# Patient Record
Sex: FEMALE | Race: OTHER | Employment: OTHER | URBAN - METROPOLITAN AREA
[De-identification: names, ages, dates, MRNs, and addresses within clinical notes are randomized per-mention and may not be internally consistent; named-entity substitution may affect disease eponyms.]

---

## 2018-02-21 ENCOUNTER — OFFICE VISIT (OUTPATIENT)
Dept: ENDOCRINOLOGY | Age: 38
End: 2018-02-21
Payer: COMMERCIAL

## 2018-02-21 VITALS
DIASTOLIC BLOOD PRESSURE: 88 MMHG | BODY MASS INDEX: 40.98 KG/M2 | WEIGHT: 255 LBS | HEIGHT: 66 IN | HEART RATE: 80 BPM | SYSTOLIC BLOOD PRESSURE: 138 MMHG

## 2018-02-21 DIAGNOSIS — E66.01 MORBID OBESITY (HCC): ICD-10-CM

## 2018-02-21 DIAGNOSIS — E03.9 HYPOTHYROIDISM, UNSPECIFIED TYPE: ICD-10-CM

## 2018-02-21 DIAGNOSIS — E03.9 HYPOTHYROIDISM, UNSPECIFIED TYPE: Primary | ICD-10-CM

## 2018-02-21 DIAGNOSIS — G47.33 OBSTRUCTIVE SLEEP APNEA SYNDROME: ICD-10-CM

## 2018-02-21 DIAGNOSIS — G47.33 OSA (OBSTRUCTIVE SLEEP APNEA): ICD-10-CM

## 2018-02-21 PROBLEM — F43.10 PTSD (POST-TRAUMATIC STRESS DISORDER): Status: ACTIVE | Noted: 2018-02-21

## 2018-02-21 PROBLEM — E66.9 OBESITY: Status: ACTIVE | Noted: 2018-02-21

## 2018-02-21 LAB
ANION GAP SERPL CALCULATED.3IONS-SCNC: 16 MEQ/L (ref 7–13)
BUN BLDV-MCNC: 16 MG/DL (ref 6–20)
CALCIUM SERPL-MCNC: 8.7 MG/DL (ref 8.6–10.2)
CHLORIDE BLD-SCNC: 102 MEQ/L (ref 98–107)
CO2: 23 MEQ/L (ref 22–29)
CREAT SERPL-MCNC: 0.54 MG/DL (ref 0.5–0.9)
GFR AFRICAN AMERICAN: >60
GFR NON-AFRICAN AMERICAN: >60
GLUCOSE BLD-MCNC: 74 MG/DL (ref 74–109)
POTASSIUM SERPL-SCNC: 4.1 MEQ/L (ref 3.5–5.1)
SODIUM BLD-SCNC: 141 MEQ/L (ref 132–144)
T4 FREE: 1.2 NG/DL (ref 0.93–1.7)
TSH SERPL DL<=0.05 MIU/L-ACNC: 1.89 UIU/ML (ref 0.27–4.2)

## 2018-02-21 PROCEDURE — 99203 OFFICE O/P NEW LOW 30 MIN: CPT | Performed by: INTERNAL MEDICINE

## 2018-02-21 RX ORDER — LEVOTHYROXINE SODIUM 0.12 MG/1
125 TABLET ORAL DAILY
COMMUNITY
End: 2022-08-19 | Stop reason: ALTCHOICE

## 2018-02-21 RX ORDER — LORATADINE 10 MG/1
10 CAPSULE, LIQUID FILLED ORAL DAILY
COMMUNITY
End: 2022-08-19 | Stop reason: ALTCHOICE

## 2018-02-21 RX ORDER — TRIAMCINOLONE ACETONIDE 1 MG/G
CREAM TOPICAL 2 TIMES DAILY
COMMUNITY
End: 2022-08-19 | Stop reason: ALTCHOICE

## 2018-02-21 RX ORDER — POLYETHYLENE GLYCOL 3350 17 G/17G
17 POWDER, FOR SOLUTION ORAL DAILY
COMMUNITY
End: 2022-08-19 | Stop reason: ALTCHOICE

## 2018-02-21 RX ORDER — OXYBUTYNIN CHLORIDE 5 MG/1
5 TABLET ORAL DAILY
COMMUNITY
End: 2022-08-19 | Stop reason: ALTCHOICE

## 2018-02-21 RX ORDER — OMEPRAZOLE 20 MG/1
20 CAPSULE, DELAYED RELEASE ORAL DAILY
COMMUNITY
End: 2022-08-19 | Stop reason: ALTCHOICE

## 2018-02-21 RX ORDER — GABAPENTIN 100 MG/1
100 CAPSULE ORAL 3 TIMES DAILY
COMMUNITY
End: 2022-08-19 | Stop reason: ALTCHOICE

## 2018-02-21 RX ORDER — FLUTICASONE PROPIONATE 50 MCG
2 SPRAY, SUSPENSION (ML) NASAL DAILY
COMMUNITY
End: 2022-08-19 | Stop reason: ALTCHOICE

## 2018-02-21 RX ORDER — OCTISALATE, AVOBENZONE, HOMOSALATE, AND OCTOCRYLENE 29.4; 29.4; 49; 25.48 MG/ML; MG/ML; MG/ML; MG/ML
1 LOTION TOPICAL DAILY
COMMUNITY
End: 2022-08-19 | Stop reason: ALTCHOICE

## 2018-02-21 RX ORDER — TIZANIDINE 4 MG/1
4 TABLET ORAL NIGHTLY PRN
COMMUNITY
End: 2022-08-19 | Stop reason: ALTCHOICE

## 2018-02-21 RX ORDER — DEXTRAN 70, GLYCERIN, HYPROMELLOSE 1; 2; 3 MG/ML; MG/ML; MG/ML
1 SOLUTION/ DROPS OPHTHALMIC DAILY
COMMUNITY
End: 2022-08-19 | Stop reason: ALTCHOICE

## 2018-02-21 NOTE — PROGRESS NOTES
Subjective:      Patient ID: Jadon Torres is a 42 y.o. female. Other   This is a new (Hypothyroidism) problem. The current episode started more than 1 year ago. The problem has been waxing and waning. Associated symptoms include fatigue. Pertinent negatives include no myalgias or neck pain. Associated symptoms comments: Patient has been on different dosages of Synthroid over the last many years ranging between 125-150 current issues on 125 µg daily last TSH was 2.180  Patient also complains of fatigue weight gain BMI is at 41  . Treatments tried: Synthroid. Morbid obesity weight gain BMI is at 41    Patient also complains of snoring at night being sleepy in the daytime and having moderate weight gain over the last couple of years      Patient Active Problem List   Diagnosis    PTSD (post-traumatic stress disorder)    NANDO (obstructive sleep apnea)    Morbid obesity ([facility] [address]    Hypothyroidism       Social History     Social History    Marital status: Single     Spouse name: N/A    Number of children: N/A    Years of education: N/A     Occupational History    Not on file. Social History Main Topics    Smoking status: Never Smoker    Smokeless tobacco: Never Used    Alcohol use Not on file    Drug use: Unknown    Sexual activity: Not on file     Other Topics Concern    Not on file     Social History Narrative    No narrative on file     Past Surgical History:   Procedure Laterality Date    HYSTERECTOMY      JOINT REPLACEMENT Left      History reviewed. No pertinent family history.     Allergies   Allergen Reactions    Red Dye Hives         Current Outpatient Prescriptions:     albuterol (PROVENTIL) (5 MG/ML) 0.5% nebulizer solution, Take 2.5 mg by nebulization every 6 hours as needed for Wheezing, Disp: , Rfl:     loratadine (CLARITIN) 10 MG capsule, Take 10 mg by mouth daily, Disp: , Rfl:     polyethylene glycol (MIRALAX) packet, Take 17 g by mouth daily, Disp: , Rfl:     omeprazole Results for Monica Zamarripa (MRN [id number]) as of 2/26/2018 07:00   Ref. Range 2/21/2018 12:37   Sodium Latest Ref Range: 132 - 144 mEq/L 141   Potassium Latest Ref Range: 3.5 - 5.1 mEq/L 4.1   Chloride Latest Ref Range: 98 - 107 mEq/L 102   CO2 Latest Ref Range: 22 - 29 mEq/L 23   BUN Latest Ref Range: 6 - 20 mg/dL 16   Creatinine Latest Ref Range: 0.50 - 0.90 mg/dL 0.54   Anion Gap Latest Ref Range: 7 - 13 mEq/L 16 (H)   GFR Non- Latest Ref Range: >60  >60.0   GFR African American Latest Ref Range: >60  >60.0   Glucose Latest Ref Range: 74 - 109 mg/dL 74   Calcium Latest Ref Range: 8.6 - 10.2 mg/dL 8.7   TSH Latest Ref Range: 0.270 - 4.200 uIU/mL 1.890   THYROID PEROXIDASE (TPO) ABS Latest Ref Range: 0.0 - 9.0 IU/mL 1.7   T4 Free Latest Ref Range: 0.93 - 1.70 ng/dL 1.20       1/15/18  Labs reviewed from lab [facility]. glucose 100 BUN was 15 creatinine 0.79 TSH was 2.180 AST was 38 ALT was 68   Assessment:      1. Hypothyroidism, unspecified type  T4, Free    TSH without Reflex    Thyroid Peroxidase Antibody    Basic Metabolic Panel   2. Obstructive sleep apnea syndrome  Ambulatory referral to Pulmonology   3.  Morbid obesity ([address]             Plan:      Orders Placed This Encounter   Procedures    T4, Free     Standing Status:   Future     Number of Occurrences:   1     Standing Expiration Date:   2/21/2019    TSH without Reflex     Standing Status:   Future     Number of Occurrences:   1     Standing Expiration Date:   2/21/2019    Thyroid Peroxidase Antibody     Standing Status:   Future     Number of Occurrences:   1     Standing Expiration Date:   2/21/2019    Basic Metabolic Panel     Standing Status:   Future     Number of Occurrences:   1     Standing Expiration Date:   2/21/2019    Ambulatory referral to Pulmonology     Referral Priority:   Routine     Referral Type:   Consult for Advice and Opinion     Referral Reason:   Specialty Services Required     Referred to Provider:   Anselmo Serrano Beronica Shahid MD     Requested Specialty:   Pulmonology     Number of Visits Requested:   1     Continue current dose of Synthroid 125 µg daily daily labs reviewed  Refer patient to pulmonary for sleep apnea screening  More than 50% of 30 minutes spent in patient education counseling review of lab data coordination of care

## 2018-02-23 LAB — THYROID PEROXIDASE (TPO) ABS: 1.7 IU/ML (ref 0–9)

## 2018-03-16 ENCOUNTER — HOSPITAL ENCOUNTER (OUTPATIENT)
Dept: WOMENS IMAGING | Age: 38
Discharge: HOME | End: 2018-03-18
Payer: COMMERCIAL

## 2018-03-16 ENCOUNTER — HOSPITAL ENCOUNTER (OUTPATIENT)
Dept: ULTRASOUND IMAGING | Age: 38
Discharge: HOME | End: 2018-03-18
Payer: COMMERCIAL

## 2018-03-16 DIAGNOSIS — N64.4 PAIN OF BOTH BREASTS: ICD-10-CM

## 2018-03-16 DIAGNOSIS — N64.4 BREAST PAIN: ICD-10-CM

## 2018-03-16 DIAGNOSIS — N64.52 BILATERAL NIPPLE DISCHARGE: ICD-10-CM

## 2018-03-16 PROCEDURE — 76642 ULTRASOUND BREAST LIMITED: CPT

## 2018-03-16 PROCEDURE — 77066 DX MAMMO INCL CAD BI: CPT

## 2018-03-23 ENCOUNTER — HOSPITAL ENCOUNTER (OUTPATIENT)
Dept: ULTRASOUND IMAGING | Age: 38
Discharge: HOME | End: 2018-03-25
Payer: COMMERCIAL

## 2018-03-23 DIAGNOSIS — R10.2 PELVIC PAIN IN FEMALE: ICD-10-CM

## 2018-03-23 PROCEDURE — 76856 US EXAM PELVIC COMPLETE: CPT

## 2018-03-23 PROCEDURE — 76830 TRANSVAGINAL US NON-OB: CPT

## 2018-04-11 ENCOUNTER — OFFICE VISIT (OUTPATIENT)
Dept: ENDOCRINOLOGY | Age: 38
End: 2018-04-11
Payer: COMMERCIAL

## 2018-04-11 VITALS
HEART RATE: 68 BPM | DIASTOLIC BLOOD PRESSURE: 86 MMHG | SYSTOLIC BLOOD PRESSURE: 136 MMHG | WEIGHT: 255 LBS | HEIGHT: 66 IN | BODY MASS INDEX: 40.98 KG/M2

## 2018-04-11 DIAGNOSIS — E66.01 MORBID OBESITY (HCC): ICD-10-CM

## 2018-04-11 DIAGNOSIS — E03.9 HYPOTHYROIDISM, UNSPECIFIED TYPE: Primary | ICD-10-CM

## 2018-04-11 PROCEDURE — 99213 OFFICE O/P EST LOW 20 MIN: CPT | Performed by: INTERNAL MEDICINE

## 2018-04-11 PROCEDURE — 1036F TOBACCO NON-USER: CPT | Performed by: INTERNAL MEDICINE

## 2018-04-11 PROCEDURE — G8417 CALC BMI ABV UP PARAM F/U: HCPCS | Performed by: INTERNAL MEDICINE

## 2018-04-11 PROCEDURE — G8427 DOCREV CUR MEDS BY ELIG CLIN: HCPCS | Performed by: INTERNAL MEDICINE

## 2018-10-16 ENCOUNTER — HOSPITAL ENCOUNTER (OUTPATIENT)
Dept: ULTRASOUND IMAGING | Age: 38
End: 2018-10-16
Payer: COMMERCIAL

## 2018-10-16 ENCOUNTER — HOSPITAL ENCOUNTER (OUTPATIENT)
Dept: WOMENS IMAGING | Age: 38
End: 2018-10-16
Payer: COMMERCIAL

## 2018-10-16 ENCOUNTER — HOSPITAL ENCOUNTER (OUTPATIENT)
Dept: ULTRASOUND IMAGING | Age: 38
Discharge: HOME | End: 2018-10-18
Payer: COMMERCIAL

## 2018-10-16 DIAGNOSIS — D27.1 DERMOID CYST OF OVARY, LEFT: ICD-10-CM

## 2018-10-16 PROCEDURE — 76856 US EXAM PELVIC COMPLETE: CPT

## 2018-10-16 PROCEDURE — 76830 TRANSVAGINAL US NON-OB: CPT

## 2018-10-19 ENCOUNTER — HOSPITAL ENCOUNTER (OUTPATIENT)
Dept: WOMENS IMAGING | Age: 38
Discharge: HOME | End: 2018-10-21
Payer: COMMERCIAL

## 2018-10-19 ENCOUNTER — HOSPITAL ENCOUNTER (OUTPATIENT)
Dept: ULTRASOUND IMAGING | Age: 38
Discharge: HOME | End: 2018-10-21
Payer: COMMERCIAL

## 2018-10-19 DIAGNOSIS — R92.8 ABNORMAL MAMMOGRAM: ICD-10-CM

## 2018-10-19 PROCEDURE — G0279 TOMOSYNTHESIS, MAMMO: HCPCS

## 2018-10-19 PROCEDURE — 76642 ULTRASOUND BREAST LIMITED: CPT

## 2019-12-18 ENCOUNTER — HOSPITAL ENCOUNTER (OUTPATIENT)
Dept: WOMENS IMAGING | Age: 39
Discharge: HOME | End: 2019-12-20
Payer: COMMERCIAL

## 2019-12-18 ENCOUNTER — HOSPITAL ENCOUNTER (OUTPATIENT)
Dept: GENERAL RADIOLOGY | Age: 39
Discharge: HOME | End: 2019-12-20
Payer: COMMERCIAL

## 2019-12-18 ENCOUNTER — HOSPITAL ENCOUNTER (OUTPATIENT)
Dept: ULTRASOUND IMAGING | Age: 39
Discharge: HOME | End: 2019-12-20
Payer: COMMERCIAL

## 2019-12-18 DIAGNOSIS — M25.512 LEFT SHOULDER PAIN, UNSPECIFIED CHRONICITY: ICD-10-CM

## 2019-12-18 DIAGNOSIS — N63.0 LUMP OR MASS IN BREAST: ICD-10-CM

## 2019-12-18 DIAGNOSIS — N63.0 LUMP IN FEMALE BREAST: ICD-10-CM

## 2019-12-18 PROCEDURE — 76642 ULTRASOUND BREAST LIMITED: CPT

## 2019-12-18 PROCEDURE — G0279 TOMOSYNTHESIS, MAMMO: HCPCS

## 2019-12-18 PROCEDURE — 73030 X-RAY EXAM OF SHOULDER: CPT

## 2019-12-31 ENCOUNTER — HOSPITAL ENCOUNTER (OUTPATIENT)
Dept: ULTRASOUND IMAGING | Age: 39
Discharge: HOME | End: 2020-01-02
Payer: COMMERCIAL

## 2019-12-31 PROCEDURE — 76705 ECHO EXAM OF ABDOMEN: CPT

## 2020-01-15 ENCOUNTER — TELEPHONE (OUTPATIENT)
Dept: GASTROENTEROLOGY | Age: 40
End: 2020-01-15

## 2020-02-19 ENCOUNTER — HOSPITAL ENCOUNTER (EMERGENCY)
Age: 40
Discharge: HOME | End: 2020-02-19
Attending: STUDENT IN AN ORGANIZED HEALTH CARE EDUCATION/TRAINING PROGRAM
Payer: COMMERCIAL

## 2020-02-19 VITALS
TEMPERATURE: 98.2 F | SYSTOLIC BLOOD PRESSURE: 146 MMHG | HEIGHT: 67 IN | WEIGHT: 260 LBS | DIASTOLIC BLOOD PRESSURE: 97 MMHG | BODY MASS INDEX: 40.81 KG/M2 | OXYGEN SATURATION: 98 % | RESPIRATION RATE: 18 BRPM | HEART RATE: 68 BPM

## 2020-02-19 PROCEDURE — 6360000002 HC RX W HCPCS: Performed by: STUDENT IN AN ORGANIZED HEALTH CARE EDUCATION/TRAINING PROGRAM

## 2020-02-19 PROCEDURE — 96372 THER/PROPH/DIAG INJ SC/IM: CPT

## 2020-02-19 PROCEDURE — 99283 EMERGENCY DEPT VISIT LOW MDM: CPT

## 2020-02-19 PROCEDURE — 2500000003 HC RX 250 WO HCPCS: Performed by: STUDENT IN AN ORGANIZED HEALTH CARE EDUCATION/TRAINING PROGRAM

## 2020-02-19 RX ORDER — ASPIRIN 81 MG
1 TABLET,CHEWABLE ORAL 3 TIMES DAILY PRN
Qty: 56.6 G | Refills: 0 | Status: SHIPPED | OUTPATIENT
Start: 2020-02-19 | End: 2022-08-19 | Stop reason: ALTCHOICE

## 2020-02-19 RX ORDER — LORAZEPAM 0.5 MG/1
1 TABLET ORAL EVERY 8 HOURS PRN
Qty: 9 TABLET | Refills: 0 | Status: SHIPPED | OUTPATIENT
Start: 2020-02-19 | End: 2020-02-22

## 2020-02-19 RX ORDER — KETAMINE HYDROCHLORIDE 10 MG/ML
20 INJECTION, SOLUTION INTRAMUSCULAR; INTRAVENOUS ONCE
Status: COMPLETED | OUTPATIENT
Start: 2020-02-19 | End: 2020-02-19

## 2020-02-19 RX ORDER — LORAZEPAM 2 MG/ML
2 INJECTION INTRAMUSCULAR ONCE
Status: COMPLETED | OUTPATIENT
Start: 2020-02-19 | End: 2020-02-19

## 2020-02-19 RX ADMIN — KETAMINE HYDROCHLORIDE 20 MG: 10 INJECTION, SOLUTION INTRAMUSCULAR; INTRAVENOUS at 10:59

## 2020-02-19 RX ADMIN — LORAZEPAM 2 MG: 2 INJECTION INTRAMUSCULAR; INTRAVENOUS at 10:58

## 2020-02-19 ASSESSMENT — ENCOUNTER SYMPTOMS
SHORTNESS OF BREATH: 0
DIARRHEA: 0
VOMITING: 0
BACK PAIN: 1
CHEST TIGHTNESS: 0
SINUS PRESSURE: 0
TROUBLE SWALLOWING: 0
COUGH: 0
ABDOMINAL PAIN: 0

## 2020-02-19 ASSESSMENT — PAIN SCALES - GENERAL
PAINLEVEL_OUTOF10: 10
PAINLEVEL_OUTOF10: 10

## 2020-02-19 NOTE — ED PROVIDER NOTES
tightness and shortness of breath. Cardiovascular: Negative for chest pain and leg swelling. Gastrointestinal: Negative for abdominal pain, diarrhea and vomiting. Endocrine: Negative for polydipsia and polyphagia. Genitourinary: Negative for dysuria, flank pain and frequency. Musculoskeletal: Positive for back pain and neck pain. Negative for myalgias. Skin: Negative for pallor and rash. Neurological: Negative for syncope, weakness and headaches. Hematological: Does not bruise/bleed easily. All other systems reviewed and are negative. Except as noted above the remainder of the review of systems was reviewed and negative. PAST MEDICAL HISTORY     Past Medical History:   Diagnosis Date    Allergic rhinitis     Depressive disorder     Hypothyroidism     Inverted nipple     Migraine     Obesity     NANDO (obstructive sleep apnea)     Osteoarthritis     PTSD (post-traumatic stress disorder)          SURGICALHISTORY       Past Surgical History:   Procedure Laterality Date    HYSTERECTOMY      JOINT REPLACEMENT Left          CURRENT MEDICATIONS       Previous Medications    ALBUTEROL (PROVENTIL) (5 MG/ML) 0.5% NEBULIZER SOLUTION    Take 2.5 mg by nebulization every 6 hours as needed for Wheezing    ARTIFICIAL TEAR SOLUTION (GENTEAL TEARS) 0.1-0.2-0.3 % SOLN    Apply 1 drop to eye daily    FLUTICASONE (FLONASE) 50 MCG/ACT NASAL SPRAY    2 sprays by Nasal route daily    GABAPENTIN (NEURONTIN) 100 MG CAPSULE    Take 100 mg by mouth 3 times daily.     LEVOTHYROXINE (SYNTHROID) 125 MCG TABLET    Take 125 mcg by mouth Daily    LORATADINE (CLARITIN) 10 MG CAPSULE    Take 10 mg by mouth daily    OMEPRAZOLE (PRILOSEC) 20 MG DELAYED RELEASE CAPSULE    Take 20 mg by mouth daily    OXYBUTYNIN (DITROPAN) 5 MG TABLET    Take 5 mg by mouth daily    POLYETHYLENE GLYCOL (MIRALAX) PACKET    Take 17 g by mouth daily    PROBIOTIC PRODUCT (ALIGN) 4 MG CAPS    Take 1 capsule by mouth daily    TIZANIDINE Left side of head: No submental adenopathy. Skin:     General: Skin is warm and dry. Capillary Refill: Capillary refill takes less than 2 seconds. Coloration: Skin is not jaundiced or pale. Findings: No bruising, erythema, lesion or rash. Neurological:      General: No focal deficit present. Mental Status: She is alert and oriented to person, place, and time. Mental status is at baseline. Cranial Nerves: No cranial nerve deficit. Sensory: No sensory deficit. Motor: No weakness. Coordination: Coordination normal.      Deep Tendon Reflexes: Reflexes are normal and symmetric. Psychiatric:         Mood and Affect: Mood normal.         Behavior: Behavior normal. Behavior is cooperative. Thought Content: Thought content normal.         Judgment: Judgment normal.         DIAGNOSTIC RESULTS     EKG: All EKG's are interpreted by the Emergency Department Physician who either signs or Co-signsthis chart in the absence of a cardiologist.        RADIOLOGY:   Kimberlyn Isaiah such as CT, Ultrasound and MRI are read by the radiologist. Plain radiographic images are visualized and preliminarily interpreted by the emergency physician with the below findings:        Interpretation per the Radiologist below, if available at the time ofthis note:    No orders to display         ED BEDSIDE ULTRASOUND:   Performed by ED Physician - none    LABS:  Labs Reviewed - No data to display    All other labs were within normal range or not returned as of this dictation. EMERGENCY DEPARTMENT COURSE and DIFFERENTIAL DIAGNOSIS/MDM:   Vitals:    Vitals:    02/19/20 1004   BP: 136/76   Pulse: 68   Resp: 18   Temp: 98.2 °F (36.8 °C)   TempSrc: Oral   SpO2: 98%   Weight: 260 lb (117.9 kg)   Height: 5' 7\" (1.702 m)           MDM  Received IM Ativan 2 mg. Patient received IM ketamine 20 mg. On reexamination the patient has less pain with range of motion testing.   Patient states she still has

## 2020-02-19 NOTE — ED NOTES
Discharge instructions reviewed and signed. Prescriptions given to pt. No questions at this time. Pt ambulatory in stable condition with family at discharge.      Kael Keane RN  02/19/20 0316

## 2020-02-19 NOTE — ED NOTES
Pt reports no improvement since being medicated. Dr. Darcie Santos at bedside and aware.      Vickie Mahan, NA  02/19/20 [id number]

## 2020-02-19 NOTE — ED TRIAGE NOTES
Pt is a+o x4 msps intact lungs clear bilat skin warm pink and dry. Pt c/o neck pain that goes down to her lower back. Pt states she woke with this mornday after lifting mom at home that she recent started caring for. Pt denies any numbness or tingling at this time.  Pt denies any problems with bowels or bladder

## 2020-06-12 ENCOUNTER — HOSPITAL ENCOUNTER (OUTPATIENT)
Dept: SLEEP CENTER | Age: 40
Discharge: HOME | End: 2020-06-14
Payer: COMMERCIAL

## 2020-06-12 PROCEDURE — 95810 POLYSOM 6/> YRS 4/> PARAM: CPT

## 2020-06-15 PROCEDURE — 95810 POLYSOM 6/> YRS 4/> PARAM: CPT | Performed by: INTERNAL MEDICINE

## 2020-08-18 ENCOUNTER — HOSPITAL ENCOUNTER (EMERGENCY)
Age: 40
Discharge: HOME | End: 2020-08-18
Payer: COMMERCIAL

## 2020-08-18 ENCOUNTER — APPOINTMENT (OUTPATIENT)
Dept: GENERAL RADIOLOGY | Age: 40
End: 2020-08-18
Payer: COMMERCIAL

## 2020-08-18 VITALS
HEART RATE: 64 BPM | OXYGEN SATURATION: 100 % | WEIGHT: 270 LBS | TEMPERATURE: 97.6 F | BODY MASS INDEX: 42.38 KG/M2 | RESPIRATION RATE: 18 BRPM | HEIGHT: 67 IN | DIASTOLIC BLOOD PRESSURE: 93 MMHG | SYSTOLIC BLOOD PRESSURE: 147 MMHG

## 2020-08-18 PROCEDURE — 99283 EMERGENCY DEPT VISIT LOW MDM: CPT

## 2020-08-18 PROCEDURE — 73630 X-RAY EXAM OF FOOT: CPT

## 2020-08-18 PROCEDURE — 6370000000 HC RX 637 (ALT 250 FOR IP): Performed by: PHYSICIAN ASSISTANT

## 2020-08-18 PROCEDURE — 73610 X-RAY EXAM OF ANKLE: CPT

## 2020-08-18 RX ORDER — ACETAMINOPHEN 325 MG/1
650 TABLET ORAL EVERY 6 HOURS PRN
Qty: 20 TABLET | Refills: 0 | Status: SHIPPED | OUTPATIENT
Start: 2020-08-18 | End: 2022-10-20

## 2020-08-18 RX ORDER — ACETAMINOPHEN 325 MG/1
650 TABLET ORAL ONCE
Status: COMPLETED | OUTPATIENT
Start: 2020-08-18 | End: 2020-08-18

## 2020-08-18 RX ADMIN — ACETAMINOPHEN 650 MG: 325 TABLET ORAL at 13:14

## 2020-08-18 ASSESSMENT — ENCOUNTER SYMPTOMS
EYES NEGATIVE: 1
RESPIRATORY NEGATIVE: 1
GASTROINTESTINAL NEGATIVE: 1

## 2020-08-18 ASSESSMENT — PAIN SCALES - GENERAL: PAINLEVEL_OUTOF10: 9

## 2020-08-18 ASSESSMENT — PAIN DESCRIPTION - ORIENTATION: ORIENTATION: RIGHT

## 2020-08-18 ASSESSMENT — PAIN DESCRIPTION - LOCATION: LOCATION: ANKLE

## 2020-08-18 NOTE — ED TRIAGE NOTES
Pt to ER with c/o right ankle pain after falling and twisting her ankle, pain 9/10, pt a&ox4, resp even and unlabored, pt able to ambulate with a limp, right ankle noted to be swollen

## 2020-08-18 NOTE — ED PROVIDER NOTES
[address] ED  eMERGENCY dEPARTMENT eNCOUnter      Pt Name: Christopher Caruso  MRN: [id number]  Aidangfaleyda 1980  Date of evaluation: 8/18/2020  Provider: Julianna Mckeon PA-C      HISTORY OF PRESENT ILLNESS    Christopher Caruso is a 37 y.o. female who presents to the Emergency Department with chief complaint of right foot and right ankle. Patient states she twisted her right lower extremity about 4 days ago. Patient has been ambulating on the injured extremity, but not without discomfort. She denies any head or neck injury. Patient has not been taking any over-the-counter medication for pain. Patient has no other concerns at this time. REVIEW OF SYSTEMS       Review of Systems   Constitutional: Negative. HENT: Negative. Eyes: Negative. Respiratory: Negative. Cardiovascular: Negative. Gastrointestinal: Negative. Endocrine: Negative. Genitourinary: Negative. Musculoskeletal: Positive for arthralgias. Right foot and right ankle   Skin: Negative. Neurological: Negative. Psychiatric/Behavioral: Negative.           PAST MEDICAL HISTORY     Past Medical History:   Diagnosis Date    Allergic rhinitis     Depressive disorder     Hypothyroidism     Inverted nipple     Migraine     Obesity     NANDO (obstructive sleep apnea)     Osteoarthritis     PTSD (post-traumatic stress disorder)          SURGICAL HISTORY       Past Surgical History:   Procedure Laterality Date    HYSTERECTOMY      JOINT REPLACEMENT Left          CURRENT MEDICATIONS       Discharge Medication List as of 8/18/2020  1:23 PM      CONTINUE these medications which have NOT CHANGED    Details   Capsaicin 0.1 % CREA Apply 1 applicator topically 3 times daily as needed (back or neck pain), Disp-56.6 g, R-0Print      albuterol (PROVENTIL) (5 MG/ML) 0.5% nebulizer solution Take 2.5 mg by nebulization every 6 hours as needed for WheezingHistorical Med      loratadine (CLARITIN) 10 MG capsule Take 10 mg by mouth dailyHistorical Med      polyethylene glycol (MIRALAX) packet Take 17 g by mouth dailyHistorical Med      omeprazole (PRILOSEC) 20 MG delayed release capsule Take 20 mg by mouth dailyHistorical Med      Probiotic Product (ALIGN) 4 MG CAPS Take 1 capsule by mouth dailyHistorical Med      tiZANidine (ZANAFLEX) 4 MG tablet Take 4 mg by mouth nightly as neededHistorical Med      oxybutynin (DITROPAN) 5 MG tablet Take 5 mg by mouth dailyHistorical Med      fluticasone (FLONASE) 50 MCG/ACT nasal spray 2 sprays by Nasal route dailyHistorical Med      triamcinolone (KENALOG) 0.1 % cream Apply topically 2 times daily Apply topically 2 times daily. , Topical, 2 TIMES DAILY, Historical Med      gabapentin (NEURONTIN) 100 MG capsule Take 100 mg by mouth 3 times daily. Historical Med      Artificial Tear Solution (GENTEAL TEARS) 0.1-0.2-0.3 % SOLN Apply 1 drop to eye dailyHistorical Med      levothyroxine (SYNTHROID) 125 MCG tablet Take 125 mcg by mouth DailyHistorical Med             ALLERGIES     Dye [iodides]; Naproxen; and Red dye    FAMILY HISTORY     History reviewed. No pertinent family history.        SOCIAL HISTORY       Social History     Socioeconomic History    Marital status: Single     Spouse name: None    Number of children: None    Years of education: None    Highest education level: None   Occupational History    None   Social Needs    Financial resource strain: None    Food insecurity     Worry: None     Inability: None    Transportation needs     Medical: None     Non-medical: None   Tobacco Use    Smoking status: Never Smoker    Smokeless tobacco: Never Used   Substance and Sexual Activity    Alcohol use: None     Comment: social    Drug use: Never    Sexual activity: None   Lifestyle    Physical activity     Days per week: None     Minutes per session: None    Stress: None   Relationships    Social connections     Talks on phone: None     Gets together: None     Attends Church service: None     Active member of club or organization: None     Attends meetings of clubs or organizations: None     Relationship status: None    Intimate partner violence     Fear of current or ex partner: None     Emotionally abused: None     Physically abused: None     Forced sexual activity: None   Other Topics Concern    None   Social History Narrative    None       SCREENINGS      @FLOW([id number])@      PHYSICAL EXAM    (up to 7 for level 4, 8 or more for level 5)     ED Triage Vitals [08/18/20 1211]   BP Temp Temp Source Pulse Resp SpO2 Height Weight   (!) 147/93 97.6 °F (36.4 °C) Temporal 64 18 100 % 5' 7\" (1.702 m) 270 lb (122.5 kg)       Physical Exam  Constitutional:       General: She is not in acute distress. Appearance: She is well-developed. HENT:      Head: Normocephalic and atraumatic. Eyes:      Conjunctiva/sclera: Conjunctivae normal.      Pupils: Pupils are equal, round, and reactive to light. Neck:      Musculoskeletal: Normal range of motion and neck supple. Cardiovascular:      Rate and Rhythm: Normal rate and regular rhythm. Heart sounds: No murmur. Pulmonary:      Effort: No respiratory distress. Breath sounds: Normal breath sounds. No wheezing or rales. Abdominal:      General: There is no distension. Palpations: Abdomen is soft. Tenderness: There is no abdominal tenderness. Musculoskeletal: Normal range of motion. Right ankle: She exhibits swelling. Tenderness. Lateral malleolus tenderness found. Right foot: Tenderness, bony tenderness and swelling present. Skin:     General: Skin is warm and dry. Findings: No erythema or rash. Neurological:      Mental Status: She is alert and oriented to person, place, and time. Cranial Nerves: No cranial nerve deficit. Psychiatric:         Judgment: Judgment normal.           All other labs were within normal range or not returned as of this dictation.     EMERGENCY DEPARTMENT COURSE and DIFFERENTIALDIAGNOSIS/MDM:   Vitals:    Vitals:    08/18/20 1211   BP: (!) 147/93   Pulse: 64   Resp: 18   Temp: 97.6 °F (36.4 °C)   TempSrc: Temporal   SpO2: 100%   Weight: 270 lb (122.5 kg)   Height: 5' 7\" (1.702 m)        X-ray of right foot and right ankle are negative for acute fracture. Patient placed in Aircast for stabilization and comfort. Capillary refill less than 3 seconds. Follow-up with orthopedics for re-evaluation and treatment. Return here if symptoms worsen or if new concerning symptoms arise. Patient verbalizes understanding plan discharge is no further questions. PROCEDURES:  Unless otherwise noted below, none     Procedures      FINAL IMPRESSION      1. Sprain of right ankle, unspecified ligament, initial encounter    2.  Right foot sprain, initial encounter          DISPOSITION/PLAN   DISPOSITION Decision To Discharge 08/18/2020 01:21:13 PM          Martina Valadez PA-C (electronically signed)  Attending Emergency Physician  [address], PA-C  08/18/20 0298

## 2020-09-18 ENCOUNTER — HOSPITAL ENCOUNTER (OUTPATIENT)
Dept: SLEEP CENTER | Age: 40
Discharge: HOME | End: 2020-09-20
Payer: COMMERCIAL

## 2020-09-18 PROCEDURE — 95811 POLYSOM 6/>YRS CPAP 4/> PARM: CPT

## 2020-09-20 PROCEDURE — 95811 POLYSOM 6/>YRS CPAP 4/> PARM: CPT | Performed by: INTERNAL MEDICINE

## 2020-10-23 ENCOUNTER — OFFICE VISIT (OUTPATIENT)
Dept: PULMONOLOGY | Age: 40
End: 2020-10-23
Payer: COMMERCIAL

## 2020-10-23 VITALS
DIASTOLIC BLOOD PRESSURE: 72 MMHG | BODY MASS INDEX: 42.85 KG/M2 | RESPIRATION RATE: 16 BRPM | TEMPERATURE: 97.8 F | SYSTOLIC BLOOD PRESSURE: 122 MMHG | HEIGHT: 67 IN | OXYGEN SATURATION: 99 % | WEIGHT: 273 LBS | HEART RATE: 76 BPM

## 2020-10-23 PROCEDURE — G8484 FLU IMMUNIZE NO ADMIN: HCPCS | Performed by: INTERNAL MEDICINE

## 2020-10-23 PROCEDURE — G8417 CALC BMI ABV UP PARAM F/U: HCPCS | Performed by: INTERNAL MEDICINE

## 2020-10-23 PROCEDURE — 1036F TOBACCO NON-USER: CPT | Performed by: INTERNAL MEDICINE

## 2020-10-23 PROCEDURE — 99204 OFFICE O/P NEW MOD 45 MIN: CPT | Performed by: INTERNAL MEDICINE

## 2020-10-23 PROCEDURE — G8427 DOCREV CUR MEDS BY ELIG CLIN: HCPCS | Performed by: INTERNAL MEDICINE

## 2020-10-23 ASSESSMENT — ENCOUNTER SYMPTOMS
VOMITING: 0
SINUS PRESSURE: 0
EYE DISCHARGE: 0
RHINORRHEA: 0
VOICE CHANGE: 0
CHEST TIGHTNESS: 0
TROUBLE SWALLOWING: 0
WHEEZING: 0
DIARRHEA: 0
SHORTNESS OF BREATH: 0
APNEA: 1
EYE ITCHING: 0
NAUSEA: 0
ABDOMINAL PAIN: 0
COUGH: 0
SORE THROAT: 0

## 2020-10-23 NOTE — PROGRESS NOTES
Subjective:     Jesse Reed is a 37 y.o. female who complains today of:     Chief Complaint   Patient presents with    Established New Doctor     referred by Irineo Ramos for Sleep Study results. Pt has Hx of NANDO and has had CPAP machine in the past.       HPI  She does not speak english came with boyfriend for interpretation. Patient has PSG done on 6/12/20  an shows NANDO. AHI 14.4   RDI 14.4   CPAP titration study done on shows therapeutic CPAP at 10     She is complaining of snoring and daytime sleepiness and tiredness. C/o witness apnea as per boyfriend  She wakes up with gasping for air. C/o wakes up frequently during sleep . complaint of morning headache. She  does not have restful sleep. She  does not take daily naps. She does  fall asleep while watching TV. She  does not have a complaint of sleepiness while driving. She does not have difficulty falling sleep or staying asleep  20 lbs Weight gain in last 6-12 month   No sleep walking,  talking or eating   No sleep onset hallucination. No sleep paralysis . No sleep attacks. She is going for bariatric surgery at [address]     Allergies:  Dye [iodides]; Naproxen; and Red dye  Past Medical History:   Diagnosis Date    Allergic rhinitis     Depressive disorder     Hypothyroidism     Inverted nipple     Migraine     Obesity     NANDO (obstructive sleep apnea)     Osteoarthritis     PTSD (post-traumatic stress disorder)      Past Surgical History:   Procedure Laterality Date    HYSTERECTOMY      JOINT REPLACEMENT Left      No family history on file.   Social History     Socioeconomic History    Marital status: Single     Spouse name: Not on file    Number of children: Not on file    Years of education: Not on file    Highest education level: Not on file   Occupational History    Not on file   Social Needs    Financial resource strain: Not on file    Food insecurity     Worry: Not on file     Inability: Not on file   [employer] needs     Medical: Not on file     Non-medical: Not on file   Tobacco Use    Smoking status: Never Smoker    Smokeless tobacco: Never Used   Substance and Sexual Activity    Alcohol use: Not on file     Comment: social    Drug use: Never    Sexual activity: Not on file   Lifestyle    Physical activity     Days per week: Not on file     Minutes per session: Not on file    Stress: Not on file   Relationships    Social connections     Talks on phone: Not on file     Gets together: Not on file     Attends [employer]ism service: Not on file     Active member of club or organization: Not on file     Attends meetings of clubs or organizations: Not on file     Relationship status: Not on file    Intimate partner violence     Fear of current or ex partner: Not on file     Emotionally abused: Not on file     Physically abused: Not on file     Forced sexual activity: Not on file   Other Topics Concern    Not on file   Social History Narrative    Not on file         Review of Systems   Constitutional: Negative for chills, diaphoresis, fatigue and fever. HENT: Negative for congestion, mouth sores, nosebleeds, postnasal drip, rhinorrhea, sinus pressure, sneezing, sore throat, trouble swallowing and voice change. Snoring    Eyes: Negative for discharge, itching and visual disturbance. Respiratory: Positive for apnea. Negative for cough, chest tightness, shortness of breath and wheezing. Cardiovascular: Negative for chest pain, palpitations and leg swelling. Gastrointestinal: Negative for abdominal pain, diarrhea, nausea and vomiting. Genitourinary: Negative for difficulty urinating and hematuria. Musculoskeletal: Negative for arthralgias, joint swelling and myalgias. Skin: Negative for rash. Allergic/Immunologic: Negative for environmental allergies and food allergies. Neurological: Negative for dizziness, tremors, weakness and headaches.    Psychiatric/Behavioral: Positive for sleep disturbance. Negative for behavioral problems. :     Vitals:    10/23/20 1021   BP: 122/72   Pulse: 76   Resp: 16   Temp: 97.8 °F (36.6 °C)   TempSrc: Temporal   SpO2: 99%   Weight: 273 lb (123.8 kg)   Height: 5' 7\" (1.702 m)     Wt Readings from Last 3 Encounters:   10/23/20 273 lb (123.8 kg)   08/18/20 270 lb (122.5 kg)   02/19/20 260 lb (117.9 kg)         Physical Exam  Constitutional:       General: She is not in acute distress. Appearance: She is well-developed. She is obese. She is not diaphoretic. HENT:      Head: Normocephalic and atraumatic. Nose: Nose normal.      Mouth/Throat:      Comments: Narrow airway  Eyes:      Pupils: Pupils are equal, round, and reactive to light. Neck:      Thyroid: No thyromegaly. Vascular: No JVD. Trachea: No tracheal deviation. Cardiovascular:      Rate and Rhythm: Normal rate and regular rhythm. Heart sounds: No murmur. No friction rub. No gallop. Pulmonary:      Effort: No respiratory distress. Breath sounds: No wheezing or rales. Chest:      Chest wall: No tenderness. Abdominal:      General: There is no distension. Tenderness: There is no abdominal tenderness. There is no rebound. Musculoskeletal: Normal range of motion. Lymphadenopathy:      Cervical: No cervical adenopathy. Skin:     General: Skin is warm and dry. Neurological:      Mental Status: She is alert and oriented to person, place, and time.       Coordination: Coordination normal.         Current Outpatient Medications   Medication Sig Dispense Refill    CPAP Machine MISC by Does not apply route New CPAP at 10 cm 1 each 0    levothyroxine (SYNTHROID) 125 MCG tablet Take 125 mcg by mouth Daily      acetaminophen (AMINOFEN) 325 MG tablet Take 2 tablets by mouth every 6 hours as needed for Pain (Patient not taking: Reported on 10/23/2020) 20 tablet 0    Capsaicin 0.1 % CREA Apply 1 applicator topically 3 times daily as needed sides. Avoid  sleep deprivation. Explained sleep hygiene. Advice to avoid Alcohol and sedative      2. Morbid obesity ([address]  Patient patient is advised try to lose weight. obesity related risk explained to the patient ,  Current weight:  273 lb (123.8 kg) Lbs. BMI:  Body mass index is 42.76 kg/m². Suggested weight control approaches, including dietary changes , exercise, behavioral modification. She is going for bariatric surgery      Return in about 4 weeks (around 11/20/2020) for lamberto.       Alexandru Avendaño MD

## 2020-11-24 ENCOUNTER — OFFICE VISIT (OUTPATIENT)
Dept: PULMONOLOGY | Age: 40
End: 2020-11-24
Payer: COMMERCIAL

## 2020-11-24 VITALS
BODY MASS INDEX: 43.63 KG/M2 | SYSTOLIC BLOOD PRESSURE: 122 MMHG | WEIGHT: 278 LBS | RESPIRATION RATE: 16 BRPM | HEART RATE: 70 BPM | HEIGHT: 67 IN | OXYGEN SATURATION: 100 % | DIASTOLIC BLOOD PRESSURE: 60 MMHG | TEMPERATURE: 97.7 F

## 2020-11-24 PROCEDURE — G8427 DOCREV CUR MEDS BY ELIG CLIN: HCPCS | Performed by: INTERNAL MEDICINE

## 2020-11-24 PROCEDURE — 99214 OFFICE O/P EST MOD 30 MIN: CPT | Performed by: INTERNAL MEDICINE

## 2020-11-24 PROCEDURE — 1036F TOBACCO NON-USER: CPT | Performed by: INTERNAL MEDICINE

## 2020-11-24 PROCEDURE — G8484 FLU IMMUNIZE NO ADMIN: HCPCS | Performed by: INTERNAL MEDICINE

## 2020-11-24 PROCEDURE — G8417 CALC BMI ABV UP PARAM F/U: HCPCS | Performed by: INTERNAL MEDICINE

## 2020-11-24 ASSESSMENT — ENCOUNTER SYMPTOMS
TROUBLE SWALLOWING: 0
NAUSEA: 0
VOMITING: 0
COUGH: 0
VOICE CHANGE: 0
RHINORRHEA: 0
EYE DISCHARGE: 0
SINUS PRESSURE: 0
SORE THROAT: 0
DIARRHEA: 0
CHEST TIGHTNESS: 0
EYE ITCHING: 0
WHEEZING: 0
SHORTNESS OF BREATH: 0
ABDOMINAL PAIN: 0

## 2020-11-24 NOTE — PROGRESS NOTES
Subjective:     Austen Esocbedo is a 37 y.o. female who complains today of:     Chief Complaint   Patient presents with    Follow-up     four week f/u for NANDO on CPAP. Pt needs letter for surgery clearance. HPI  She came with friend delmi  for interpenetratin  She is using CPAP with    10  centimeters of H2O with heated humidity. She is using CPAP for about   6-7  hours every night. She is using CPAP with  Nasal  Mask. She said  sleep is restful with the CPAP use. She is compliant with CPAP therapy and benefiting with CPAP use. No snoring with CPAP use. No complaint of daytime sleepiness or tiredness with CPAP use. She denies taking naps. She denies difficulty falling asleep or staying asleep. She is going for bariatric surgery at [address]    Allergies:  Dye [iodides]; Naproxen; and Red dye  Past Medical History:   Diagnosis Date    Allergic rhinitis     Depressive disorder     Hypothyroidism     Inverted nipple     Migraine     Obesity     NANDO (obstructive sleep apnea)     Osteoarthritis     PTSD (post-traumatic stress disorder)      Past Surgical History:   Procedure Laterality Date    HYSTERECTOMY      JOINT REPLACEMENT Left      No family history on file.   Social History     Socioeconomic History    Marital status: Single     Spouse name: Not on file    Number of children: Not on file    Years of education: Not on file    Highest education level: Not on file   Occupational History    Not on file   Social Needs    Financial resource strain: Not on file    Food insecurity     Worry: Not on file     Inability: Not on file    Transportation needs     Medical: Not on file     Non-medical: Not on file   Tobacco Use    Smoking status: Never Smoker    Smokeless tobacco: Never Used   Substance and Sexual Activity    Alcohol use: Not on file     Comment: social    Drug use: Never    Sexual activity: Not on file   Lifestyle    Physical activity     Days per week: Not on file     Minutes per session: Not on file    Stress: Not on file   Relationships    Social connections     Talks on phone: Not on file     Gets together: Not on file     Attends [employer] service: Not on file     Active member of club or organization: Not on file     Attends meetings of clubs or organizations: Not on file     Relationship status: Not on file    Intimate partner violence     Fear of current or ex partner: Not on file     Emotionally abused: Not on file     Physically abused: Not on file     Forced sexual activity: Not on file   Other Topics Concern    Not on file   Social History Narrative    Not on file         Review of Systems   Constitutional: Negative for chills, diaphoresis, fatigue and fever. HENT: Negative for congestion, mouth sores, nosebleeds, postnasal drip, rhinorrhea, sinus pressure, sneezing, sore throat, trouble swallowing and voice change. Eyes: Negative for discharge, itching and visual disturbance. Respiratory: Negative for cough, chest tightness, shortness of breath and wheezing. Cardiovascular: Negative for chest pain, palpitations and leg swelling. Gastrointestinal: Negative for abdominal pain, diarrhea, nausea and vomiting. Genitourinary: Negative for difficulty urinating and hematuria. Musculoskeletal: Negative for arthralgias, joint swelling and myalgias. Skin: Negative for rash. Allergic/Immunologic: Negative for environmental allergies and food allergies. Neurological: Negative for dizziness, tremors, weakness and headaches. Psychiatric/Behavioral: Positive for sleep disturbance. Negative for behavioral problems.          :     Vitals:    11/24/20 1050   BP: 122/60   Pulse: 70   Resp: 16   Temp: 97.7 °F (36.5 °C)   TempSrc: Temporal   SpO2: 100%   Weight: 278 lb (126.1 kg)   Height: 5' 7\" (1.702 m)     Wt Readings from Last 3 Encounters:   11/24/20 278 lb (126.1 kg)   10/23/20 273 lb (123.8 kg)   08/18/20 270 lb (122.5 kg)         Physical Exam  Constitutional:       General: She is not in acute distress. Appearance: She is well-developed. She is obese. She is not diaphoretic. HENT:      Head: Normocephalic and atraumatic. Nose: Nose normal.   Eyes:      Pupils: Pupils are equal, round, and reactive to light. Neck:      Thyroid: No thyromegaly. Vascular: No JVD. Trachea: No tracheal deviation. Cardiovascular:      Rate and Rhythm: Normal rate and regular rhythm. Heart sounds: No murmur. No friction rub. No gallop. Pulmonary:      Effort: No respiratory distress. Breath sounds: No wheezing or rales. Chest:      Chest wall: No tenderness. Abdominal:      General: There is no distension. Tenderness: There is no abdominal tenderness. There is no rebound. Musculoskeletal: Normal range of motion. Lymphadenopathy:      Cervical: No cervical adenopathy. Skin:     General: Skin is warm and dry. Neurological:      Mental Status: She is alert and oriented to person, place, and time.       Coordination: Coordination normal.         Current Outpatient Medications   Medication Sig Dispense Refill    CPAP Machine MISC by Does not apply route New CPAP at 10 cm 1 each 0    acetaminophen (AMINOFEN) 325 MG tablet Take 2 tablets by mouth every 6 hours as needed for Pain 20 tablet 0    Capsaicin 0.1 % CREA Apply 1 applicator topically 3 times daily as needed (back or neck pain) 56.6 g 0    albuterol (PROVENTIL) (5 MG/ML) 0.5% nebulizer solution Take 2.5 mg by nebulization every 6 hours as needed for Wheezing      loratadine (CLARITIN) 10 MG capsule Take 10 mg by mouth daily      polyethylene glycol (MIRALAX) packet Take 17 g by mouth daily      omeprazole (PRILOSEC) 20 MG delayed release capsule Take 20 mg by mouth daily      Probiotic Product (ALIGN) 4 MG CAPS Take 1 capsule by mouth daily      tiZANidine (ZANAFLEX) 4 MG tablet Take 4 mg by mouth nightly as needed      oxybutynin (DITROPAN) 5 MG tablet Take 5 mg by mouth daily      fluticasone (FLONASE) 50 MCG/ACT nasal spray 2 sprays by Nasal route daily      triamcinolone (KENALOG) 0.1 % cream Apply topically 2 times daily Apply topically 2 times daily.  gabapentin (NEURONTIN) 100 MG capsule Take 100 mg by mouth 3 times daily.  Artificial Tear Solution (GENTEAL TEARS) 0.1-0.2-0.3 % SOLN Apply 1 drop to eye daily      levothyroxine (SYNTHROID) 125 MCG tablet Take 125 mcg by mouth Daily       No current facility-administered medications for this visit. Assessment/Plan:     1. NANDO (obstructive sleep apnea)  She is using CPAP with    10  centimeters of H2O with heated humidity. She is using CPAP for about   6-7  hours every night. She is using CPAP with  Nasal  Mask. She said  sleep is restful with the CPAP use. She is compliant with CPAP therapy and benefiting with CPAP use.n o snoring with CPAP use. She had compliance report  Show AHI 0.2 and she is using average  6 hour 20 min. Counseling: CPAP/BiPAP uses, patient advised to use CPAP at least 5-6 hours every night. Driving: patient is advised for extreme caution when driving or operating machinery if there is a feeling of drowsiness, especially while driving it is preferable to stop driving and take a brief nap. Sleep hygiene:Avoid supine sleep, sleep on  sides. Avoid  sleep deprivation. Explained sleep hygiene. Advice to avoid Alcohol and sedative    Time spend over 25 min. Face to face. with greater than 50 % time with counseling regarding CPAP therapy. 2. Morbid obesity ([address]  Patient patient is advised try to lose weight. obesity related risk explained to the patient ,  Current weight:  278 lb (126.1 kg) Lbs. BMI:  Body mass index is 43.54 kg/m². Suggested weight control approaches, including dietary changes , exercise, behavioral modification. She is gong for bariatric surgery      Return in about 2 months (around 1/24/2021) for nando.       James Colon MD

## 2021-01-29 ENCOUNTER — OFFICE VISIT (OUTPATIENT)
Dept: PULMONOLOGY | Age: 41
End: 2021-01-29
Payer: COMMERCIAL

## 2021-01-29 VITALS
OXYGEN SATURATION: 99 % | TEMPERATURE: 96.7 F | SYSTOLIC BLOOD PRESSURE: 124 MMHG | HEART RATE: 61 BPM | BODY MASS INDEX: 38.77 KG/M2 | RESPIRATION RATE: 16 BRPM | DIASTOLIC BLOOD PRESSURE: 68 MMHG | WEIGHT: 247 LBS | HEIGHT: 67 IN

## 2021-01-29 DIAGNOSIS — G47.33 OSA (OBSTRUCTIVE SLEEP APNEA): Primary | ICD-10-CM

## 2021-01-29 DIAGNOSIS — E66.9 OBESITY (BMI 30-39.9): ICD-10-CM

## 2021-01-29 PROCEDURE — G8484 FLU IMMUNIZE NO ADMIN: HCPCS | Performed by: INTERNAL MEDICINE

## 2021-01-29 PROCEDURE — G8427 DOCREV CUR MEDS BY ELIG CLIN: HCPCS | Performed by: INTERNAL MEDICINE

## 2021-01-29 PROCEDURE — 99214 OFFICE O/P EST MOD 30 MIN: CPT | Performed by: INTERNAL MEDICINE

## 2021-01-29 PROCEDURE — 1036F TOBACCO NON-USER: CPT | Performed by: INTERNAL MEDICINE

## 2021-01-29 PROCEDURE — G8417 CALC BMI ABV UP PARAM F/U: HCPCS | Performed by: INTERNAL MEDICINE

## 2021-01-29 ASSESSMENT — ENCOUNTER SYMPTOMS
EYE ITCHING: 0
VOMITING: 0
EYE DISCHARGE: 0
SHORTNESS OF BREATH: 0
WHEEZING: 0
SINUS PRESSURE: 0
VOICE CHANGE: 0
NAUSEA: 0
CHEST TIGHTNESS: 0
ABDOMINAL PAIN: 0
DIARRHEA: 0
TROUBLE SWALLOWING: 0
SORE THROAT: 0
RHINORRHEA: 0
COUGH: 0

## 2021-01-29 NOTE — PROGRESS NOTES
Subjective:     Robert Post is a 41 y.o. female who complains today of:     Chief Complaint   Patient presents with    Follow-up     two month f/u for NANDO. HPI  She is using CPAP with   10  centimeters of H2O with heated humidity. She is using CPAP for about  7-8   hours every night. She is using CPAP with  Nasal  Mask. She said  sleep is restful with the CPAP use. She is compliant with CPAP therapy and benefiting with CPAP use. No snoring with CPAP use. No complaint of daytime sleepiness or tiredness with CPAP use. She denies taking naps. No sleepiness with driving. She denies difficulty falling asleep or staying asleep. She had bariatric surgery at [place] [address] [address] 12/21/20   Lost about 31 lbs. Allergies:  Dye [iodides], Naproxen, and Red dye  Past Medical History:   Diagnosis Date    Allergic rhinitis     Depressive disorder     Hypothyroidism     Inverted nipple     Migraine     Obesity     NANDO (obstructive sleep apnea)     Osteoarthritis     PTSD (post-traumatic stress disorder)      Past Surgical History:   Procedure Laterality Date    HYSTERECTOMY      JOINT REPLACEMENT Left      No family history on file.   Social History     Socioeconomic History    Marital status: Single     Spouse name: Not on file    Number of children: Not on file    Years of education: Not on file    Highest education level: Not on file   Occupational History    Not on file   Social Needs    Financial resource strain: Not on file    Food insecurity     Worry: Not on file     Inability: Not on file    Transportation needs     Medical: Not on file     Non-medical: Not on file   Tobacco Use    Smoking status: Never Smoker    Smokeless tobacco: Never Used   Substance and Sexual Activity    Alcohol use: Not on file     Comment: social    Drug use: Never    Sexual activity: Not on file   Lifestyle    Physical activity     Days per week: Not on file     Minutes per session: Not on in acute distress. Appearance: She is well-developed. She is not diaphoretic. HENT:      Head: Normocephalic and atraumatic. Nose: Nose normal.   Eyes:      Pupils: Pupils are equal, round, and reactive to light. Neck:      Thyroid: No thyromegaly. Vascular: No JVD. Trachea: No tracheal deviation. Cardiovascular:      Rate and Rhythm: Normal rate and regular rhythm. Heart sounds: No murmur. No friction rub. No gallop. Pulmonary:      Effort: No respiratory distress. Breath sounds: No wheezing or rales. Chest:      Chest wall: No tenderness. Abdominal:      General: There is no distension. Tenderness: There is no abdominal tenderness. There is no rebound. Musculoskeletal: Normal range of motion. Lymphadenopathy:      Cervical: No cervical adenopathy. Skin:     General: Skin is warm and dry. Neurological:      Mental Status: She is alert and oriented to person, place, and time.       Coordination: Coordination normal.         Current Outpatient Medications   Medication Sig Dispense Refill    CPAP Machine MISC by Does not apply route New CPAP at 10 cm 1 each 0    acetaminophen (AMINOFEN) 325 MG tablet Take 2 tablets by mouth every 6 hours as needed for Pain 20 tablet 0    Capsaicin 0.1 % CREA Apply 1 applicator topically 3 times daily as needed (back or neck pain) 56.6 g 0    albuterol (PROVENTIL) (5 MG/ML) 0.5% nebulizer solution Take 2.5 mg by nebulization every 6 hours as needed for Wheezing      loratadine (CLARITIN) 10 MG capsule Take 10 mg by mouth daily      polyethylene glycol (MIRALAX) packet Take 17 g by mouth daily      omeprazole (PRILOSEC) 20 MG delayed release capsule Take 20 mg by mouth daily      Probiotic Product (ALIGN) 4 MG CAPS Take 1 capsule by mouth daily      tiZANidine (ZANAFLEX) 4 MG tablet Take 4 mg by mouth nightly as needed      oxybutynin (DITROPAN) 5 MG tablet Take 5 mg by mouth daily      fluticasone (FLONASE) 50 MCG/ACT nasal spray 2 sprays by Nasal route daily      triamcinolone (KENALOG) 0.1 % cream Apply topically 2 times daily Apply topically 2 times daily.  gabapentin (NEURONTIN) 100 MG capsule Take 100 mg by mouth 3 times daily.  Artificial Tear Solution (GENTEAL TEARS) 0.1-0.2-0.3 % SOLN Apply 1 drop to eye daily      levothyroxine (SYNTHROID) 125 MCG tablet Take 125 mcg by mouth Daily       No current facility-administered medications for this visit. Assessment/Plan:     1. NANDO (obstructive sleep apnea)  She is using CPAP with 10 centimeters of H2O with heated humidity. She is using CPAP for about 7-8 hours every night. She is using CPAP with  Nasal  Mask. She said  sleep is restful with the CPAP use. She is compliant with CPAP therapy and benefiting with CPAP use. No snoring with CPAP use. Continue CPAP as before. No need for CPAP supply. Counseling: CPAP/BiPAP uses, She advised to use CPAP at least 5-6 hours every night. Driving: She is advised for extreme caution when driving or operating machinery if there is a feeling of drowsiness, especially while driving it is preferable to stop driving and take a brief nap. Sleep hygiene:Avoid supine sleep, sleep on  sides. Avoid  sleep deprivation. Explained sleep hygiene. Advice to avoid Alcohol and sedative    Time spend over 31 min. Face to face. with greater than 50 % time with counseling regarding CPAP therapy. 2. Obesity (BMI 30-39. 9)  She is advised try to lose weight. obesity related risk explained to the patient ,  Current weight:  247 lb (112 kg) Lbs. BMI:  Body mass index is 38.69 kg/m². Suggested weight control approaches, including dietary changes , exercise, behavioral modification. She had bariatric surgery at [address] 12/21/20 , Lost about 31 lbs. Return in about 4 months (around 5/29/2021).       Vinicius Zabala MD

## 2021-07-07 ENCOUNTER — HOSPITAL ENCOUNTER (OUTPATIENT)
Dept: WOMENS IMAGING | Age: 41
Discharge: HOME | End: 2021-07-09
Payer: COMMERCIAL

## 2021-07-07 VITALS — BODY MASS INDEX: 38.69 KG/M2 | HEIGHT: 67 IN

## 2021-07-07 DIAGNOSIS — Z12.31 SCREENING MAMMOGRAM, ENCOUNTER FOR: ICD-10-CM

## 2021-07-07 PROCEDURE — 77063 BREAST TOMOSYNTHESIS BI: CPT

## 2021-12-08 ENCOUNTER — APPOINTMENT (OUTPATIENT)
Dept: CT IMAGING | Age: 41
End: 2021-12-08
Payer: COMMERCIAL

## 2021-12-08 ENCOUNTER — HOSPITAL ENCOUNTER (EMERGENCY)
Age: 41
Discharge: HOME | End: 2021-12-08
Payer: COMMERCIAL

## 2021-12-08 VITALS
BODY MASS INDEX: 25.9 KG/M2 | HEIGHT: 67 IN | TEMPERATURE: 97.6 F | OXYGEN SATURATION: 100 % | SYSTOLIC BLOOD PRESSURE: 155 MMHG | DIASTOLIC BLOOD PRESSURE: 77 MMHG | HEART RATE: 64 BPM | WEIGHT: 165 LBS | RESPIRATION RATE: 16 BRPM

## 2021-12-08 DIAGNOSIS — N12 PYELONEPHRITIS: Primary | ICD-10-CM

## 2021-12-08 LAB
ALBUMIN SERPL-MCNC: 4.5 G/DL (ref 3.5–4.6)
ALP BLD-CCNC: 72 U/L (ref 40–130)
ALT SERPL-CCNC: 12 U/L (ref 0–33)
ANION GAP SERPL CALCULATED.3IONS-SCNC: 14 MEQ/L (ref 9–15)
AST SERPL-CCNC: 15 U/L (ref 0–35)
BACTERIA: ABNORMAL /HPF
BASOPHILS ABSOLUTE: 0.1 K/UL (ref 0–0.2)
BASOPHILS RELATIVE PERCENT: 0.7 %
BILIRUB SERPL-MCNC: 1 MG/DL (ref 0.2–0.7)
BILIRUBIN URINE: NEGATIVE
BLOOD, URINE: ABNORMAL
BUN BLDV-MCNC: 11 MG/DL (ref 6–20)
CALCIUM SERPL-MCNC: 10 MG/DL (ref 8.5–9.9)
CHLORIDE BLD-SCNC: 102 MEQ/L (ref 95–107)
CLARITY: CLEAR
CO2: 23 MEQ/L (ref 20–31)
COLOR: YELLOW
CREAT SERPL-MCNC: 0.52 MG/DL (ref 0.5–0.9)
EOSINOPHILS ABSOLUTE: 0.1 K/UL (ref 0–0.7)
EOSINOPHILS RELATIVE PERCENT: 1.5 %
EPITHELIAL CELLS, UA: ABNORMAL /HPF (ref 0–5)
GFR AFRICAN AMERICAN: >60
GFR NON-AFRICAN AMERICAN: >60
GLOBULIN: 3.6 G/DL (ref 2.3–3.5)
GLUCOSE BLD-MCNC: 87 MG/DL (ref 70–99)
GLUCOSE URINE: NEGATIVE MG/DL
HCT VFR BLD CALC: 38.2 % (ref 37–47)
HEMOGLOBIN: 13.1 G/DL (ref 12–16)
HYALINE CASTS: ABNORMAL /HPF (ref 0–5)
KETONES, URINE: 15 MG/DL
LEUKOCYTE ESTERASE, URINE: ABNORMAL
LYMPHOCYTES ABSOLUTE: 1.8 K/UL (ref 1–4.8)
LYMPHOCYTES RELATIVE PERCENT: 20.7 %
MCH RBC QN AUTO: 32.2 PG (ref 27–31.3)
MCHC RBC AUTO-ENTMCNC: 34.2 % (ref 33–37)
MCV RBC AUTO: 94.1 FL (ref 82–100)
MONOCYTES ABSOLUTE: 1 K/UL (ref 0.2–0.8)
MONOCYTES RELATIVE PERCENT: 10.8 %
NEUTROPHILS ABSOLUTE: 5.9 K/UL (ref 1.4–6.5)
NEUTROPHILS RELATIVE PERCENT: 66.3 %
NITRITE, URINE: NEGATIVE
PDW BLD-RTO: 13.7 % (ref 11.5–14.5)
PH UA: 5.5 (ref 5–9)
PLATELET # BLD: 276 K/UL (ref 130–400)
POTASSIUM SERPL-SCNC: 3.8 MEQ/L (ref 3.4–4.9)
PROTEIN UA: 100 MG/DL
RBC # BLD: 4.06 M/UL (ref 4.2–5.4)
RBC UA: ABNORMAL /HPF (ref 0–5)
SODIUM BLD-SCNC: 139 MEQ/L (ref 135–144)
SPECIFIC GRAVITY UA: 1.01 (ref 1–1.03)
TOTAL PROTEIN: 8.1 G/DL (ref 6.3–8)
URINE REFLEX TO CULTURE: YES
UROBILINOGEN, URINE: 1 E.U./DL
WBC # BLD: 8.9 K/UL (ref 4.8–10.8)
WBC UA: >100 /HPF (ref 0–5)

## 2021-12-08 PROCEDURE — 87086 URINE CULTURE/COLONY COUNT: CPT

## 2021-12-08 PROCEDURE — 96375 TX/PRO/DX INJ NEW DRUG ADDON: CPT

## 2021-12-08 PROCEDURE — 80053 COMPREHEN METABOLIC PANEL: CPT

## 2021-12-08 PROCEDURE — 36415 COLL VENOUS BLD VENIPUNCTURE: CPT

## 2021-12-08 PROCEDURE — 87186 SC STD MICRODIL/AGAR DIL: CPT

## 2021-12-08 PROCEDURE — 6360000002 HC RX W HCPCS: Performed by: PERSONAL EMERGENCY RESPONSE ATTENDANT

## 2021-12-08 PROCEDURE — 87077 CULTURE AEROBIC IDENTIFY: CPT

## 2021-12-08 PROCEDURE — 96365 THER/PROPH/DIAG IV INF INIT: CPT

## 2021-12-08 PROCEDURE — 96366 THER/PROPH/DIAG IV INF ADDON: CPT

## 2021-12-08 PROCEDURE — 74150 CT ABDOMEN W/O CONTRAST: CPT

## 2021-12-08 PROCEDURE — 81001 URINALYSIS AUTO W/SCOPE: CPT

## 2021-12-08 PROCEDURE — 99283 EMERGENCY DEPT VISIT LOW MDM: CPT

## 2021-12-08 PROCEDURE — 85025 COMPLETE CBC W/AUTO DIFF WBC: CPT

## 2021-12-08 PROCEDURE — 2580000003 HC RX 258: Performed by: PERSONAL EMERGENCY RESPONSE ATTENDANT

## 2021-12-08 RX ORDER — CEPHALEXIN 500 MG/1
1000 CAPSULE ORAL 2 TIMES DAILY
Qty: 28 CAPSULE | Refills: 0 | Status: SHIPPED | OUTPATIENT
Start: 2021-12-08 | End: 2021-12-15

## 2021-12-08 RX ORDER — MORPHINE SULFATE 4 MG/ML
4 INJECTION, SOLUTION INTRAMUSCULAR; INTRAVENOUS ONCE
Status: COMPLETED | OUTPATIENT
Start: 2021-12-08 | End: 2021-12-08

## 2021-12-08 RX ORDER — KETOROLAC TROMETHAMINE 30 MG/ML
30 INJECTION, SOLUTION INTRAMUSCULAR; INTRAVENOUS ONCE
Status: COMPLETED | OUTPATIENT
Start: 2021-12-08 | End: 2021-12-08

## 2021-12-08 RX ORDER — ONDANSETRON 2 MG/ML
4 INJECTION INTRAMUSCULAR; INTRAVENOUS ONCE
Status: COMPLETED | OUTPATIENT
Start: 2021-12-08 | End: 2021-12-08

## 2021-12-08 RX ADMIN — KETOROLAC TROMETHAMINE 30 MG: 30 INJECTION, SOLUTION INTRAMUSCULAR; INTRAVENOUS at 19:22

## 2021-12-08 RX ADMIN — CEFTRIAXONE SODIUM 1000 MG: 1 INJECTION, POWDER, FOR SOLUTION INTRAMUSCULAR; INTRAVENOUS at 21:31

## 2021-12-08 RX ADMIN — MORPHINE SULFATE 4 MG: 4 INJECTION, SOLUTION INTRAMUSCULAR; INTRAVENOUS at 19:22

## 2021-12-08 RX ADMIN — ONDANSETRON 4 MG: 2 INJECTION INTRAMUSCULAR; INTRAVENOUS at 19:22

## 2021-12-08 ASSESSMENT — ENCOUNTER SYMPTOMS
BLOOD IN STOOL: 0
SORE THROAT: 0
COUGH: 0
DIARRHEA: 0
SHORTNESS OF BREATH: 0
NAUSEA: 0
RHINORRHEA: 0
ABDOMINAL PAIN: 0
VOMITING: 0
COLOR CHANGE: 0

## 2021-12-08 ASSESSMENT — PAIN DESCRIPTION - ONSET: ONSET: ON-GOING

## 2021-12-08 ASSESSMENT — PAIN DESCRIPTION - DESCRIPTORS: DESCRIPTORS: PRESSURE

## 2021-12-08 ASSESSMENT — PAIN DESCRIPTION - PAIN TYPE: TYPE: ACUTE PAIN

## 2021-12-08 ASSESSMENT — PAIN DESCRIPTION - LOCATION: LOCATION: FLANK

## 2021-12-08 ASSESSMENT — PAIN SCALES - GENERAL
PAINLEVEL_OUTOF10: 10
PAINLEVEL_OUTOF10: 10

## 2021-12-08 ASSESSMENT — PAIN DESCRIPTION - ORIENTATION: ORIENTATION: LEFT

## 2021-12-08 NOTE — ED TRIAGE NOTES
Pt in with bilateral flank pain, malodorous and cloudy urine. Pt states 10/10 pain that started in left and now hurts in her rt. Pt is A&Ox4, skin intact, msps intact, afebrile, breaths are equal and unlabored.  Pt had bariatric surgery Dec 2020

## 2021-12-08 NOTE — ED PROVIDER NOTES
[facility] ED  eMERGENCY dEPARTMENT eNCOUnter      Pt Name: Jeny Rodriguez  MRN: [id number]  [place] 1980  Date of evaluation: 12/8/2021  Provider: JOSEPH Blank      HISTORY OF PRESENT ILLNESS    Jeny Rodriguez is a 41 y.o. female with PMHx of depression, hypothyroidism, obesity, PTSD presents to the emergency department with bilateral flank pain. Patient states 3 days ago she started with bilateral flank/side pain that is constant. She denies fevers, cough, chest pain, shortness of breath, abdominal pain, nausea, vomiting, diarrhea, constipation, urinary symptoms, vaginal discharge. No falls or injuries. She did not take anything at home for her symptoms. She has never had this pain in the past.    HPI    Nursing Notes were reviewed. REVIEW OF SYSTEMS       Review of Systems   Constitutional: Negative for appetite change, chills and fever. HENT: Negative for congestion, rhinorrhea and sore throat. Respiratory: Negative for cough and shortness of breath. Cardiovascular: Negative for chest pain. Gastrointestinal: Negative for abdominal pain, blood in stool, diarrhea, nausea and vomiting. Genitourinary: Positive for flank pain. Negative for difficulty urinating. Musculoskeletal: Negative for neck stiffness. Skin: Negative for color change and rash. Neurological: Negative for dizziness, syncope, weakness, light-headedness, numbness and headaches. All other systems reviewed and are negative.             PAST MEDICAL HISTORY     Past Medical History:   Diagnosis Date    Allergic rhinitis     Depressive disorder     Hypothyroidism     Inverted nipple     Migraine     Obesity     NANDO (obstructive sleep apnea)     Osteoarthritis     PTSD (post-traumatic stress disorder)          SURGICAL HISTORY       Past Surgical History:   Procedure Laterality Date    HYSTERECTOMY      Partial-still has 1 ovary    JOINT REPLACEMENT Left     OVARY REMOVAL      only removed 1 ovary         CURRENT MEDICATIONS       Previous Medications    ACETAMINOPHEN (AMINOFEN) 325 MG TABLET    Take 2 tablets by mouth every 6 hours as needed for Pain    ALBUTEROL (PROVENTIL) (5 MG/ML) 0.5% NEBULIZER SOLUTION    Take 2.5 mg by nebulization every 6 hours as needed for Wheezing    ARTIFICIAL TEAR SOLUTION (GENTEAL TEARS) 0.1-0.2-0.3 % SOLN    Apply 1 drop to eye daily    CAPSAICIN 0.1 % CREA    Apply 1 applicator topically 3 times daily as needed (back or neck pain)    CPAP MACHINE MISC    by Does not apply route New CPAP at 10 cm    FLUTICASONE (FLONASE) 50 MCG/ACT NASAL SPRAY    2 sprays by Nasal route daily    GABAPENTIN (NEURONTIN) 100 MG CAPSULE    Take 100 mg by mouth 3 times daily. LEVOTHYROXINE (SYNTHROID) 125 MCG TABLET    Take 125 mcg by mouth Daily    LORATADINE (CLARITIN) 10 MG CAPSULE    Take 10 mg by mouth daily    OMEPRAZOLE (PRILOSEC) 20 MG DELAYED RELEASE CAPSULE    Take 20 mg by mouth daily    OXYBUTYNIN (DITROPAN) 5 MG TABLET    Take 5 mg by mouth daily    POLYETHYLENE GLYCOL (MIRALAX) PACKET    Take 17 g by mouth daily    PROBIOTIC PRODUCT (ALIGN) 4 MG CAPS    Take 1 capsule by mouth daily    TIZANIDINE (ZANAFLEX) 4 MG TABLET    Take 4 mg by mouth nightly as needed    TRIAMCINOLONE (KENALOG) 0.1 % CREAM    Apply topically 2 times daily Apply topically 2 times daily.        ALLERGIES     Dye [iodides], Naproxen, and Red dye    FAMILY HISTORY       Family History   Problem Relation Age of Onset    Breast Cancer Sister           SOCIAL HISTORY       Social History     Socioeconomic History    Marital status: Single     Spouse name: None    Number of children: None    Years of education: None    Highest education level: None   Occupational History    None   Tobacco Use    Smoking status: Never Smoker    Smokeless tobacco: Never Used   Vaping Use    Vaping Use: Never used   Substance and Sexual Activity    Alcohol use: None     Comment: social    Drug use: Never    Sexual activity: None   Other Topics Concern    None   Social History Narrative    None     Social Determinants of Health     Financial Resource Strain:     Difficulty of Paying Living Expenses: Not on file   Food Insecurity:     Worried About Running Out of Food in the Last Year: Not on file    Otilia of Food in the Last Year: Not on file   Transportation Needs:     Lack of Transportation (Medical): Not on file    Lack of Transportation (Non-Medical): Not on file   Physical Activity:     Days of Exercise per Week: Not on file    Minutes of Exercise per Session: Not on file   Stress:     Feeling of Stress : Not on file   Social Connections:     Frequency of Communication with Friends and Family: Not on file    Frequency of Social Gatherings with Friends and Family: Not on file    Attends Je[employer]'s witness Services: Not on file    Active Member of [address] [place] [address] [employer] or Organizations: Not on file    Attends Club or Organization Meetings: Not on file    Marital Status: Not on file   Intimate Partner Violence:     Fear of Current or Ex-Partner: Not on file    Emotionally Abused: Not on file    Physically Abused: Not on file    Sexually Abused: Not on file   Housing Stability:     Unable to Pay for Housing in the Last Year: Not on file    Number of [place] in the Last Year: Not on file    Unstable Housing in the Last Year: Not on file         PHYSICAL EXAM         ED Triage Vitals [12/08/21 1757]   BP Temp Temp Source Pulse Resp SpO2 Height Weight   (!) 155/77 97.6 °F (36.4 °C) Oral 64 16 100 % 5' 7\" (1.702 m) 165 lb (74.8 kg)       Physical Exam  Constitutional:       Appearance: She is well-developed. HENT:      Head: Normocephalic and atraumatic. Eyes:      Conjunctiva/sclera: Conjunctivae normal.      Pupils: Pupils are equal, round, and reactive to light. Neck:      Trachea: No tracheal deviation. Cardiovascular:      Heart sounds: Normal heart sounds.    Pulmonary:      Effort: Pulmonary effort is normal. No respiratory distress. Breath sounds: Normal breath sounds. No stridor. Abdominal:      General: Bowel sounds are normal. There is no distension. Palpations: Abdomen is soft. There is no mass. Tenderness: There is no abdominal tenderness. There is no guarding or rebound. Comments: Patient has moderate bilateral side/flank pain. No CVA tenderness. No abdominal tenderness to palpation. Abdomen soft nondistended, no rebound or rigidity, no pulsatile mass or bruit, no ecchymosis. Musculoskeletal:         General: Normal range of motion. Cervical back: Normal range of motion and neck supple. Skin:     General: Skin is warm and dry. Capillary Refill: Capillary refill takes less than 2 seconds. Findings: No rash. Neurological:      Mental Status: She is alert and oriented to person, place, and time. Deep Tendon Reflexes: Reflexes are normal and symmetric. Psychiatric:         Behavior: Behavior normal.         Thought Content:  Thought content normal.         Judgment: Judgment normal.         DIAGNOSTIC RESULTS     EKG:All EKG's are interpreted by the Emergency Department Physician who either signs or Co-signs this chart in the absence of a cardiologist.        RADIOLOGY:   Non-plain film images such as CT, Ultrasound and MRI are read by theradiologist. Plain radiographic images are visualized and preliminarily interpreted by the emergency physician with the below findings:    Interpretation per theRadiologist below, if available at the time of this note:    CT KIDNEY WO CONTRAST    (Results Pending)           LABS:  Labs Reviewed   URINE RT REFLEX TO CULTURE - Abnormal; Notable for the following components:       Result Value    Ketones, Urine 15 (*)     Blood, Urine MODERATE (*)     Protein,  (*)     Leukocyte Esterase, Urine MODERATE (*)     All other components within normal limits   COMPREHENSIVE METABOLIC PANEL - Abnormal; Notable for the following components:    Calcium 10.0 (*)     Total Protein 8.1 (*)     Total Bilirubin 1.0 (*)     Globulin 3.6 (*)     All other components within normal limits   CBC WITH AUTO DIFFERENTIAL - Abnormal; Notable for the following components:    RBC 4.06 (*)     MCH 32.2 (*)     Monocytes Absolute 1.0 (*)     All other components within normal limits   MICROSCOPIC URINALYSIS - Abnormal; Notable for the following components:    Bacteria, UA MANY (*)     WBC, UA >100 (*)     RBC, UA 10-20 (*)     All other components within normal limits   CULTURE, URINE       All other labs were within normal range or not returned as of this dictation. EMERGENCY DEPARTMENT COURSE and DIFFERENTIAL DIAGNOSIS/MDM:   Vitals:    Vitals:    12/08/21 1757   BP: (!) 155/77   Pulse: 64   Resp: 16   Temp: 97.6 °F (36.4 °C)   TempSrc: Oral   SpO2: 100%   Weight: 165 lb (74.8 kg)   Height: 5' 7\" (1.702 m)         MDM    Blood work unremarkable. UTI present and given rocephin IV. CT renal shows no acute process. Patient was given Zofran, morphine, Toradol. On reassessment she appears much more comfortable in no apparent distress. She will be placed on Keflex aware to keep her self hydrated at home, and aware to alternate Motrin Tylenol at home for pain control. Standard anticipatory guidance given to patient upon discharge. Have given them a specific time frame in which to follow-up and who to follow-up with. I have also advised them that they should return to the emergency department if they get worse, or not getting better or develop any new or concerning symptoms. Patient demonstrates understanding. CRITICAL CARE TIME   Total Critical Caretime was 0 minutes, excluding separately reportable procedures. There was a high probability of clinically significant/life threatening deterioration in the patient's condition which required my urgent intervention. Procedures    FINAL IMPRESSION      1.  Pyelonephritis DISPOSITION/PLAN   DISPOSITION        PATIENT REFERRED TO:  Rayna Clarke  [address] [address]  [phone]            DISCHARGE MEDICATIONS:  New Prescriptions    CEPHALEXIN (KEFLEX) 500 MG CAPSULE    Take 2 capsules by mouth 2 times daily for 7 days          (Please notethat portions of this note were completed with a voice recognition program.  Efforts were made to edit the dictations but occasionally words are mis-transcribed. )    JOSEPH Pandya (electronically signed)  Emergency Physician Assistant         Ronal Tong  12/08/21 7934

## 2021-12-11 LAB
ORGANISM: ABNORMAL
URINE CULTURE, ROUTINE: ABNORMAL

## 2022-08-19 ENCOUNTER — HOSPITAL ENCOUNTER (OUTPATIENT)
Dept: GENERAL RADIOLOGY | Age: 42
Discharge: HOME | End: 2022-08-21
Payer: COMMERCIAL

## 2022-08-19 ENCOUNTER — OFFICE VISIT (OUTPATIENT)
Dept: CARDIOLOGY CLINIC | Age: 42
End: 2022-08-19
Payer: COMMERCIAL

## 2022-08-19 VITALS
SYSTOLIC BLOOD PRESSURE: 138 MMHG | BODY MASS INDEX: 27.41 KG/M2 | RESPIRATION RATE: 16 BRPM | HEART RATE: 60 BPM | WEIGHT: 175 LBS | DIASTOLIC BLOOD PRESSURE: 82 MMHG

## 2022-08-19 DIAGNOSIS — R07.9 CHEST PAIN, UNSPECIFIED TYPE: ICD-10-CM

## 2022-08-19 DIAGNOSIS — M54.9 BACK PAIN, UNSPECIFIED BACK LOCATION, UNSPECIFIED BACK PAIN LATERALITY, UNSPECIFIED CHRONICITY: ICD-10-CM

## 2022-08-19 DIAGNOSIS — R00.1 BRADYCARDIA: Primary | ICD-10-CM

## 2022-08-19 PROCEDURE — 93000 ELECTROCARDIOGRAM COMPLETE: CPT | Performed by: NURSE PRACTITIONER

## 2022-08-19 PROCEDURE — 99214 OFFICE O/P EST MOD 30 MIN: CPT | Performed by: NURSE PRACTITIONER

## 2022-08-19 PROCEDURE — G8427 DOCREV CUR MEDS BY ELIG CLIN: HCPCS | Performed by: NURSE PRACTITIONER

## 2022-08-19 PROCEDURE — G8419 CALC BMI OUT NRM PARAM NOF/U: HCPCS | Performed by: NURSE PRACTITIONER

## 2022-08-19 PROCEDURE — 1036F TOBACCO NON-USER: CPT | Performed by: NURSE PRACTITIONER

## 2022-08-19 PROCEDURE — 72110 X-RAY EXAM L-2 SPINE 4/>VWS: CPT

## 2022-08-19 RX ORDER — ERGOCALCIFEROL 1.25 MG/1
50000 CAPSULE ORAL WEEKLY
COMMUNITY

## 2022-08-19 NOTE — PROGRESS NOTES
Patient: Chon Castellanos  YOB: 1980  MRN: [id number]    Chief Complaint:   Chief Complaint   Patient presents with    New Patient     SELF REFERRAL    Bradycardia    Shortness of Breath         Subjective/HPI   8/19/2022: Patient seen for initial medical evaluation. Patient with past medical history hypothyroidism, obesity, NANDO, PTSD. Patient seen today per her request for bradycardia. States she had an EKG at PCPs office that showed heart rate in the 40s. EKG in office today shows normal sinus rhythm, heart rate in the 60s. Patient also with complaints of intermittent, midsternal chest pain/pressure that radiates to left arm. States that sometimes pain feels like a burning or squeezing. Reports shortness of breath with exertion. Denies any other associated symptoms. Denies aggravating or alleviating factors. Patient also reports intermittent palpitations and feeling like her heart is skipping beats. Pt denies  change in exercise capacity, fatigue,  nausea, vomiting, diarrhea, constipation, motor weakness, insomnia, weight loss, syncope, dizziness, lightheadedness, PND, orthopnea, or claudication. No bleeding issues. Pt denies CHF type symptoms. No recent hospitalizations. Pt is compliant with all Rx medications. Blood pressure and heart rate are under control. Allergies   Allergen Reactions    Dye [Iodides] Anaphylaxis    Naproxen Swelling     Facing swelling bilat eyes    Red Dye Hives       Current Outpatient Medications   Medication Sig Dispense Refill    VITAMIN A PO Take by mouth      Cyanocobalamin (VITAMIN B 12 PO) Inject into the muscle every 30 days      vitamin D (ERGOCALCIFEROL) 1.25 MG (09915 UT) CAPS capsule Take 50,000 Units by mouth once a week      acetaminophen (AMINOFEN) 325 MG tablet Take 2 tablets by mouth every 6 hours as needed for Pain 20 tablet 0     No current facility-administered medications for this visit.        Past Medical History:   Diagnosis Date Allergic rhinitis     Depressive disorder     Hypothyroidism     Inverted nipple     Migraine     Obesity     NANDO (obstructive sleep apnea)     Osteoarthritis     PTSD (post-traumatic stress disorder)        Past Surgical History:   Procedure Laterality Date    GASTRIC BYPASS SURGERY  2020    HYSTERECTOMY (CERVIX STATUS UNKNOWN)      Partial-still has 1 ovary    JOINT REPLACEMENT Left     OVARY REMOVAL      only removed 1 ovary       Social History     Socioeconomic History    Marital status: Single     Spouse name: None    Number of children: None    Years of education: None    Highest education level: None   Tobacco Use    Smoking status: Never    Smokeless tobacco: Never   Vaping Use    Vaping Use: Never used   Substance and Sexual Activity    Drug use: Never       Family History   Problem Relation Age of Onset    Breast Cancer Sister          Review of Systems:   Review of Systems      Physical Examination:    /82 (Site: Right Upper Arm, Position: Sitting, Cuff Size: Medium Adult)   Pulse 60   Resp 16   Wt 175 lb (79.4 kg)   BMI 27.41 kg/m²    Physical Exam    LABS:  CBC:   Lab Results   Component Value Date/Time    WBC 4.7 07/29/2022 11:06 AM    RBC 3.23 07/29/2022 11:06 AM    HGB 10.7 07/29/2022 11:06 AM    HCT 31.8 07/29/2022 11:06 AM    MCV 98.5 07/29/2022 11:06 AM    MCH 33.2 07/29/2022 11:06 AM    MCHC 33.7 07/29/2022 11:06 AM    RDW 14.6 07/29/2022 11:06 AM     07/29/2022 11:06 AM    MPV 9.5 03/27/2013 03:12 PM     Lipids:  Lab Results   Component Value Date    CHOL 134 07/29/2022     Lab Results   Component Value Date    TRIG 49 07/29/2022     Lab Results   Component Value Date    HDL 75 (H) 07/29/2022     Lab Results   Component Value Date    LDLCALC 49 07/29/2022     No results found for: LABVLDL, VLDL  No results found for: CHOLHDLRATIO  CMP:    Lab Results   Component Value Date/Time     07/29/2022 11:06 AM    K 3.4 07/29/2022 11:06 AM     07/29/2022 11:06 AM    CO2 23 07/29/2022 11:06 AM    BUN 12 07/29/2022 11:06 AM    CREATININE 0.57 07/29/2022 11:06 AM    GFRAA >60.0 07/29/2022 11:06 AM    LABGLOM >60.0 07/29/2022 11:06 AM    GLUCOSE 79 07/29/2022 11:06 AM    PROT 6.7 07/29/2022 11:06 AM    LABALBU 3.8 07/29/2022 11:06 AM    CALCIUM 9.1 07/29/2022 11:06 AM    BILITOT 0.4 07/29/2022 11:06 AM    ALKPHOS 52 07/29/2022 11:06 AM    AST 19 07/29/2022 11:06 AM    ALT 19 07/29/2022 11:06 AM     BMP:    Lab Results   Component Value Date/Time     07/29/2022 11:06 AM    K 3.4 07/29/2022 11:06 AM     07/29/2022 11:06 AM    CO2 23 07/29/2022 11:06 AM    BUN 12 07/29/2022 11:06 AM    LABALBU 3.8 07/29/2022 11:06 AM    CREATININE 0.57 07/29/2022 11:06 AM    CALCIUM 9.1 07/29/2022 11:06 AM    GFRAA >60.0 07/29/2022 11:06 AM    LABGLOM >60.0 07/29/2022 11:06 AM    GLUCOSE 79 07/29/2022 11:06 AM     Magnesium:  No results found for: MG  TSH:  Lab Results   Component Value Date    TSH 2.160 07/29/2022     . result  No results for input(s): PROBNP in the last 72 hours. No results for input(s): INR in the last 72 hours. Patient Active Problem List   Diagnosis    PTSD (post-traumatic stress disorder)    NANDO (obstructive sleep apnea)    Morbid obesity ([facility] [address]    Hypothyroidism       Assessment   Diagnosis Orders   1. Bradycardia  EKG 12 lead    NM MYOCARDIAL SPECT REST EXERCISE OR RX    ECHO Complete 2D W Doppler W Color    Cardiac event monitor      2.  Chest pain, unspecified type  NM MYOCARDIAL SPECT REST EXERCISE OR RX    ECHO Complete 2D W Doppler W Color    Cardiac event monitor          Orders Placed This Encounter   Procedures    NM MYOCARDIAL SPECT REST EXERCISE OR RX     Standing Status:   Future     Standing Expiration Date:   8/19/2023     Order Specific Question:   Reason for Exam?     Answer:   Chest pain     Order Specific Question:   Procedure Type     Answer:   Exercise     Order Specific Question:   Reason for exam:     Answer:   cp    Cardiac event monitor     1 WEEK EVENT MONITOR     Standing Status:   Future     Standing Expiration Date:   10/18/2022    EKG 12 lead     Order Specific Question:   Reason for Exam?     Answer:   Bradycardia    ECHO Complete 2D W Doppler W Color     Standing Status:   Future     Standing Expiration Date:   8/19/2023     Order Specific Question:   Reason for exam:     Answer:   CP, SOB      No orders of the defined types were placed in this encounter. Return in about 4 weeks (around 9/16/2022) for follow up with Dr. Maye Barreto, follow up with Ike Perez CNP. Plan  Follow up with PCP for labs and NANDO. 1 week event monitor    Non invasive cardiac testing will be ordered to further evaluate for any ischemic or structural heart disease as a cause of the patient symptoms. We will proceed with a Cardiolite stress test and echo     We will need to continue to monitor muscle and liver enzymes, BUN, CR, and electrolytes. The nature of cardiac risk has been fully discussed with this patient. I have made him aware of his LDL target goal given his cardiovascular risk analysis. I have discussed the appropriate diet. The need for lifelong compliance in order to reduce risk is stressed. A regular exercise program is recommended to help achieve and maintain normal body weight, fitness and improve lipid balance. Details of medical condition explained and patient was warned about adverse consequences of uncontrolled medical conditions and possible side effects of prescribed medications. Patient was advised and encouraged to check blood pressure at home or at a pharmacy, maintain a logbook, and also call us back if blood pressures are above or below the target ranges. Patient clearly understands and agrees to these instructions. Counseling: The patient has been advised to contact us if they experience chest pain, palpitations, progressive SOB, orthopnea, paroxysmal nocturnal dyspnea, or progressive edema.

## 2022-08-19 NOTE — PATIENT INSTRUCTIONS
Cardiac Perfusion Scan: About This Test  What is it? A cardiac perfusion scan measures the amount of blood in your heart muscle at rest and after your heart has been made to work hard. Medicine or exercise can be used to increase the amount of blood that your heart needs. During the scan, a camera takes pictures of your heart after a radioactive tracer is put into a vein in your arm. The tracer travels through the blood and into your heart. As the tracer moves through your heart, areas that have good blood flow absorb the tracer. Areas that don't absorb the tracer may not be getting enough blood or may have been damaged by a heart attack. The pictures show the difference. Two sets of pictures may be made during the test. One set is taken while you are resting. Another set is taken after your heart has been made to work harder (called a stress test). Why is this test done? The test is often done to find out what may be causing chest pain or pressure. It may be done after a heart attack to see if areas of the heart aren't getting enough blood. It also may be used to find out how much your heart has been damaged from the heart attack. How do you prepare for the test?  Tell your doctor ALL the medicines, vitamins, supplements, and herbal remedies you take. Some may increase the risk of problems during your test. Your doctor will tell you if you should stop taking any of them before the test and how soon to do it. If you take aspirin or some other blood thinner, ask your doctor if you should stop taking it before your test. Make sure that you understand exactly what your doctor wants you to do. These medicines increase the risk of bleeding. You may be told not to eat or drink for several hours before the scan. You may be told to avoid alcohol, tobacco, and drinks that have caffeine for at least 24 hours before the test.  Wear comfortable shoes, such as running shoes, and loose shorts or pants.  Don't wear jewelry to the test.  If you are breastfeeding, you may want to pump enough breast milk before the test to get through 1 to 2 days of feeding. The radioactive tracer used in this test can get into your breast milk and is not good for the baby. How is the test done? A cardiac perfusion test can be done while you're resting, after you exercise, or after you take medicine. Or you could have the test after taking medicine and exercising. Before the scans, electrodes will be attached to your chest to help record your heartbeats. You will have a tube, called an IV, put into your arm. Radioactive tracer will be put in the IV. For the resting scan, you will lie on a table. A camera above your chest records the tracer that has moved from your blood into your heart muscle. Several scans will be taken. They take 10 to 30 minutes each. For an exercise scan, you will walk on a treadmill or pedal a stationary bike. Then you have the scan. For a medicine scan, you are given a medicine in your IV that increases the amount of blood that your heart needs. Then you have the scan. How long does a cardiac perfusion scan take? Each scan may take about 30 to 60 minutes. How long the test takes will depend on how many scans you have and how long you wait between scans. What are the risks of a cardiac perfusion scan? Cardiac perfusion scans are usually safe. Anytime you're exposed to radiation, there's a small chance of damage to cells or tissue. That's the case even with the low-level radioactive tracer used for this test. But the chance of damage is very low compared with the benefits of the test.  There will be some risks when the test uses exercise or medicine to stress your heart. The amount of risk depends on the condition of your heart and your general level of health. The risks include:  Fainting. Chest pain. An irregular heartbeat. Heart attack.  There is a slight risk that death may result if a heart attack occurs during the test.  What happens after the test?  You can go home and back to your usual activities right away, unless you are already admitted to the hospital.  How can you care for yourself at home? Drink plenty of fluids for the next 24 hours to help flush the tracer out of your body. If you have kidney, heart, or liver disease and have to limit fluids, talk with your doctor before you increase the amount of fluids you drink. Most of the tracer will leave your body through your urine or stool within a day. So be sure to flush the toilet right after you use it, and wash your hands well with soap and water. The amount of radiation in the tracer is very small. This means it isn't a risk for people to be around you after the test.  Do not breastfeed your baby for 1 or 2 days after this test. During this time, you can give your baby breast milk you stored before the test, or you can give formula. When should you call for help? Call 911 anytime you think you may need emergency care. For example, call if:  You passed out (lost consciousness). You have been diagnosed with angina, and you have angina symptoms that do not go away with rest or are not getting better within 5 minutes after you take a dose of nitroglycerin. You have symptoms of a heart attack. These may include:  Chest pain or pressure, or a strange feeling in the chest.  Sweating. Shortness of breath. Nausea or vomiting. Pain, pressure, or a strange feeling in the back, neck, jaw, or upper belly or in one or both shoulders or arms. Lightheadedness or sudden weakness. A fast or irregular heartbeat. After you call 911, the  may tell you to chew 1 adult-strength or 2 to 4 low-dose aspirin. Wait for an ambulance. Do not try to drive yourself. Watch closely for changes in your health, and be sure to contact your doctor if you have any problems. Follow-up care is a key part of your treatment and safety.  Be sure to make and go to all appointments, and call your doctor if you are having problems. It's also a good idea to keep a list of the medicines you take. Ask your doctor when you can expect to have your test results. Where can you learn more? Go to https://veronica.Growing Stars. org and sign in to your CodeSealer account. Enter T239 in the [facility] box to learn more about \"Cardiac Perfusion Scan: About This Test.\"          Cardiac Perfusion Scan (Medicine): About This Test  What is it? A cardiac perfusion scan measures the amount of blood in your heart muscle at rest and after your heart has been made to work hard. Either medicine or exercise can be used to stress the heart. This information is about using medicine for this test.  During the scan, a camera takes pictures of your heart after a radioactive tracer is injected into a vein in your arm. The tracer travels through the blood and into your heart. As the tracer moves through your heart, areas that have good blood flow absorb the tracer. Areas that don't absorb the tracer may not be getting enough blood or may have been damaged by a heart attack. The pictures show this difference. Two sets of pictures may be made during the test. One set is taken while you are resting. Another set is taken after your heart has been made to work harder (called a stress test). Why is this test done? The test is often done to find out what may be causing chest pain or pressure. It may be done after a heart attack to see if areas of the heart are not getting enough blood or to find out how much your heart has been damaged from the heart attack. How do you prepare for the test?  Tell your doctor ALL the medicines, vitamins, supplements, and herbal remedies you take. Some may increase the risk of problems during your test. Your doctor will tell you if you should stop taking any of them before the test and how soon to do it.   If you take aspirin or some other blood thinner, ask your doctor if you should stop taking it before your test. Make sure that you understand exactly what your doctor wants you to do. These medicines increase the risk of bleeding. Tell your doctor if you are taking any erection-enhancing medicines (such as Cialis, Levitra, or Viagra). You may need to take nitroglycerin during this test, which can cause a serious reaction if you have taken an erection-enhancing medicine within the previous 48 hours. Do not have any caffeine, such as coffee or tea, for 24 hours before the test.  If you are breastfeeding, you may want to pump enough breast milk before the test to get through 1 to 2 days of feeding. The radioactive tracer used in this test can get into your breast milk and is not good for the baby. How is the test done? Resting or baseline scan  You will take your top off and be given a gown to wear. Electrodes will be attached to your chest to keep track of your heartbeats. You will have a tube, called an IV, going into your arm. A small amount of the radioactive tracer will be put in the IV. You will lie on your back or your stomach on a table with a large camera positioned above your chest. The camera records the tracer's signals as it moves through your blood. You will be asked to remain very still during each scan, which takes about 5 to 10 minutes. Several scans will be taken. Stress scan using medicine  The stress scan is done in two parts. In many hospitals, you first have the resting scan. You are then given a medicine that makes your heart work harder and you have another scan. Sometimes the stress scan is done first.  You will have a test called an electrocardiogram (EKG or ECG), which takes about 5 to 10 minutes. You may have other EKGs during and after the stress test.  Medicine will be put in your IV. It will make your heart work harder.  You may get a headache and feel dizzy, flushed, and nauseated from the medicine, but these symptoms usually do not last long. Your heartbeat and blood pressure may be checked. Your symptoms such as chest pain and shortness of breath will be checked. A few minutes after you get the medicine, another small amount of radioactive tracer is injected. You may be given something to reverse the medicine used to make your heart work harder. You will wait for 30 to 40 minutes and then have another resting scan. What else should you know about the test?  Sometimes more pictures are taken 2 to 4 hours after the stress scan. No electricity passes through your body during the test. There is no danger of getting an electrical shock. Anytime you're exposed to radiation, there's a small chance of damage to cells or tissue. That's the case even with the low-level radioactive tracer used for this test. But the chance of damage is very low compared with the benefits of the test.  Most of the tracer will leave your body through your urine or stool within a day. So be sure to flush the toilet right after you use it, and wash your hands well with soap and water. The amount of radiation in the tracer is very small. This means it isn't a risk for people to be around you after the test.  What happens after the test?  You will probably be able to go home right away. You can go back to your usual activities right away. When should you call for help? Call 911 anytime you think you may need emergency care. For example, call if:  You passed out (lost consciousness). You have been diagnosed with angina, and you have angina symptoms that do not go away with rest or are not getting better within 5 minutes after you take a dose of nitroglycerin. You have symptoms of a heart attack. These may include:  Chest pain or pressure, or a strange feeling in the chest.  Sweating. Shortness of breath. Nausea or vomiting. Pain, pressure, or a strange feeling in the back, neck, jaw, or upper belly or in one or both shoulders or arms.   Lightheadedness or sudden weakness. A fast or irregular heartbeat. After you call 911, the  may tell you to chew 1 adult-strength or 2 to 4 low-dose aspirin. Wait for an ambulance. Do not try to drive yourself. Watch closely for changes in your health, and be sure to contact your doctor if you have any problems. Follow-up care is a key part of your treatment and safety. Be sure to make and go to all appointments, and call your doctor if you are having problems. It's also a good idea to keep a list of the medicines you take. Ask your doctor when you can expect to have your test results. Where can you learn more? Go to https://ComparisimpeLogicNets.[employer]. org and sign in to your Moqom account. Enter R320 in the eyeQ box to learn more about \"Cardiac Perfusion Scan (Medicine): About This Test.\"     If you do not have an account, please click on the \"Sign Up Now\" link. Current as of: April 29, 2021               Content Version: 13.0  © 2006-2021 [employer]. Care instructions adapted under license by [facility] ([facility]). If you have questions about a medical condition or this instruction, always ask your healthcare professional. [place] [address] any warranty or liability for your use of this information. Cardiac Event Monitoring: About This Test  What is it? A cardiac event monitor is a small device that you wear or keep with you. It records the electrical activity of your heart. It records times when your heartbeat is too fast, too slow, or irregular. These are called cardiac events. The monitor will give your doctor the same kind of information as an electrocardiogram (EKG or ECG). An EKG shows the heart's electrical activity as line tracings on paper. There are different types of monitors. Your doctor will choose the type that works best for you and is most likely to help find your heart problem. Why is this test done?   This test is used to look for irregular heartbeats. It can help your doctor find out what is causing symptoms such as chest pain, fainting, or lightheadedness. It also can help the doctor see if treatment for an abnormal heartbeat is working. Many people have abnormal heartbeats from time to time. Because these kinds of heartbeats can come and go, it may be hard to record one while you are in the doctor's office. Tracking your heartbeat for a longer time and during your whole day makes it easier to record your cardiac events. How is the test done? If you are getting a monitor with electrode pads on your chest:  Several areas on your chest may be shaved and cleaned. Then a small amount of gel will be put on those areas. The electrode pads will then be attached to the skin of your chest. Thin wires will connect the electrodes to the monitor. You will get instructions for how and when to change the electrodes at home. Some types of monitors don't use electrode pads. Some types are worn on your wrist like a watch. Others are stuck to your chest with a sticky patch. Or you may have a monitor that you carry with you. Your doctor will explain which type of monitor you have and how to use it. Some monitors start recording on their own when they detect an abnormal heartbeat. With others, you may have to start the recording when you have symptoms. Your doctor will explain which type of monitor you have and how to use it. How is the monitor used? With some monitors, you may need to do something to record when you have symptoms. You might use a handheld device to start it. Or you may need to press a button on the monitor. You may keep a diary of what you were doing when you had symptoms such as chest pain or dizziness. Your doctor will show you how to do this. You may need to send the information from your monitor to your doctor through a phone line or online. Some monitors send it on their own. You will get instructions from your doctor.  Your information will stay private and secure no matter how it's sent. You may be able to do most of the things you usually do. But try to follow your doctor's instructions for bathing, exercise, and other activities. How long will you have the monitor? You may use the monitor for up to a month or longer. It depends on how long it takes to record irregular heartbeat episodes. It also depends on how long your doctor wants to keep monitoring your heart. What happens after the test?  Ceasar Murry return the monitor to your doctor's office or hospital.  Ceasar Murry meet with your doctor to talk about the information recorded during your test.  Your doctor will check your diary of symptoms. He or she will compare the timing of your activities and symptoms with the recorded heart pattern. Depending on your test results, your doctor may talk with you about other tests or treatment options. Follow-up care is a key part of your treatment and safety. Be sure to make and go to all appointments, and call your doctor if you are having problems. It's also a good idea to keep a list of the medicines you take. Ask your doctor when you can expect to have your test results. Where can you learn more? Go to https://theBenchpeCramster.hiogi. org and sign in to your GuidePal account. Enter O387 in the [employer] box to learn more about \"Cardiac Event Monitoring: About This Test.\"     If you do not have an account, please click on the \"Sign Up Now\" link. Current as of: April 29, 2021               Content Version: 13.0  © 2006-2021 Healthwise, Incorporated. Care instructions adapted under license by [facility] ([facility]). If you have questions about a medical condition or this instruction, always ask your healthcare professional. [place] [address] any warranty or liability for your use of this information. Transthoracic Echocardiogram: About This Test  What is it?      An echocardiogram (also called an echo) uses sound waves to make an image of your heart. A device called a transducer sends sound waves that echo off your heart and back to the transducer. These echoes are turned into moving pictures of your heart that can be seen on a video screen. In a transthoracic echocardiogram (TTE), the transducer is moved across your chest or belly. A TTE is the most common type of echocardiogram.  Why is this test done? This test is done to check your heart health. It's used for many reasons. For example, it may be done to:  Check a heart murmur. Look for the cause of shortness of breath or unexplained chest pain or pressure. Check how well your heart is pumping blood. Check to see how well your heart valves are working. Look for blood clots inside your heart. Measure the size and shape of the heart's chambers. Measure the blood pressure and speed of blood flow through the heart. How is the test done? You may be asked to remove your clothes above your waist. You may be given a cloth or paper covering to use during the test.  You will lie on your back or on your left side on a bed or table. You may receive medicine through a vein (intravenously, or IV). The IV can be used to give you a contrast material. This helps your doctor get good views of your heart. Small pads or patches (electrodes) will be placed on the skin of your chest to record your heart rate during the test.  A small amount of gel will be rubbed on the side of your chest to help  the sound waves. The transducer will be pressed firmly against your chest and moved slowly back and forth. It is usually moved to different areas on your chest or belly to get specific views of your heart. You will be asked to do several things, such as hold very still, breathe in and out very slowly, hold your breath, or lie on your left side. When the test is over, the gel is wiped off and the electrodes are removed.   What are the risks of the test?  There are no known risks from having this test.  No electricity passes through your body during the test. There is no danger of getting an electrical shock. You do not receive any radiation. What happens after the test?  You will probably be able to go home right away. It depends on the reason for the test.  You can go back to your usual activities right away. Follow-up care is a key part of your treatment and safety. Be sure to make and go to all appointments, and call your doctor if you are having problems. It's also a good idea to keep a list of the medicines you take. Ask your doctor when you can expect to have your test results. Current as of: April 29, 2021               Content Version: 13.0  © 2006-2021 [employer]. Care instructions adapted under license by [facility] ([facility]). If you have questions about a medical condition or this instruction, always ask your healthcare professional. [place] [address] any warranty or liability for your use of this information.

## 2022-08-23 ENCOUNTER — HOSPITAL ENCOUNTER (OUTPATIENT)
Dept: WOMENS IMAGING | Age: 42
Discharge: HOME | End: 2022-08-25
Payer: COMMERCIAL

## 2022-08-23 VITALS — HEIGHT: 67 IN | BODY MASS INDEX: 27.41 KG/M2

## 2022-08-23 DIAGNOSIS — Z12.31 ENCOUNTER FOR SCREENING MAMMOGRAM FOR BREAST CANCER: ICD-10-CM

## 2022-08-23 DIAGNOSIS — Z12.31 SCREENING MAMMOGRAM, ENCOUNTER FOR: ICD-10-CM

## 2022-08-23 PROCEDURE — 77067 SCR MAMMO BI INCL CAD: CPT

## 2022-08-28 ENCOUNTER — HOSPITAL ENCOUNTER (EMERGENCY)
Age: 42
Discharge: HOME | End: 2022-08-29
Payer: COMMERCIAL

## 2022-08-28 ENCOUNTER — APPOINTMENT (OUTPATIENT)
Dept: GENERAL RADIOLOGY | Age: 42
End: 2022-08-28
Payer: COMMERCIAL

## 2022-08-28 DIAGNOSIS — R07.9 CHEST PAIN, UNSPECIFIED TYPE: Primary | ICD-10-CM

## 2022-08-28 LAB
BASOPHILS ABSOLUTE: 0.1 K/UL (ref 0–0.2)
BASOPHILS RELATIVE PERCENT: 0.9 %
EOSINOPHILS ABSOLUTE: 0.1 K/UL (ref 0–0.7)
EOSINOPHILS RELATIVE PERCENT: 1 %
HCT VFR BLD CALC: 35 % (ref 37–47)
HEMOGLOBIN: 11.7 G/DL (ref 12–16)
LYMPHOCYTES ABSOLUTE: 1 K/UL (ref 1–4.8)
LYMPHOCYTES RELATIVE PERCENT: 13.2 %
MCH RBC QN AUTO: 32.2 PG (ref 27–31.3)
MCHC RBC AUTO-ENTMCNC: 33.4 % (ref 33–37)
MCV RBC AUTO: 96.4 FL (ref 82–100)
MONOCYTES ABSOLUTE: 0.6 K/UL (ref 0.2–0.8)
MONOCYTES RELATIVE PERCENT: 8.5 %
NEUTROPHILS ABSOLUTE: 5.8 K/UL (ref 1.4–6.5)
NEUTROPHILS RELATIVE PERCENT: 76.4 %
PDW BLD-RTO: 13.1 % (ref 11.5–14.5)
PLATELET # BLD: 246 K/UL (ref 130–400)
RBC # BLD: 3.63 M/UL (ref 4.2–5.4)
WBC # BLD: 7.5 K/UL (ref 4.8–10.8)

## 2022-08-28 PROCEDURE — 99285 EMERGENCY DEPT VISIT HI MDM: CPT

## 2022-08-28 PROCEDURE — 80053 COMPREHEN METABOLIC PANEL: CPT

## 2022-08-28 PROCEDURE — 83735 ASSAY OF MAGNESIUM: CPT

## 2022-08-28 PROCEDURE — 93005 ELECTROCARDIOGRAM TRACING: CPT | Performed by: EMERGENCY MEDICINE

## 2022-08-28 PROCEDURE — 71045 X-RAY EXAM CHEST 1 VIEW: CPT

## 2022-08-28 PROCEDURE — 36415 COLL VENOUS BLD VENIPUNCTURE: CPT

## 2022-08-28 PROCEDURE — 84484 ASSAY OF TROPONIN QUANT: CPT

## 2022-08-28 PROCEDURE — 85025 COMPLETE CBC W/AUTO DIFF WBC: CPT

## 2022-08-28 PROCEDURE — 83690 ASSAY OF LIPASE: CPT

## 2022-08-28 PROCEDURE — 6370000000 HC RX 637 (ALT 250 FOR IP): Performed by: PHYSICIAN ASSISTANT

## 2022-08-28 RX ORDER — NITROGLYCERIN 0.4 MG/1
0.4 TABLET SUBLINGUAL EVERY 5 MIN PRN
Status: DISCONTINUED | OUTPATIENT
Start: 2022-08-28 | End: 2022-08-29 | Stop reason: HOSPADM

## 2022-08-28 RX ADMIN — NITROGLYCERIN 0.4 MG: 0.4 TABLET SUBLINGUAL at 23:48

## 2022-08-28 ASSESSMENT — ENCOUNTER SYMPTOMS
COUGH: 0
TROUBLE SWALLOWING: 0
ALLERGIC/IMMUNOLOGIC NEGATIVE: 1
COLOR CHANGE: 0
EYE PAIN: 0
APNEA: 0
ABDOMINAL PAIN: 0

## 2022-08-28 ASSESSMENT — PAIN DESCRIPTION - DESCRIPTORS
DESCRIPTORS: PRESSURE
DESCRIPTORS: ACHING

## 2022-08-28 ASSESSMENT — PAIN DESCRIPTION - LOCATION
LOCATION: CHEST
LOCATION: CHEST;BREAST

## 2022-08-28 ASSESSMENT — PAIN SCALES - GENERAL
PAINLEVEL_OUTOF10: 8
PAINLEVEL_OUTOF10: 8

## 2022-08-28 ASSESSMENT — PAIN DESCRIPTION - ORIENTATION
ORIENTATION: LEFT
ORIENTATION: LEFT

## 2022-08-28 ASSESSMENT — PAIN DESCRIPTION - PAIN TYPE: TYPE: ACUTE PAIN

## 2022-08-28 ASSESSMENT — PAIN DESCRIPTION - FREQUENCY: FREQUENCY: INTERMITTENT

## 2022-08-28 ASSESSMENT — PAIN - FUNCTIONAL ASSESSMENT: PAIN_FUNCTIONAL_ASSESSMENT: 0-10

## 2022-08-29 VITALS
SYSTOLIC BLOOD PRESSURE: 151 MMHG | DIASTOLIC BLOOD PRESSURE: 82 MMHG | WEIGHT: 175 LBS | OXYGEN SATURATION: 98 % | RESPIRATION RATE: 16 BRPM | HEART RATE: 87 BPM | BODY MASS INDEX: 27.41 KG/M2

## 2022-08-29 LAB
ALBUMIN SERPL-MCNC: 4.2 G/DL (ref 3.5–4.6)
ALP BLD-CCNC: 56 U/L (ref 40–130)
ALT SERPL-CCNC: 20 U/L (ref 0–33)
ANION GAP SERPL CALCULATED.3IONS-SCNC: 11 MEQ/L (ref 9–15)
AST SERPL-CCNC: 21 U/L (ref 0–35)
BILIRUB SERPL-MCNC: 0.3 MG/DL (ref 0.2–0.7)
BUN BLDV-MCNC: 15 MG/DL (ref 6–20)
CALCIUM SERPL-MCNC: 8.6 MG/DL (ref 8.5–9.9)
CHLORIDE BLD-SCNC: 106 MEQ/L (ref 95–107)
CO2: 25 MEQ/L (ref 20–31)
CREAT SERPL-MCNC: 0.51 MG/DL (ref 0.5–0.9)
EKG ATRIAL RATE: 58 BPM
EKG P AXIS: 61 DEGREES
EKG P-R INTERVAL: 138 MS
EKG Q-T INTERVAL: 454 MS
EKG QRS DURATION: 82 MS
EKG QTC CALCULATION (BAZETT): 445 MS
EKG R AXIS: 3 DEGREES
EKG T AXIS: 14 DEGREES
EKG VENTRICULAR RATE: 58 BPM
GFR AFRICAN AMERICAN: >60
GFR NON-AFRICAN AMERICAN: >60
GLOBULIN: 2.9 G/DL (ref 2.3–3.5)
GLUCOSE BLD-MCNC: 90 MG/DL (ref 70–99)
LIPASE: 26 U/L (ref 12–95)
MAGNESIUM: 1.7 MG/DL (ref 1.7–2.4)
POTASSIUM SERPL-SCNC: 3.1 MEQ/L (ref 3.4–4.9)
SODIUM BLD-SCNC: 142 MEQ/L (ref 135–144)
TOTAL PROTEIN: 7.1 G/DL (ref 6.3–8)
TROPONIN: <0.01 NG/ML (ref 0–0.01)
TROPONIN: <0.01 NG/ML (ref 0–0.01)

## 2022-08-29 PROCEDURE — 36415 COLL VENOUS BLD VENIPUNCTURE: CPT

## 2022-08-29 PROCEDURE — 84484 ASSAY OF TROPONIN QUANT: CPT

## 2022-08-29 PROCEDURE — 6370000000 HC RX 637 (ALT 250 FOR IP): Performed by: PHYSICIAN ASSISTANT

## 2022-08-29 PROCEDURE — 93010 ELECTROCARDIOGRAM REPORT: CPT | Performed by: INTERNAL MEDICINE

## 2022-08-29 RX ORDER — POTASSIUM BICARBONATE 25 MEQ/1
50 TABLET, EFFERVESCENT ORAL ONCE
Status: COMPLETED | OUTPATIENT
Start: 2022-08-29 | End: 2022-08-29

## 2022-08-29 RX ADMIN — POTASSIUM BICARBONATE 50 MEQ: 978 TABLET, EFFERVESCENT ORAL at 01:03

## 2022-08-29 RX ADMIN — NITROGLYCERIN 0.4 MG: 0.4 TABLET SUBLINGUAL at 00:01

## 2022-08-29 ASSESSMENT — PAIN DESCRIPTION - DESCRIPTORS: DESCRIPTORS: ACHING

## 2022-08-29 ASSESSMENT — HEART SCORE: ECG: 0

## 2022-08-29 ASSESSMENT — PAIN DESCRIPTION - ORIENTATION: ORIENTATION: LEFT

## 2022-08-29 ASSESSMENT — PAIN SCALES - GENERAL: PAINLEVEL_OUTOF10: 5

## 2022-08-29 ASSESSMENT — PAIN DESCRIPTION - LOCATION: LOCATION: CHEST

## 2022-08-29 NOTE — ED NOTES
EKG completed in triage. Reviewed by Dr. Matthew Sood. No acute findings noted. [facility]       Chong Cardenas RN  08/28/22 [id number]

## 2022-08-29 NOTE — ED PROVIDER NOTES
[address] [facility] ED  EMERGENCY DEPARTMENT ENCOUNTER      Pt Name: Shae Yung  MRN: [id number]  [place] 1980  Date of evaluation: 8/28/2022  Provider: Oscar Maciel PA-C    CHIEF COMPLAINT       Chief Complaint   Patient presents with    Chest Pain     Intermittent x 2 weeks. More frequently since Friday. Hypertension reported. HISTORY OF PRESENT ILLNESS   (Location/Symptom, Timing/Onset, Context/Setting, Quality, Duration, Modifying Factors, Severity)  Note limiting factors. Shae Yung is a 35 y.o. female who presents to the emergency department with complaints of acute on chronic chest pain. Patient admits that this been an ongoing issue that she has been dealing with with pain that goes through the left side of the chest and arm. Patient saw nurse practitioner in cardiology office 1-1/2 weeks ago and was scheduled for further testing in September but states that since Friday the pain has become more intense. Patient has not tried taking anything for the symptoms    HPI    Nursing Notes were reviewed. REVIEW OF SYSTEMS    (2-9 systems for level 4, 10 or more for level 5)     Review of Systems   Constitutional:  Negative for diaphoresis and fever. HENT:  Negative for hearing loss and trouble swallowing. Eyes:  Negative for pain. Respiratory:  Negative for apnea and cough. Cardiovascular:  Positive for chest pain. Gastrointestinal:  Negative for abdominal pain. Endocrine: Negative. Genitourinary:  Negative for hematuria. Musculoskeletal:  Negative for neck pain and neck stiffness. Skin:  Negative for color change. Allergic/Immunologic: Negative. Neurological:  Negative for dizziness and numbness. Hematological: Negative. Psychiatric/Behavioral: Negative. All other systems reviewed and are negative. Except as noted above the remainder of the review of systems was reviewed and negative.        PAST MEDICAL HISTORY     Past Medical History:   Diagnosis Date    Allergic rhinitis     Depressive disorder     Hypothyroidism     Inverted nipple     Migraine     Obesity     NANDO (obstructive sleep apnea)     Osteoarthritis     PTSD (post-traumatic stress disorder)          SURGICAL HISTORY       Past Surgical History:   Procedure Laterality Date    GASTRIC BYPASS SURGERY  2020    HYSTERECTOMY (CERVIX STATUS UNKNOWN)      Partial-still has 1 ovary    JOINT REPLACEMENT Left     OVARY REMOVAL      only removed 1 ovary         CURRENT MEDICATIONS       Discharge Medication List as of 8/29/2022  1:28 AM        CONTINUE these medications which have NOT CHANGED    Details   VITAMIN A PO Take by mouthHistorical Med      Cyanocobalamin (VITAMIN B 12 PO) Inject into the muscle every 30 daysHistorical Med      vitamin D (ERGOCALCIFEROL) 1.25 MG (02849 UT) CAPS capsule Take 50,000 Units by mouth once a weekHistorical Med      acetaminophen (AMINOFEN) 325 MG tablet Take 2 tablets by mouth every 6 hours as needed for Pain, Disp-20 tablet,R-0Print             ALLERGIES     Dye [iodides], Naproxen, and Red dye    FAMILY HISTORY       Family History   Problem Relation Age of Onset    Breast Cancer Sister           SOCIAL HISTORY       Social History     Socioeconomic History    Marital status: Single   Tobacco Use    Smoking status: Never    Smokeless tobacco: Never   Vaping Use    Vaping Use: Never used   Substance and Sexual Activity    Drug use: Never       SCREENINGS        [place] Coma Scale  Eye Opening: Spontaneous  Best Verbal Response: Oriented  Best Motor Response: Obeys commands  [place] Coma Scale Score: 15 Heart Score for chest pain patients  History:  Moderately Suspicious  ECG: Normal  Patient Age: < 39 years  *Risk factors for Atherosclerotic disease: Positive family History  Risk Factors: 1 or 2 risk factors  Troponin: < 1X normal limit  Heart Score Total: 2             PHYSICAL EXAM    (up to 7 for level 4, 8 or more for level 5)     ED Triage Vitals [08/28/22 2250]   BP Temp Temp src Heart Rate Resp SpO2 Height Weight   (!) 172/77 -- -- 62 16 99 % -- 175 lb (79.4 kg)       Physical Exam  Vitals and nursing note reviewed. Constitutional:       General: She is not in acute distress. Appearance: She is well-developed. She is not diaphoretic. HENT:      Head: Normocephalic and atraumatic. Mouth/Throat:      Mouth: Mucous membranes are moist.      Pharynx: No oropharyngeal exudate. Eyes:      General: No scleral icterus. Conjunctiva/sclera: Conjunctivae normal.      Pupils: Pupils are equal, round, and reactive to light. Neck:      Trachea: No tracheal deviation. Cardiovascular:      Rate and Rhythm: Normal rate. Heart sounds: Normal heart sounds. Pulmonary:      Effort: Pulmonary effort is normal. No respiratory distress. Breath sounds: Normal breath sounds. Chest:      Chest wall: Tenderness present. Abdominal:      General: Bowel sounds are normal. There is no distension. Palpations: Abdomen is soft. Musculoskeletal:         General: Normal range of motion. Cervical back: Normal range of motion and neck supple. No rigidity or tenderness. Skin:     General: Skin is warm and dry. Findings: No erythema or rash. Neurological:      Mental Status: She is alert and oriented to person, place, and time. Cranial Nerves: No cranial nerve deficit. Motor: No abnormal muscle tone. Psychiatric:         Behavior: Behavior normal.         Thought Content:  Thought content normal.         Judgment: Judgment normal.       DIAGNOSTIC RESULTS     EKG: All EKG's are interpreted by the Emergency Department Physician who either signs or Co-signs this chart in the absence of a cardiologist.    Sinus bradycardia, rate 58 bpm, no acute ST elevation or ischemic changes    RADIOLOGY:   Non-plain film images such as CT, Ultrasound and MRI are read by the radiologist. Plain radiographic images are visualized and preliminarily interpreted by the emergency physician with the below findings:    NAD    Interpretation per the Radiologist below, if available at the time of this note:    XR CHEST PORTABLE   Final Result   No evidence of acute cardiopulmonary process               ED BEDSIDE ULTRASOUND:   Performed by ED Physician - none    LABS:  Labs Reviewed   COMPREHENSIVE METABOLIC PANEL - Abnormal; Notable for the following components:       Result Value    Potassium 3.1 (*)     All other components within normal limits   CBC WITH AUTO DIFFERENTIAL - Abnormal; Notable for the following components:    RBC 3.63 (*)     Hemoglobin 11.7 (*)     Hematocrit 35.0 (*)     MCH 32.2 (*)     All other components within normal limits   TROPONIN   LIPASE   MAGNESIUM   TROPONIN       All other labs were within normal range or not returned as of this dictation. EMERGENCY DEPARTMENT COURSE and DIFFERENTIAL DIAGNOSIS/MDM:   Vitals:    Vitals:    08/29/22 0030 08/29/22 0100 08/29/22 0130 08/29/22 0200   BP: (!) 143/81 139/75 (!) 150/83 (!) 151/82   Pulse: 51 51 59 87   Resp:       SpO2: 100% 100%  98%   Weight:               MDM        REASSESSMENT      Patient is a 43-year-old female who presents emergency department with complaints of left-sided chest pain that has been ongoing intermittently for the past several weeks but persistent since Friday night. Pain is reproducible to palpation. EKG and first set of cardiac enzymes are unremarkable. Patient will have second troponin checked and if remains negative will be DC home and advised to call cardiology in AM    CONSULTS:  None    PROCEDURES:  Unless otherwise noted below, none     Procedures        FINAL IMPRESSION      1.  Chest pain, unspecified type            DISPOSITION/PLAN   DISPOSITION Decision To Discharge 08/29/2022 02:14:23 AM      PATIENT REFERRED TO:  SHINE Oviedo CNP  [address] [place] [address] [phone]    Call in 1 day      DISCHARGE MEDICATIONS:  Discharge Medication List as of 8/29/2022  1:28 AM        Controlled Substances Monitoring:     No flowsheet data found.     (Please note that portions of this note were completed with a voice recognition program.  Efforts were made to edit the dictations but occasionally words are mis-transcribed.)    Eddie Kaye PA-C (electronically signed)  Attending Emergency Physician            Eddie Kaye PA-C  09/01/22 [id number]

## 2022-08-30 ENCOUNTER — OFFICE VISIT (OUTPATIENT)
Dept: CARDIOLOGY CLINIC | Age: 42
End: 2022-08-30
Payer: COMMERCIAL

## 2022-08-30 VITALS
WEIGHT: 175.2 LBS | BODY MASS INDEX: 27.5 KG/M2 | HEIGHT: 67 IN | DIASTOLIC BLOOD PRESSURE: 72 MMHG | SYSTOLIC BLOOD PRESSURE: 132 MMHG

## 2022-08-30 DIAGNOSIS — Z09 HOSPITAL DISCHARGE FOLLOW-UP: Primary | ICD-10-CM

## 2022-08-30 DIAGNOSIS — R07.9 CHEST PAIN, UNSPECIFIED TYPE: ICD-10-CM

## 2022-08-30 PROCEDURE — 99213 OFFICE O/P EST LOW 20 MIN: CPT | Performed by: NURSE PRACTITIONER

## 2022-08-30 PROCEDURE — 1036F TOBACCO NON-USER: CPT | Performed by: NURSE PRACTITIONER

## 2022-08-30 PROCEDURE — G8419 CALC BMI OUT NRM PARAM NOF/U: HCPCS | Performed by: NURSE PRACTITIONER

## 2022-08-30 PROCEDURE — G8427 DOCREV CUR MEDS BY ELIG CLIN: HCPCS | Performed by: NURSE PRACTITIONER

## 2022-08-30 RX ORDER — NITROGLYCERIN 0.4 MG/1
0.4 TABLET SUBLINGUAL EVERY 5 MIN PRN
Qty: 25 TABLET | Refills: 3 | Status: SHIPPED | OUTPATIENT
Start: 2022-08-30

## 2022-08-30 NOTE — PROGRESS NOTES
Patient: Noreen Price  YOB: 1980  MRN: [id number]    Chief Complaint:   Chief Complaint   Patient presents with    Chest Pain     8/28, felt pains in her chest, still present slightly an annoying feeling. Studies ordered in august are not scheduled until September/October. ED asked if we could get her scheduled sooner          Subjective/HPI   8/19/2022: Patient seen for initial medical evaluation. Patient with past medical history hypothyroidism, obesity, NANDO, PTSD. Patient seen today per her request for bradycardia. States she had an EKG at PCPs office that showed heart rate in the 40s. EKG in office today shows normal sinus rhythm, heart rate in the 60s. Patient also with complaints of intermittent, midsternal chest pain/pressure that radiates to left arm. States that sometimes pain feels like a burning or squeezing. Reports shortness of breath with exertion. Denies any other associated symptoms. Denies aggravating or alleviating factors. Patient also reports intermittent palpitations and feeling like her heart is skipping beats. Pt denies  change in exercise capacity, fatigue,  nausea, vomiting, diarrhea, constipation, motor weakness, insomnia, weight loss, syncope, dizziness, lightheadedness, PND, orthopnea, or claudication. No bleeding issues. Pt denies CHF type symptoms. No recent hospitalizations. Pt is compliant with all Rx medications. Blood pressure and heart rate are under control. 8/30/2022: pt here for follow up visit. Was in the ED on 8/28 with chest pain. States she was sitting in a movie when pain started. Work up at that time was unremarkable, troponins negative x 2, CP was reproducible. Pt was given SL Nitro x 2 with relief of pain. Denies any further episodes of chest pain. Pt states her BP has been running high lately. States she feels her HR going fast sometimes.   Pt is scheduled for stress, echo and event monitor in the next few weeks          Allergies   Allergen Reactions    Dye [Iodides] Anaphylaxis    Naproxen Swelling     Facing swelling bilat eyes    Red Dye Hives       Current Outpatient Medications   Medication Sig Dispense Refill    metoprolol tartrate (LOPRESSOR) 25 MG tablet Take 0.5 tablets by mouth 2 times daily 30 tablet 5    nitroGLYCERIN (NITROSTAT) 0.4 MG SL tablet Place 1 tablet under the tongue every 5 minutes as needed for Chest pain 25 tablet 3    VITAMIN A PO Take by mouth      Cyanocobalamin (VITAMIN B 12 PO) Inject into the muscle every 30 days      vitamin D (ERGOCALCIFEROL) 1.25 MG (50117 UT) CAPS capsule Take 50,000 Units by mouth once a week      acetaminophen (AMINOFEN) 325 MG tablet Take 2 tablets by mouth every 6 hours as needed for Pain 20 tablet 0     No current facility-administered medications for this visit.        Past Medical History:   Diagnosis Date    Allergic rhinitis     Depressive disorder     Hypothyroidism     Inverted nipple     Migraine     Obesity     NANDO (obstructive sleep apnea)     Osteoarthritis     PTSD (post-traumatic stress disorder)        Past Surgical History:   Procedure Laterality Date    GASTRIC BYPASS SURGERY  2020    HYSTERECTOMY (CERVIX STATUS UNKNOWN)      Partial-still has 1 ovary    JOINT REPLACEMENT Left     OVARY REMOVAL      only removed 1 ovary       Social History     Socioeconomic History    Marital status: Single     Spouse name: None    Number of children: None    Years of education: None    Highest education level: None   Tobacco Use    Smoking status: Never    Smokeless tobacco: Never   Vaping Use    Vaping Use: Never used   Substance and Sexual Activity    Drug use: Never       Family History   Problem Relation Age of Onset    Breast Cancer Sister          Review of Systems:   Review of Systems      Physical Examination:    /72 (Site: Left Upper Arm, Position: Sitting, Cuff Size: Medium Adult)   Ht 5' 7\" (1.702 m)   Wt 175 lb 3.2 oz (79.5 kg)   BMI 27.44 kg/m²    Physical Exam    LABS:  CBC:   Lab Results   Component Value Date/Time    WBC 7.5 08/28/2022 11:15 PM    RBC 3.63 08/28/2022 11:15 PM    HGB 11.7 08/28/2022 11:15 PM    HCT 35.0 08/28/2022 11:15 PM    MCV 96.4 08/28/2022 11:15 PM    MCH 32.2 08/28/2022 11:15 PM    MCHC 33.4 08/28/2022 11:15 PM    RDW 13.1 08/28/2022 11:15 PM     08/28/2022 11:15 PM    MPV 9.5 03/27/2013 03:12 PM     Lipids:  Lab Results   Component Value Date    CHOL 134 07/29/2022     Lab Results   Component Value Date    TRIG 49 07/29/2022     Lab Results   Component Value Date    HDL 75 (H) 07/29/2022     Lab Results   Component Value Date    LDLCALC 49 07/29/2022     No results found for: LABVLDL, VLDL  No results found for: CHOLHDLRATIO  CMP:    Lab Results   Component Value Date/Time     08/28/2022 11:15 PM    K 3.1 08/28/2022 11:15 PM     08/28/2022 11:15 PM    CO2 25 08/28/2022 11:15 PM    BUN 15 08/28/2022 11:15 PM    CREATININE 0.51 08/28/2022 11:15 PM    GFRAA >60.0 08/28/2022 11:15 PM    LABGLOM >60.0 08/28/2022 11:15 PM    GLUCOSE 90 08/28/2022 11:15 PM    PROT 7.1 08/28/2022 11:15 PM    LABALBU 4.2 08/28/2022 11:15 PM    CALCIUM 8.6 08/28/2022 11:15 PM    BILITOT 0.3 08/28/2022 11:15 PM    ALKPHOS 56 08/28/2022 11:15 PM    AST 21 08/28/2022 11:15 PM    ALT 20 08/28/2022 11:15 PM     BMP:    Lab Results   Component Value Date/Time     08/28/2022 11:15 PM    K 3.1 08/28/2022 11:15 PM     08/28/2022 11:15 PM    CO2 25 08/28/2022 11:15 PM    BUN 15 08/28/2022 11:15 PM    LABALBU 4.2 08/28/2022 11:15 PM    CREATININE 0.51 08/28/2022 11:15 PM    CALCIUM 8.6 08/28/2022 11:15 PM    GFRAA >60.0 08/28/2022 11:15 PM    LABGLOM >60.0 08/28/2022 11:15 PM    GLUCOSE 90 08/28/2022 11:15 PM     Magnesium:    Lab Results   Component Value Date/Time    MG 1.7 08/28/2022 11:15 PM     TSH:  Lab Results   Component Value Date    TSH 2.160 07/29/2022     . result  No results for input(s): PROBNP in the last 72 hours. No results for input(s): INR in the last 72 hours. Patient Active Problem List   Diagnosis    PTSD (post-traumatic stress disorder)    NANDO (obstructive sleep apnea)    Morbid obesity (HCC)    Hypothyroidism    Chest pain       Assessment   Diagnosis Orders   1. Hospital discharge follow-up        2. Chest pain, unspecified type            No orders of the defined types were placed in this encounter. Orders Placed This Encounter   Medications    metoprolol tartrate (LOPRESSOR) 25 MG tablet     Sig: Take 0.5 tablets by mouth 2 times daily     Dispense:  30 tablet     Refill:  5    nitroGLYCERIN (NITROSTAT) 0.4 MG SL tablet     Sig: Place 1 tablet under the tongue every 5 minutes as needed for Chest pain     Dispense:  25 tablet     Refill:  3          No follow-ups on file. Plan  Follow up with PCP for labs and NANDO. 1 week event monitor    Non invasive cardiac testing will be ordered to further evaluate for any ischemic or structural heart disease as a cause of the patient symptoms. We will proceed with a Cardiolite stress test and echo     Nitro SL PRN    Metoprolol 12.5mg BID for BP and HR    We will need to continue to monitor muscle and liver enzymes, BUN, CR, and electrolytes. The nature of cardiac risk has been fully discussed with this patient. I have made him aware of his LDL target goal given his cardiovascular risk analysis. I have discussed the appropriate diet. The need for lifelong compliance in order to reduce risk is stressed. A regular exercise program is recommended to help achieve and maintain normal body weight, fitness and improve lipid balance. Details of medical condition explained and patient was warned about adverse consequences of uncontrolled medical conditions and possible side effects of prescribed medications.      Patient was advised and encouraged to check blood pressure at home or at a pharmacy, maintain a logbook, and also call us back if blood pressures are above or below the target ranges. Patient clearly understands and agrees to these instructions. Counseling: The patient has been advised to contact us if they experience chest pain, palpitations, progressive SOB, orthopnea, paroxysmal nocturnal dyspnea, or progressive edema.

## 2022-09-19 ENCOUNTER — TELEPHONE (OUTPATIENT)
Dept: WOMENS IMAGING | Age: 42
End: 2022-09-19

## 2022-09-19 NOTE — TELEPHONE ENCOUNTER
09-  Message left for patient to call back regarding scheduling her high risk appointment that she requested on her form when she came for mammogram.  Will try calling patient again.

## 2022-09-28 ENCOUNTER — HOSPITAL ENCOUNTER (OUTPATIENT)
Dept: NON INVASIVE DIAGNOSTICS | Age: 42
Discharge: HOME | End: 2022-09-28
Payer: COMMERCIAL

## 2022-09-28 ENCOUNTER — HOSPITAL ENCOUNTER (OUTPATIENT)
Dept: NUCLEAR MEDICINE | Age: 42
Discharge: HOME | End: 2022-09-30
Payer: COMMERCIAL

## 2022-09-28 DIAGNOSIS — R07.9 CHEST PAIN, UNSPECIFIED TYPE: ICD-10-CM

## 2022-09-28 DIAGNOSIS — R00.1 BRADYCARDIA: ICD-10-CM

## 2022-09-28 LAB
LV EF: 61 %
LVEF MODALITY: NORMAL

## 2022-09-28 PROCEDURE — 93270 REMOTE 30 DAY ECG REV/REPORT: CPT

## 2022-09-28 PROCEDURE — 6360000002 HC RX W HCPCS: Performed by: NURSE PRACTITIONER

## 2022-09-28 PROCEDURE — 93017 CV STRESS TEST TRACING ONLY: CPT

## 2022-09-28 PROCEDURE — A9502 TC99M TETROFOSMIN: HCPCS | Performed by: NURSE PRACTITIONER

## 2022-09-28 PROCEDURE — 3430000000 HC RX DIAGNOSTIC RADIOPHARMACEUTICAL: Performed by: NURSE PRACTITIONER

## 2022-09-28 PROCEDURE — 93018 CV STRESS TEST I&R ONLY: CPT | Performed by: INTERNAL MEDICINE

## 2022-09-28 PROCEDURE — 2580000003 HC RX 258: Performed by: NURSE PRACTITIONER

## 2022-09-28 PROCEDURE — 78452 HT MUSCLE IMAGE SPECT MULT: CPT

## 2022-09-28 RX ORDER — SODIUM CHLORIDE 0.9 % (FLUSH) 0.9 %
10 SYRINGE (ML) INJECTION PRN
Status: COMPLETED | OUTPATIENT
Start: 2022-09-28 | End: 2022-09-28

## 2022-09-28 RX ADMIN — Medication 10 ML: at 11:37

## 2022-09-28 RX ADMIN — TETROFOSMIN 35 MILLICURIE: 1.38 INJECTION, POWDER, LYOPHILIZED, FOR SOLUTION INTRAVENOUS at 11:37

## 2022-09-28 RX ADMIN — TETROFOSMIN 11.8 MILLICURIE: 1.38 INJECTION, POWDER, LYOPHILIZED, FOR SOLUTION INTRAVENOUS at 10:00

## 2022-09-28 RX ADMIN — Medication 10 ML: at 11:38

## 2022-09-28 RX ADMIN — Medication 10 ML: at 10:00

## 2022-09-28 RX ADMIN — REGADENOSON 0.4 MG: 0.08 INJECTION, SOLUTION INTRAVENOUS at 11:37

## 2022-09-28 NOTE — PROGRESS NOTES
Reviewed history, allergies, and medications. Patients Bp too high for treadmill testing. Patient held her home medications prior to testing. Consent confirmed. Lexiscan exam explained. Placed patient on monitor. @ 66 02 67 Nuclear Medicine tech here to inject Bhavin Esposito. SOB noted during recovery phase. Denied chest pain. No ectopy noted. Doctor to further review and interpret results. Patient off monitor and instructed to eat, will have last part of exam in 1 hour.

## 2022-10-12 ENCOUNTER — HOSPITAL ENCOUNTER (OUTPATIENT)
Dept: NON INVASIVE DIAGNOSTICS | Age: 42
Discharge: HOME | End: 2022-10-12
Payer: COMMERCIAL

## 2022-10-12 DIAGNOSIS — R07.9 CHEST PAIN, UNSPECIFIED TYPE: ICD-10-CM

## 2022-10-12 DIAGNOSIS — R00.1 BRADYCARDIA: ICD-10-CM

## 2022-10-12 LAB
LV EF: 53 %
LVEF MODALITY: NORMAL

## 2022-10-12 PROCEDURE — 93306 TTE W/DOPPLER COMPLETE: CPT

## 2022-10-20 ENCOUNTER — OFFICE VISIT (OUTPATIENT)
Dept: CARDIOLOGY CLINIC | Age: 42
End: 2022-10-20
Payer: COMMERCIAL

## 2022-10-20 VITALS
BODY MASS INDEX: 27.88 KG/M2 | DIASTOLIC BLOOD PRESSURE: 90 MMHG | SYSTOLIC BLOOD PRESSURE: 150 MMHG | WEIGHT: 178 LBS | HEART RATE: 68 BPM

## 2022-10-20 DIAGNOSIS — R07.2 PRECORDIAL PAIN: ICD-10-CM

## 2022-10-20 PROBLEM — R07.9 CHEST PAIN: Status: RESOLVED | Noted: 2022-08-30 | Resolved: 2022-10-20

## 2022-10-20 PROCEDURE — G8484 FLU IMMUNIZE NO ADMIN: HCPCS | Performed by: INTERNAL MEDICINE

## 2022-10-20 PROCEDURE — G8419 CALC BMI OUT NRM PARAM NOF/U: HCPCS | Performed by: INTERNAL MEDICINE

## 2022-10-20 PROCEDURE — G8427 DOCREV CUR MEDS BY ELIG CLIN: HCPCS | Performed by: INTERNAL MEDICINE

## 2022-10-20 PROCEDURE — 1036F TOBACCO NON-USER: CPT | Performed by: INTERNAL MEDICINE

## 2022-10-20 PROCEDURE — 99214 OFFICE O/P EST MOD 30 MIN: CPT | Performed by: INTERNAL MEDICINE

## 2022-10-20 NOTE — PROGRESS NOTES
Patient: Yeison Rivera  YOB: 1980  MRN: [id number]    Chief Complaint:   Chief Complaint   Patient presents with    Results     STRESS TEST, ECHOCARDIOGRAM AND CARDIAC EVENT MONITOR         Subjective/HPI   8/19/2022: Patient seen for initial medical evaluation. Patient with past medical history hypothyroidism, obesity, NANDO, PTSD. Patient seen today per her request for bradycardia. States she had an EKG at PCPs office that showed heart rate in the 40s. EKG in office today shows normal sinus rhythm, heart rate in the 60s. Patient also with complaints of intermittent, midsternal chest pain/pressure that radiates to left arm. States that sometimes pain feels like a burning or squeezing. Reports shortness of breath with exertion. Denies any other associated symptoms. Denies aggravating or alleviating factors. Patient also reports intermittent palpitations and feeling like her heart is skipping beats. Pt denies  change in exercise capacity, fatigue,  nausea, vomiting, diarrhea, constipation, motor weakness, insomnia, weight loss, syncope, dizziness, lightheadedness, PND, orthopnea, or claudication. No bleeding issues. Pt denies CHF type symptoms. No recent hospitalizations. Pt is compliant with all Rx medications. Blood pressure and heart rate are under control. 8/30/2022: pt here for follow up visit. Was in the ED on 8/28 with chest pain. States she was sitting in a movie when pain started. Work up at that time was unremarkable, troponins negative x 2, CP was reproducible. Pt was given SL Nitro x 2 with relief of pain. Denies any further episodes of chest pain. Pt states her BP has been running high lately. States she feels her HR going fast sometimes. Pt is scheduled for stress, echo and event monitor in the next few weeks      10-20-22: Status post echocardiogram in October 12, 2022 with normal LV function ejection fraction of 55% no valve abnormality.   Status post nuclear stress test with normal perfusion no evidence of any ischemia normal LV function  Patient feels sometimes that her heart skips a beat or slow. She states she has not taken any medications. Status post 1 week event monitor in September 2022 with essentially normal event monitor with no episodes of any malignant tachycardia or bradycardia arrhythmias. The slowest heartbeat was 40 bpm at 7 AM  Patient with history of NANDO that resolved status post bariatric      Allergies   Allergen Reactions    Dye [Iodides] Anaphylaxis    Naproxen Swelling     Facing swelling bilat eyes    Red Dye Hives       Current Outpatient Medications   Medication Sig Dispense Refill    nitroGLYCERIN (NITROSTAT) 0.4 MG SL tablet Place 1 tablet under the tongue every 5 minutes as needed for Chest pain (Patient not taking: Reported on 10/20/2022) 25 tablet 3    VITAMIN A PO Take by mouth (Patient not taking: Reported on 10/20/2022)      Cyanocobalamin (VITAMIN B 12 PO) Inject into the muscle every 30 days (Patient not taking: Reported on 10/20/2022)      vitamin D (ERGOCALCIFEROL) 1.25 MG (87685 UT) CAPS capsule Take 50,000 Units by mouth once a week (Patient not taking: Reported on 10/20/2022)       No current facility-administered medications for this visit.        Past Medical History:   Diagnosis Date    Allergic rhinitis     Depressive disorder     Hypothyroidism     Inverted nipple     Migraine     Obesity     NANDO (obstructive sleep apnea)     Osteoarthritis     Precordial pain 10/20/2022    PTSD (post-traumatic stress disorder)        Past Surgical History:   Procedure Laterality Date    GASTRIC BYPASS SURGERY  2020    HYSTERECTOMY (CERVIX STATUS UNKNOWN)      Partial-still has 1 ovary    JOINT REPLACEMENT Left     OVARY REMOVAL      only removed 1 ovary       Social History     Socioeconomic History    Marital status: Single   Tobacco Use    Smoking status: Never    Smokeless tobacco: Never   Vaping Use    Vaping Use: Never used PM    GFRAA >60.0 08/28/2022 11:15 PM    LABGLOM >60.0 08/28/2022 11:15 PM    GLUCOSE 90 08/28/2022 11:15 PM     Magnesium:    Lab Results   Component Value Date/Time    MG 1.7 08/28/2022 11:15 PM     TSH:  Lab Results   Component Value Date    TSH 2.160 07/29/2022     . result  No results for input(s): PROBNP in the last 72 hours. No results for input(s): INR in the last 72 hours. Patient Active Problem List   Diagnosis    PTSD (post-traumatic stress disorder)    NANDO (obstructive sleep apnea)    Morbid obesity (HCC)    Hypothyroidism    Precordial pain       Assessment   Diagnosis Orders   1. Precordial pain        2. Bradycardia. No orders of the defined types were placed in this encounter. No orders of the defined types were placed in this encounter. Return in about 1 year (around 10/20/2023), or if symptoms worsen or fail to improve. Plan      Follow up with PCP for labs     Nitro SL PRn    We will need to continue to monitor muscle and liver enzymes, BUN, CR, and electrolytes. The nature of cardiac risk has been fully discussed with this patient. I have made him aware of his LDL target goal given his cardiovascular risk analysis. I have discussed the appropriate diet. The need for lifelong compliance in order to reduce risk is stressed. A regular exercise program is recommended to help achieve and maintain normal body weight, fitness and improve lipid balance. Details of medical condition explained and patient was warned about adverse consequences of uncontrolled medical conditions and possible side effects of prescribed medications. Patient was advised and encouraged to check blood pressure at home or at a pharmacy, maintain a logbook, and also call us back if blood pressures are above or below the target ranges. Patient clearly understands and agrees to these instructions. Counseling: The patient has been advised to contact us if they experience chest pain, palpitations, progressive SOB, orthopnea, paroxysmal nocturnal dyspnea, or progressive edema.

## 2023-04-20 NOTE — TELEPHONE ENCOUNTER
Requesting medication refill. Please approve or deny this request.    Rx requested:  Requested Prescriptions     Pending Prescriptions Disp Refills    nitroGLYCERIN (NITROSTAT) 0.4 MG SL tablet [Pharmacy Med Name: NITROGLYCERIN 0.4 MG SUBL 0.4 Tablet] 25 tablet 3     Sig: PLACE 1 TABLET UNDER THE TONGUE EVERY 5 MINUTES AS NEEDED FOR CHEST PAIN         Last Office Visit:   8/30/2022      Next Visit Date:  Future Appointments   Date Time Provider Otoniel Lemons   10/19/2023  2:30 PM Eduardo Naranjo,  [facility]               Please approve or deny.

## 2023-04-21 RX ORDER — NITROGLYCERIN 0.4 MG/1
0.4 TABLET SUBLINGUAL EVERY 5 MIN PRN
Qty: 25 TABLET | Refills: 3 | Status: SHIPPED | OUTPATIENT
Start: 2023-04-21

## 2023-05-01 ENCOUNTER — HOSPITAL ENCOUNTER (EMERGENCY)
Age: 43
Discharge: HOME | End: 2023-05-01
Payer: COMMERCIAL

## 2023-05-01 ENCOUNTER — APPOINTMENT (OUTPATIENT)
Dept: GENERAL RADIOLOGY | Age: 43
End: 2023-05-01
Payer: COMMERCIAL

## 2023-05-01 VITALS
BODY MASS INDEX: 27.94 KG/M2 | WEIGHT: 178 LBS | RESPIRATION RATE: 17 BRPM | HEART RATE: 62 BPM | TEMPERATURE: 98.1 F | DIASTOLIC BLOOD PRESSURE: 77 MMHG | SYSTOLIC BLOOD PRESSURE: 136 MMHG | OXYGEN SATURATION: 100 % | HEIGHT: 67 IN

## 2023-05-01 DIAGNOSIS — N39.0 URINARY TRACT INFECTION WITHOUT HEMATURIA, SITE UNSPECIFIED: ICD-10-CM

## 2023-05-01 DIAGNOSIS — R11.0 NAUSEA: ICD-10-CM

## 2023-05-01 DIAGNOSIS — R06.02 SHORTNESS OF BREATH: Primary | ICD-10-CM

## 2023-05-01 DIAGNOSIS — R03.0 ELEVATED BLOOD PRESSURE READING: ICD-10-CM

## 2023-05-01 LAB
ALBUMIN SERPL-MCNC: 4.3 G/DL (ref 3.5–4.6)
ALP SERPL-CCNC: 54 U/L (ref 40–130)
ALT SERPL-CCNC: 22 U/L (ref 0–33)
ANION GAP SERPL CALCULATED.3IONS-SCNC: 13 MEQ/L (ref 9–15)
AST SERPL-CCNC: 30 U/L (ref 0–35)
BACTERIA URNS QL MICRO: ABNORMAL /HPF
BASOPHILS # BLD: 0.1 K/UL (ref 0–0.2)
BASOPHILS NFR BLD: 0.9 %
BILIRUB SERPL-MCNC: 0.4 MG/DL (ref 0.2–0.7)
BILIRUB UR QL STRIP: NEGATIVE
BUN SERPL-MCNC: 14 MG/DL (ref 6–20)
CALCIUM SERPL-MCNC: 8.9 MG/DL (ref 8.5–9.9)
CHLORIDE SERPL-SCNC: 103 MEQ/L (ref 95–107)
CLARITY UR: CLEAR
CO2 SERPL-SCNC: 22 MEQ/L (ref 20–31)
COLOR UR: YELLOW
CREAT SERPL-MCNC: 0.64 MG/DL (ref 0.5–0.9)
D DIMER PPP FEU-MCNC: 0.34 MG/L FEU (ref 0–0.5)
EOSINOPHIL # BLD: 0.1 K/UL (ref 0–0.7)
EOSINOPHIL NFR BLD: 1.1 %
EPI CELLS #/AREA URNS AUTO: ABNORMAL /HPF (ref 0–5)
ERYTHROCYTE [DISTWIDTH] IN BLOOD BY AUTOMATED COUNT: 13.6 % (ref 11.5–14.5)
GLOBULIN SER CALC-MCNC: 3.4 G/DL (ref 2.3–3.5)
GLUCOSE SERPL-MCNC: 92 MG/DL (ref 70–99)
GLUCOSE UR STRIP-MCNC: NEGATIVE MG/DL
HCT VFR BLD AUTO: 36.7 % (ref 37–47)
HGB BLD-MCNC: 12.4 G/DL (ref 12–16)
HGB UR QL STRIP: NEGATIVE
HYALINE CASTS #/AREA URNS AUTO: ABNORMAL /HPF (ref 0–5)
KETONES UR STRIP-MCNC: NEGATIVE MG/DL
LEUKOCYTE ESTERASE UR QL STRIP: NEGATIVE
LYMPHOCYTES # BLD: 1.8 K/UL (ref 1–4.8)
LYMPHOCYTES NFR BLD: 28 %
MAGNESIUM SERPL-MCNC: 2 MG/DL (ref 1.7–2.4)
MCH RBC QN AUTO: 31.3 PG (ref 27–31.3)
MCHC RBC AUTO-ENTMCNC: 33.7 % (ref 33–37)
MCV RBC AUTO: 92.7 FL (ref 79.4–94.8)
MONOCYTES # BLD: 0.4 K/UL (ref 0.2–0.8)
MONOCYTES NFR BLD: 6.9 %
NEUTROPHILS # BLD: 4 K/UL (ref 1.4–6.5)
NEUTS SEG NFR BLD: 63.1 %
NITRITE UR QL STRIP: POSITIVE
PH UR STRIP: 7.5 [PH] (ref 5–9)
PLATELET # BLD AUTO: 284 K/UL (ref 130–400)
POTASSIUM SERPL-SCNC: 3.4 MEQ/L (ref 3.4–4.9)
PROT SERPL-MCNC: 7.7 G/DL (ref 6.3–8)
PROT UR STRIP-MCNC: NEGATIVE MG/DL
RBC # BLD AUTO: 3.96 M/UL (ref 4.2–5.4)
RBC #/AREA URNS HPF: ABNORMAL /HPF (ref 0–2)
SODIUM SERPL-SCNC: 138 MEQ/L (ref 135–144)
SP GR UR STRIP: 1.02 (ref 1–1.03)
TROPONIN T SERPL-MCNC: <0.01 NG/ML (ref 0–0.01)
TSH SERPL-MCNC: 3.2 UIU/ML (ref 0.44–3.86)
UROBILINOGEN UR STRIP-ACNC: 1 E.U./DL
WBC # BLD AUTO: 6.4 K/UL (ref 4.8–10.8)
WBC #/AREA URNS AUTO: ABNORMAL /HPF (ref 0–5)

## 2023-05-01 PROCEDURE — 85379 FIBRIN DEGRADATION QUANT: CPT

## 2023-05-01 PROCEDURE — 93005 ELECTROCARDIOGRAM TRACING: CPT | Performed by: PHYSICIAN ASSISTANT

## 2023-05-01 PROCEDURE — 99285 EMERGENCY DEPT VISIT HI MDM: CPT

## 2023-05-01 PROCEDURE — 70360 X-RAY EXAM OF NECK: CPT

## 2023-05-01 PROCEDURE — 85025 COMPLETE CBC W/AUTO DIFF WBC: CPT

## 2023-05-01 PROCEDURE — 2580000003 HC RX 258: Performed by: PHYSICIAN ASSISTANT

## 2023-05-01 PROCEDURE — 81001 URINALYSIS AUTO W/SCOPE: CPT

## 2023-05-01 PROCEDURE — 6360000002 HC RX W HCPCS: Performed by: PHYSICIAN ASSISTANT

## 2023-05-01 PROCEDURE — 96374 THER/PROPH/DIAG INJ IV PUSH: CPT

## 2023-05-01 PROCEDURE — 71046 X-RAY EXAM CHEST 2 VIEWS: CPT

## 2023-05-01 PROCEDURE — 83735 ASSAY OF MAGNESIUM: CPT

## 2023-05-01 PROCEDURE — 84484 ASSAY OF TROPONIN QUANT: CPT

## 2023-05-01 PROCEDURE — 36415 COLL VENOUS BLD VENIPUNCTURE: CPT

## 2023-05-01 PROCEDURE — 84443 ASSAY THYROID STIM HORMONE: CPT

## 2023-05-01 PROCEDURE — 80053 COMPREHEN METABOLIC PANEL: CPT

## 2023-05-01 RX ORDER — 0.9 % SODIUM CHLORIDE 0.9 %
1000 INTRAVENOUS SOLUTION INTRAVENOUS ONCE
Status: COMPLETED | OUTPATIENT
Start: 2023-05-01 | End: 2023-05-01

## 2023-05-01 RX ORDER — LORAZEPAM 2 MG/ML
1 INJECTION INTRAMUSCULAR ONCE
Status: COMPLETED | OUTPATIENT
Start: 2023-05-01 | End: 2023-05-01

## 2023-05-01 RX ORDER — SULFAMETHOXAZOLE AND TRIMETHOPRIM 800; 160 MG/1; MG/1
1 TABLET ORAL 2 TIMES DAILY
Qty: 14 TABLET | Refills: 0 | Status: SHIPPED | OUTPATIENT
Start: 2023-05-01 | End: 2023-05-08

## 2023-05-01 RX ADMIN — LORAZEPAM 1 MG: 2 INJECTION INTRAMUSCULAR; INTRAVENOUS at 15:34

## 2023-05-01 RX ADMIN — SODIUM CHLORIDE 1000 ML: 9 INJECTION, SOLUTION INTRAVENOUS at 17:26

## 2023-05-01 ASSESSMENT — ENCOUNTER SYMPTOMS
BACK PAIN: 0
NAUSEA: 1
APNEA: 0
BLOOD IN STOOL: 0
TROUBLE SWALLOWING: 1
SORE THROAT: 1
ABDOMINAL DISTENTION: 0
ABDOMINAL PAIN: 0
VOMITING: 0
VOICE CHANGE: 0
ANAL BLEEDING: 0
EYE DISCHARGE: 0

## 2023-05-01 ASSESSMENT — PAIN - FUNCTIONAL ASSESSMENT
PAIN_FUNCTIONAL_ASSESSMENT: NONE - DENIES PAIN
PAIN_FUNCTIONAL_ASSESSMENT: 0-10

## 2023-05-01 ASSESSMENT — PAIN DESCRIPTION - PAIN TYPE: TYPE: ACUTE PAIN

## 2023-05-01 ASSESSMENT — PAIN DESCRIPTION - LOCATION: LOCATION: CHEST

## 2023-05-01 ASSESSMENT — PAIN DESCRIPTION - ONSET: ONSET: ON-GOING

## 2023-05-01 ASSESSMENT — PAIN DESCRIPTION - DESCRIPTORS: DESCRIPTORS: PRESSURE

## 2023-05-01 ASSESSMENT — PAIN DESCRIPTION - ORIENTATION: ORIENTATION: MID

## 2023-05-01 ASSESSMENT — PAIN SCALES - GENERAL: PAINLEVEL_OUTOF10: 5

## 2023-05-01 NOTE — ED TRIAGE NOTES
Pt c/o SOB, CP and something stuck in her throat x2 weeks. pt states \"tremble\" started today. Pt is A&OX4, skin intact, afebrile, VSS, breaths are equal and unlabored.

## 2023-05-01 NOTE — DISCHARGE INSTRUCTIONS
Follow up with primary care and cardiology. Monitor blood pressure twice per day. Return to ER if any symptoms worsen or new symptoms develop.

## 2023-05-01 NOTE — CONSULTS
Session ID: [id number]  Request OW:[id number]  Language:[place]  Status:Fulfilled  Agent JG:#[id number]  Agent Name:Leslie

## 2023-05-01 NOTE — ED PROVIDER NOTES
MEDICATIONS:  Discharge Medication List as of 5/1/2023  7:00 PM        START taking these medications    Details   sulfamethoxazole-trimethoprim (BACTRIM DS) 800-160 MG per tablet Take 1 tablet by mouth 2 times daily for 7 days, Disp-14 tablet, R-0Normal                (Please note that portions of this note were completed with a voice recognition program.  Efforts were made to edit the dictations but occasionally words are mis-transcribed.)    Ethan Darling PA-C (electronically signed)  Attending Emergency Physician        Ethan Darling PA-C  05/02/23 0110

## 2023-05-01 NOTE — CONSULTS
Session ID: [id number]  Request ND:[id number]  Language:Citizen of [place]  Status:Fulfilled  Agent JQ:#[id number]  Agent Name:Anjel

## 2023-05-02 ENCOUNTER — TELEPHONE (OUTPATIENT)
Dept: CARDIOLOGY CLINIC | Age: 43
End: 2023-05-02

## 2023-05-02 NOTE — TELEPHONE ENCOUNTER
Appointment For: Matthew Simon ([id number])   Visit Type: [facility]  ([id number])      6/15/2023    2:00 PM  15 mins. DO Tana Flannery 93      Patient Comments:   follow up with my health     Appointment Scheduled  (Newest Message First)  Massimo Huggins 2 hours ago (8:46 AM)     PM  Appointment Information:      Visit Type: Follow Up Appointment          Date: 6/15/2023                  Dept: [facility] Cardiology                  Provider: Alexandru Huff                  Time: 2:00 PM                  Length: 15 min     Appt Status: Scheduled        Appt Instructions:      Please arrive 15 minutes prior to appointment time, bring insurance card, photo   ID and copay. Matthew Simon  Patient Appointment Schedule Request Pool 2 hours ago (8:46 AM)       Appointment For: Matthew Simon ([id number])  Visit Type: [facility]  ([id number])     6/15/2023    2:00 PM  15 mins.   DO Tana Flannery 93     Patient Comments:  follow up with my health

## 2023-05-03 ENCOUNTER — HOSPITAL ENCOUNTER (EMERGENCY)
Age: 43
Discharge: HOME | End: 2023-05-04
Attending: EMERGENCY MEDICINE
Payer: COMMERCIAL

## 2023-05-03 DIAGNOSIS — R07.9 CHEST PAIN, UNSPECIFIED TYPE: Primary | ICD-10-CM

## 2023-05-03 LAB
EKG ATRIAL RATE: 91 BPM
EKG P AXIS: 118 DEGREES
EKG P-R INTERVAL: 134 MS
EKG Q-T INTERVAL: 376 MS
EKG QRS DURATION: 78 MS
EKG QTC CALCULATION (BAZETT): 462 MS
EKG R AXIS: 2 DEGREES
EKG T AXIS: 46 DEGREES
EKG VENTRICULAR RATE: 91 BPM

## 2023-05-03 PROCEDURE — 96375 TX/PRO/DX INJ NEW DRUG ADDON: CPT

## 2023-05-03 PROCEDURE — 99284 EMERGENCY DEPT VISIT MOD MDM: CPT

## 2023-05-03 PROCEDURE — 93005 ELECTROCARDIOGRAM TRACING: CPT | Performed by: EMERGENCY MEDICINE

## 2023-05-03 PROCEDURE — 93010 ELECTROCARDIOGRAM REPORT: CPT | Performed by: INTERNAL MEDICINE

## 2023-05-03 PROCEDURE — 96374 THER/PROPH/DIAG INJ IV PUSH: CPT

## 2023-05-03 NOTE — TELEPHONE ENCOUNTER
Appointment For: Darinel Alexander ([id number])   Visit Type: [facility] OFFICE VISIT ([id number])      5/8/2023     9:15 AM  15 mins. Dominick Mccord CARDIOLOGY      Patient Comments:   Im having problems with high blood pressure and shortness of breath,     Appointment Scheduled  (Newest Message First)  Darinel Alexander  Patient Appointment Schedule Request Pool 7 minutes ago (12:17 PM)       Appointment For: Darinel Alexander ([id number])  Visit Type: MYCHART OFFICE VISIT ([id number])     5/8/2023     9:15 AM  15 mins. Dominick Mccord CARDIOLOGY     Patient Comments:  Im having problems with high blood pressure and shortness of breath,       Mychart, Processor  Christina Olivier 8 minutes ago (12:17 PM)     PM  Appointment Information:      Visit Type: Office Visit          Date: 5/8/2023                  Dept: [facility] Cardiology                  Provider: Rocco Gomez                  Time: 9:15 AM                  Length: 15 min     Appt Status: Scheduled          This MyChart message has not been read.

## 2023-05-03 NOTE — TELEPHONE ENCOUNTER
Appointment canceled for Mary Beth Codyalida ([id number])   Visit Type: [address]   Date        Time      Length    Provider                  Department   6/15/2023    2:00 PM  15 mins. DO Tana Gamboa 93      Reason for Cancellation: Other      Patient Comments: I was able to get an appointment with Magnus Vides, for May 8, 2023 9:15 am, I urgently need an appointment, I can't wait for June, I was in the emergency room for pressure problems and shortness of breath, on Monday May 1, 2023,      Appointment Scheduled  (Newest Message First)  Mary Beth Blandon  Patient Appointment Cancel Request Pool 7 minutes ago (12:28 PM)       Appointment canceled for Mary Beth Blandon ([id number])  Visit Type: [address]  Date        Time      Length    Provider                  Department  6/15/2023    2:00 PM  15 mins. DO Tana Gamboa 93     Reason for Cancellation: Other     Patient Comments: I was able to get an appointment with Magnus Vides, for May 8, 2023 9:15 am, I urgently need an appointment, I can't wait for June, I was in the emergency room for pressure problems and shortness of breath, on Monday May 1, 2023,        Batch Job User Aimee  Charline, [place] 12 hours ago (11:58 PM)     "[employer] Processor  Christina Charline Yesterday (8:46 AM)     PM  Appointment Information:      Visit Type: Follow Up Appointment          Date: 6/15/2023                  Dept: [facility] Cardiology                  Provider: Mauro Elise                  Time: 2:00 PM                  Length: 15 min     Appt Status: Scheduled        Appt Instructions:      Please arrive 15 minutes prior to appointment time, bring insurance card, photo   ID and copay. Mary Beth Rossalida  Patient Appointment Schedule Request Pool Yesterday (8:46 AM)       Appointment For: Mary Beth Blandon ([id number])  Visit Type: [address] ([id number])     6/15/2023    2:00 PM  15 mins.

## 2023-05-04 VITALS
OXYGEN SATURATION: 100 % | DIASTOLIC BLOOD PRESSURE: 80 MMHG | HEART RATE: 59 BPM | RESPIRATION RATE: 13 BRPM | TEMPERATURE: 98.3 F | SYSTOLIC BLOOD PRESSURE: 147 MMHG

## 2023-05-04 LAB
ALBUMIN SERPL-MCNC: 3.8 G/DL (ref 3.5–4.6)
ALP SERPL-CCNC: 45 U/L (ref 40–130)
ALT SERPL-CCNC: 19 U/L (ref 0–33)
ANION GAP SERPL CALCULATED.3IONS-SCNC: 13 MEQ/L (ref 9–15)
AST SERPL-CCNC: 24 U/L (ref 0–35)
BASOPHILS # BLD: 0.1 K/UL (ref 0–0.2)
BASOPHILS NFR BLD: 3 %
BILIRUB SERPL-MCNC: 0.4 MG/DL (ref 0.2–0.7)
BUN SERPL-MCNC: 9 MG/DL (ref 6–20)
CALCIUM SERPL-MCNC: 8.7 MG/DL (ref 8.5–9.9)
CHLORIDE SERPL-SCNC: 104 MEQ/L (ref 95–107)
CO2 SERPL-SCNC: 21 MEQ/L (ref 20–31)
CREAT SERPL-MCNC: 0.52 MG/DL (ref 0.5–0.9)
EKG ATRIAL RATE: 54 BPM
EKG P AXIS: 25 DEGREES
EKG P-R INTERVAL: 142 MS
EKG Q-T INTERVAL: 446 MS
EKG QRS DURATION: 78 MS
EKG QTC CALCULATION (BAZETT): 422 MS
EKG R AXIS: -5 DEGREES
EKG T AXIS: 7 DEGREES
EKG VENTRICULAR RATE: 54 BPM
EOSINOPHIL # BLD: 0 K/UL (ref 0–0.7)
EOSINOPHIL NFR BLD: 0 %
ERYTHROCYTE [DISTWIDTH] IN BLOOD BY AUTOMATED COUNT: 13.1 % (ref 11.5–14.5)
GLOBULIN SER CALC-MCNC: 3 G/DL (ref 2.3–3.5)
GLUCOSE SERPL-MCNC: 83 MG/DL (ref 70–99)
HCT VFR BLD AUTO: 34.5 % (ref 37–47)
HGB BLD-MCNC: 11.9 G/DL (ref 12–16)
LYMPHOCYTES # BLD: 1.4 K/UL (ref 1–4.8)
LYMPHOCYTES NFR BLD: 35 %
MCH RBC QN AUTO: 32.1 PG (ref 27–31.3)
MCHC RBC AUTO-ENTMCNC: 34.4 % (ref 33–37)
MCV RBC AUTO: 93.4 FL (ref 79.4–94.8)
MONOCYTES # BLD: 0.3 K/UL (ref 0.2–0.8)
MONOCYTES NFR BLD: 8 %
NEUTROPHILS # BLD: 2.1 K/UL (ref 1.4–6.5)
NEUTS SEG NFR BLD: 54 %
OVALOCYTES BLD QL SMEAR: ABNORMAL
PLATELET # BLD AUTO: 242 K/UL (ref 130–400)
PLATELET BLD QL SMEAR: ADEQUATE
POTASSIUM SERPL-SCNC: 3.1 MEQ/L (ref 3.4–4.9)
PROT SERPL-MCNC: 6.8 G/DL (ref 6.3–8)
RBC # BLD AUTO: 3.69 M/UL (ref 4.2–5.4)
SODIUM SERPL-SCNC: 138 MEQ/L (ref 135–144)
TROPONIN T SERPL-MCNC: <0.01 NG/ML (ref 0–0.01)
WBC # BLD AUTO: 3.9 K/UL (ref 4.8–10.8)

## 2023-05-04 PROCEDURE — 36415 COLL VENOUS BLD VENIPUNCTURE: CPT

## 2023-05-04 PROCEDURE — 2500000003 HC RX 250 WO HCPCS: Performed by: EMERGENCY MEDICINE

## 2023-05-04 PROCEDURE — 96375 TX/PRO/DX INJ NEW DRUG ADDON: CPT

## 2023-05-04 PROCEDURE — 85025 COMPLETE CBC W/AUTO DIFF WBC: CPT

## 2023-05-04 PROCEDURE — 93010 ELECTROCARDIOGRAM REPORT: CPT | Performed by: INTERNAL MEDICINE

## 2023-05-04 PROCEDURE — 6370000000 HC RX 637 (ALT 250 FOR IP): Performed by: EMERGENCY MEDICINE

## 2023-05-04 PROCEDURE — 80053 COMPREHEN METABOLIC PANEL: CPT

## 2023-05-04 PROCEDURE — 96374 THER/PROPH/DIAG INJ IV PUSH: CPT

## 2023-05-04 PROCEDURE — 6360000002 HC RX W HCPCS: Performed by: EMERGENCY MEDICINE

## 2023-05-04 PROCEDURE — 84484 ASSAY OF TROPONIN QUANT: CPT

## 2023-05-04 RX ORDER — LABETALOL HYDROCHLORIDE 5 MG/ML
20 INJECTION, SOLUTION INTRAVENOUS ONCE
Status: COMPLETED | OUTPATIENT
Start: 2023-05-04 | End: 2023-05-04

## 2023-05-04 RX ORDER — POTASSIUM CHLORIDE 750 MG/1
10 TABLET, EXTENDED RELEASE ORAL DAILY
Qty: 30 TABLET | Refills: 0 | Status: SHIPPED | OUTPATIENT
Start: 2023-05-04

## 2023-05-04 RX ORDER — LISINOPRIL 10 MG/1
10 TABLET ORAL ONCE
Status: COMPLETED | OUTPATIENT
Start: 2023-05-04 | End: 2023-05-04

## 2023-05-04 RX ORDER — LORAZEPAM 2 MG/ML
1 INJECTION INTRAMUSCULAR ONCE
Status: COMPLETED | OUTPATIENT
Start: 2023-05-04 | End: 2023-05-04

## 2023-05-04 RX ORDER — LISINOPRIL 10 MG/1
10 TABLET ORAL DAILY
Qty: 30 TABLET | Refills: 0 | Status: SHIPPED | OUTPATIENT
Start: 2023-05-04 | End: 2023-05-08

## 2023-05-04 RX ORDER — LISINOPRIL 10 MG/1
10 TABLET ORAL DAILY
Qty: 30 TABLET | Refills: 0 | Status: SHIPPED | OUTPATIENT
Start: 2023-05-04 | End: 2023-05-04

## 2023-05-04 RX ORDER — POTASSIUM CHLORIDE 20 MEQ/1
40 TABLET, EXTENDED RELEASE ORAL ONCE
Status: COMPLETED | OUTPATIENT
Start: 2023-05-04 | End: 2023-05-04

## 2023-05-04 RX ADMIN — LORAZEPAM 1 MG: 2 INJECTION INTRAMUSCULAR; INTRAVENOUS at 00:26

## 2023-05-04 RX ADMIN — POTASSIUM CHLORIDE 40 MEQ: 1500 TABLET, EXTENDED RELEASE ORAL at 02:17

## 2023-05-04 RX ADMIN — LISINOPRIL 10 MG: 10 TABLET ORAL at 02:17

## 2023-05-04 RX ADMIN — LABETALOL HYDROCHLORIDE 20 MG: 5 INJECTION, SOLUTION INTRAVENOUS at 00:23

## 2023-05-04 ASSESSMENT — ENCOUNTER SYMPTOMS
ABDOMINAL PAIN: 0
CHEST TIGHTNESS: 0
VOMITING: 0
ABDOMINAL DISTENTION: 0
PHOTOPHOBIA: 0
COUGH: 0
NAUSEA: 0
DIARRHEA: 0
SORE THROAT: 0
WHEEZING: 0
SHORTNESS OF BREATH: 1
EYE DISCHARGE: 0

## 2023-05-04 NOTE — ED NOTES
Pt was given d/c instructions and educated on medication.  Pt had no further questions at this time     Keo Byrd RN  05/04/23 9051

## 2023-05-04 NOTE — ED PROVIDER NOTES
[address] [facility] ED  eMERGENCY dEPARTMENT eNCOUnter      Pt Name: Matthew Simon  MRN: [id number]  [place] 1980  Date of evaluation: 5/3/2023  Provider: Flaco Sellers MD    CHIEF COMPLAINT       Chief Complaint   Patient presents with    Chest Pain     States she was recently seen for these same symptoms and she hasnt gotten any better, reports CP, HTN, DIZZY, and blurred vision          HISTORY OF PRESENT ILLNESS   (Location/Symptom, Timing/Onset,Context/Setting, Quality, Duration, Modifying Factors, Severity)  Note limiting factors. Matthew Simon is a 38 y.o. female who presents to the emergency department with multiple complaints. Patient's been having problems with chest pain and dizzy feeling for the past several days. Was seen here 2 days ago for similar complaints and found to have a possible UTI negative cardiac work-up. She reports that these are similar symptoms to last year when she was seen here in September. So she is been having off-and-on for months almost a year. Not currently on any blood pressure medication that she did see her primary care physician yesterday who started her on Atarax for symptoms and Zithromax for upper respiratory and Prozac. She does not feel any better so she returns here for reevaluation. Recurrent chest pain    HPI    NursingNotes were reviewed. REVIEW OF SYSTEMS    (2-9 systems for level 4, 10 or more for level 5)     Review of Systems   Constitutional:  Positive for fatigue. Negative for chills and diaphoresis. HENT:  Negative for congestion, ear pain, mouth sores and sore throat. Eyes:  Negative for photophobia and discharge. Respiratory:  Positive for shortness of breath. Negative for cough, chest tightness and wheezing. Cardiovascular:  Positive for chest pain. Negative for palpitations. Gastrointestinal:  Negative for abdominal distention, abdominal pain, diarrhea, nausea and vomiting.    Endocrine: Negative for cold

## 2023-05-08 ENCOUNTER — OFFICE VISIT (OUTPATIENT)
Dept: CARDIOLOGY CLINIC | Age: 43
End: 2023-05-08
Payer: COMMERCIAL

## 2023-05-08 VITALS — SYSTOLIC BLOOD PRESSURE: 140 MMHG | DIASTOLIC BLOOD PRESSURE: 90 MMHG | HEART RATE: 74 BPM

## 2023-05-08 DIAGNOSIS — R07.9 CHEST PAIN, UNSPECIFIED TYPE: ICD-10-CM

## 2023-05-08 DIAGNOSIS — I10 PRIMARY HYPERTENSION: ICD-10-CM

## 2023-05-08 DIAGNOSIS — T78.40XA ALLERGIC DISORDER, INITIAL ENCOUNTER: Primary | ICD-10-CM

## 2023-05-08 PROCEDURE — G8428 CUR MEDS NOT DOCUMENT: HCPCS | Performed by: NURSE PRACTITIONER

## 2023-05-08 PROCEDURE — G8419 CALC BMI OUT NRM PARAM NOF/U: HCPCS | Performed by: NURSE PRACTITIONER

## 2023-05-08 PROCEDURE — 1036F TOBACCO NON-USER: CPT | Performed by: NURSE PRACTITIONER

## 2023-05-08 PROCEDURE — 3077F SYST BP >= 140 MM HG: CPT | Performed by: NURSE PRACTITIONER

## 2023-05-08 PROCEDURE — 99214 OFFICE O/P EST MOD 30 MIN: CPT | Performed by: NURSE PRACTITIONER

## 2023-05-08 PROCEDURE — 3080F DIAST BP >= 90 MM HG: CPT | Performed by: NURSE PRACTITIONER

## 2023-05-08 RX ORDER — PREDNISONE 50 MG/1
50 TABLET ORAL EVERY 6 HOURS
Qty: 3 TABLET | Refills: 0 | Status: SHIPPED | OUTPATIENT
Start: 2023-05-08 | End: 2023-05-09

## 2023-05-08 RX ORDER — LISINOPRIL 20 MG/1
20 TABLET ORAL DAILY
Qty: 30 TABLET | Refills: 3 | Status: SHIPPED | OUTPATIENT
Start: 2023-05-08

## 2023-05-08 RX ORDER — DIPHENHYDRAMINE HCL 25 MG
50 CAPSULE ORAL ONCE
Qty: 2 CAPSULE | Refills: 0 | Status: SHIPPED | OUTPATIENT
Start: 2023-05-08 | End: 2023-05-08

## 2023-05-08 NOTE — PATIENT INSTRUCTIONS
INCREASE LISINOPRIL TO 20MG DAILY  CHECK LABS IN 1-2 WEEKS  CARDIAC CTA THEN FOLLOW UP WITH DR PURVIS TO DISCUSS RESULTS

## 2023-05-08 NOTE — PROGRESS NOTES
Patient: Maxime Willard  YOB: 1980  MRN: [id number]    Chief Complaint:   Chief Complaint   Patient presents with    Shortness of Breath    Hypertension         Subjective/HPI   8/19/2022: Patient seen for initial medical evaluation. Patient with past medical history hypothyroidism, obesity, NANDO, PTSD. Patient seen today per her request for bradycardia. States she had an EKG at PCPs office that showed heart rate in the 40s. EKG in office today shows normal sinus rhythm, heart rate in the 60s. Patient also with complaints of intermittent, midsternal chest pain/pressure that radiates to left arm. States that sometimes pain feels like a burning or squeezing. Reports shortness of breath with exertion. Denies any other associated symptoms. Denies aggravating or alleviating factors. Patient also reports intermittent palpitations and feeling like her heart is skipping beats. Pt denies  change in exercise capacity, fatigue,  nausea, vomiting, diarrhea, constipation, motor weakness, insomnia, weight loss, syncope, dizziness, lightheadedness, PND, orthopnea, or claudication. No bleeding issues. Pt denies CHF type symptoms. No recent hospitalizations. Pt is compliant with all Rx medications. Blood pressure and heart rate are under control. 8/30/2022: pt here for follow up visit. Was in the ED on 8/28 with chest pain. States she was sitting in a movie when pain started. Work up at that time was unremarkable, troponins negative x 2, CP was reproducible. Pt was given SL Nitro x 2 with relief of pain. Denies any further episodes of chest pain. Pt states her BP has been running high lately. States she feels her HR going fast sometimes. Pt is scheduled for stress, echo and event monitor in the next few weeks      10-20-22: Status post echocardiogram in October 12, 2022 with normal LV function ejection fraction of 55% no valve abnormality.   Status post nuclear stress test with normal

## 2023-05-12 DIAGNOSIS — I10 PRIMARY HYPERTENSION: ICD-10-CM

## 2023-05-12 LAB
ALBUMIN SERPL-MCNC: 4 G/DL (ref 3.5–4.6)
ALP SERPL-CCNC: 48 U/L (ref 40–130)
ALT SERPL-CCNC: 19 U/L (ref 0–33)
ANION GAP SERPL CALCULATED.3IONS-SCNC: 13 MEQ/L (ref 9–15)
AST SERPL-CCNC: 20 U/L (ref 0–35)
BILIRUB SERPL-MCNC: 0.4 MG/DL (ref 0.2–0.7)
BUN SERPL-MCNC: 14 MG/DL (ref 6–20)
CALCIUM SERPL-MCNC: 8.8 MG/DL (ref 8.5–9.9)
CHLORIDE SERPL-SCNC: 107 MEQ/L (ref 95–107)
CO2 SERPL-SCNC: 22 MEQ/L (ref 20–31)
CREAT SERPL-MCNC: 0.72 MG/DL (ref 0.5–0.9)
GLOBULIN SER CALC-MCNC: 3.1 G/DL (ref 2.3–3.5)
GLUCOSE SERPL-MCNC: 74 MG/DL (ref 70–99)
MAGNESIUM SERPL-MCNC: 1.8 MG/DL (ref 1.7–2.4)
POTASSIUM SERPL-SCNC: 3.3 MEQ/L (ref 3.4–4.9)
PROT SERPL-MCNC: 7.1 G/DL (ref 6.3–8)
SODIUM SERPL-SCNC: 142 MEQ/L (ref 135–144)

## 2023-05-13 DIAGNOSIS — R07.9 CHEST PAIN, UNSPECIFIED TYPE: Primary | ICD-10-CM

## 2023-05-15 RX ORDER — NITROGLYCERIN 0.4 MG/1
0.4 TABLET SUBLINGUAL EVERY 5 MIN PRN
Qty: 25 TABLET | Refills: 3 | Status: SHIPPED | OUTPATIENT
Start: 2023-05-15

## 2023-05-15 NOTE — TELEPHONE ENCOUNTER
Requesting medication refill. Please approve or deny this request.    Rx requested:  Requested Prescriptions     Pending Prescriptions Disp Refills    nitroGLYCERIN (NITROSTAT) 0.4 MG SL tablet [Pharmacy Med Name: NITROGLYCERIN 0.4 MG SUBL 0.4 Tablet] 25 tablet 3     Sig: PLACE 1 TABLET UNDER THE TONGUE EVERY 5 MINUTES AS NEEDED FOR CHEST PAIN         Last Office Visit:   5/8/2023      Next Visit Date:  Future Appointments   Date Time Provider Otoniel Lemons   10/19/2023  2:30 PM Eduardo Barreto,  [facility]               Please approve or deny.

## 2023-05-22 ENCOUNTER — HOSPITAL ENCOUNTER (EMERGENCY)
Age: 43
Discharge: HOME | End: 2023-05-22
Attending: EMERGENCY MEDICINE
Payer: COMMERCIAL

## 2023-05-22 ENCOUNTER — APPOINTMENT (OUTPATIENT)
Dept: CT IMAGING | Age: 43
End: 2023-05-22
Payer: COMMERCIAL

## 2023-05-22 VITALS
HEART RATE: 58 BPM | BODY MASS INDEX: 26.53 KG/M2 | SYSTOLIC BLOOD PRESSURE: 140 MMHG | TEMPERATURE: 99.5 F | OXYGEN SATURATION: 100 % | RESPIRATION RATE: 16 BRPM | HEIGHT: 67 IN | WEIGHT: 169 LBS | DIASTOLIC BLOOD PRESSURE: 76 MMHG

## 2023-05-22 DIAGNOSIS — R07.89 ATYPICAL CHEST PAIN: Primary | ICD-10-CM

## 2023-05-22 LAB
ALBUMIN SERPL-MCNC: 4.2 G/DL (ref 3.5–4.6)
ALP SERPL-CCNC: 62 U/L (ref 40–130)
ALT SERPL-CCNC: 37 U/L (ref 0–33)
ANION GAP SERPL CALCULATED.3IONS-SCNC: 15 MEQ/L (ref 9–15)
AST SERPL-CCNC: 37 U/L (ref 0–35)
BASOPHILS # BLD: 0 K/UL (ref 0–0.2)
BASOPHILS NFR BLD: 0.8 %
BILIRUB SERPL-MCNC: 0.6 MG/DL (ref 0.2–0.7)
BUN SERPL-MCNC: 10 MG/DL (ref 6–20)
CALCIUM SERPL-MCNC: 9.1 MG/DL (ref 8.5–9.9)
CHLORIDE SERPL-SCNC: 103 MEQ/L (ref 95–107)
CO2 SERPL-SCNC: 20 MEQ/L (ref 20–31)
CREAT SERPL-MCNC: 0.54 MG/DL (ref 0.5–0.9)
D DIMER PPP FEU-MCNC: <0.27 MG/L FEU (ref 0–0.5)
EOSINOPHIL # BLD: 0.1 K/UL (ref 0–0.7)
EOSINOPHIL NFR BLD: 1.3 %
ERYTHROCYTE [DISTWIDTH] IN BLOOD BY AUTOMATED COUNT: 13.6 % (ref 11.5–14.5)
GLOBULIN SER CALC-MCNC: 3.1 G/DL (ref 2.3–3.5)
GLUCOSE SERPL-MCNC: 83 MG/DL (ref 70–99)
HCT VFR BLD AUTO: 34.9 % (ref 37–47)
HGB BLD-MCNC: 11.8 G/DL (ref 12–16)
LYMPHOCYTES # BLD: 1.1 K/UL (ref 1–4.8)
LYMPHOCYTES NFR BLD: 22.6 %
MCH RBC QN AUTO: 31.5 PG (ref 27–31.3)
MCHC RBC AUTO-ENTMCNC: 33.9 % (ref 33–37)
MCV RBC AUTO: 93 FL (ref 79.4–94.8)
MONOCYTES # BLD: 0.6 K/UL (ref 0.2–0.8)
MONOCYTES NFR BLD: 11.8 %
NEUTROPHILS # BLD: 3.1 K/UL (ref 1.4–6.5)
NEUTS SEG NFR BLD: 63.5 %
PERFORMED ON: ABNORMAL
PLATELET # BLD AUTO: 255 K/UL (ref 130–400)
POC CREATININE WHOLE BLOOD: 0.5
POC CREATININE: 0.5 MG/DL (ref 0.6–1.2)
POC SAMPLE TYPE: ABNORMAL
POTASSIUM SERPL-SCNC: 4.1 MEQ/L (ref 3.4–4.9)
PROT SERPL-MCNC: 7.3 G/DL (ref 6.3–8)
RBC # BLD AUTO: 3.75 M/UL (ref 4.2–5.4)
SODIUM SERPL-SCNC: 138 MEQ/L (ref 135–144)
TROPONIN T SERPL-MCNC: <0.01 NG/ML (ref 0–0.01)
WBC # BLD AUTO: 4.8 K/UL (ref 4.8–10.8)

## 2023-05-22 PROCEDURE — 2580000003 HC RX 258: Performed by: EMERGENCY MEDICINE

## 2023-05-22 PROCEDURE — 85025 COMPLETE CBC W/AUTO DIFF WBC: CPT

## 2023-05-22 PROCEDURE — 36415 COLL VENOUS BLD VENIPUNCTURE: CPT

## 2023-05-22 PROCEDURE — 71275 CT ANGIOGRAPHY CHEST: CPT

## 2023-05-22 PROCEDURE — A4216 STERILE WATER/SALINE, 10 ML: HCPCS | Performed by: EMERGENCY MEDICINE

## 2023-05-22 PROCEDURE — 96375 TX/PRO/DX INJ NEW DRUG ADDON: CPT

## 2023-05-22 PROCEDURE — 96374 THER/PROPH/DIAG INJ IV PUSH: CPT

## 2023-05-22 PROCEDURE — 84484 ASSAY OF TROPONIN QUANT: CPT

## 2023-05-22 PROCEDURE — 93005 ELECTROCARDIOGRAM TRACING: CPT | Performed by: EMERGENCY MEDICINE

## 2023-05-22 PROCEDURE — 2500000003 HC RX 250 WO HCPCS: Performed by: EMERGENCY MEDICINE

## 2023-05-22 PROCEDURE — 99285 EMERGENCY DEPT VISIT HI MDM: CPT

## 2023-05-22 PROCEDURE — 80053 COMPREHEN METABOLIC PANEL: CPT

## 2023-05-22 PROCEDURE — 6360000004 HC RX CONTRAST MEDICATION: Performed by: EMERGENCY MEDICINE

## 2023-05-22 PROCEDURE — 6360000002 HC RX W HCPCS: Performed by: EMERGENCY MEDICINE

## 2023-05-22 PROCEDURE — 6370000000 HC RX 637 (ALT 250 FOR IP): Performed by: EMERGENCY MEDICINE

## 2023-05-22 PROCEDURE — 85379 FIBRIN DEGRADATION QUANT: CPT

## 2023-05-22 RX ORDER — METHYLPREDNISOLONE SODIUM SUCCINATE 125 MG/2ML
125 INJECTION, POWDER, LYOPHILIZED, FOR SOLUTION INTRAMUSCULAR; INTRAVENOUS ONCE
Status: COMPLETED | OUTPATIENT
Start: 2023-05-22 | End: 2023-05-22

## 2023-05-22 RX ORDER — DIPHENHYDRAMINE HYDROCHLORIDE 50 MG/ML
25 INJECTION INTRAMUSCULAR; INTRAVENOUS ONCE
Status: COMPLETED | OUTPATIENT
Start: 2023-05-22 | End: 2023-05-22

## 2023-05-22 RX ORDER — NITROGLYCERIN 0.4 MG/1
0.4 TABLET SUBLINGUAL EVERY 5 MIN PRN
Status: DISCONTINUED | OUTPATIENT
Start: 2023-05-22 | End: 2023-05-22 | Stop reason: HOSPADM

## 2023-05-22 RX ADMIN — DIPHENHYDRAMINE HYDROCHLORIDE 25 MG: 50 INJECTION, SOLUTION INTRAMUSCULAR; INTRAVENOUS at 16:38

## 2023-05-22 RX ADMIN — IOPAMIDOL 75 ML: 612 INJECTION, SOLUTION INTRAVENOUS at 17:23

## 2023-05-22 RX ADMIN — FAMOTIDINE 20 MG: 10 INJECTION, SOLUTION INTRAVENOUS at 16:39

## 2023-05-22 RX ADMIN — METHYLPREDNISOLONE SODIUM SUCCINATE 125 MG: 125 INJECTION, POWDER, FOR SOLUTION INTRAMUSCULAR; INTRAVENOUS at 16:39

## 2023-05-22 RX ADMIN — NITROGLYCERIN 0.4 MG: 0.4 TABLET SUBLINGUAL at 16:29

## 2023-05-22 ASSESSMENT — ENCOUNTER SYMPTOMS
SHORTNESS OF BREATH: 1
ABDOMINAL PAIN: 0
CHEST TIGHTNESS: 0
SORE THROAT: 0
EYE PAIN: 0
VOMITING: 0
NAUSEA: 0

## 2023-05-22 ASSESSMENT — PAIN SCALES - GENERAL
PAINLEVEL_OUTOF10: 0
PAINLEVEL_OUTOF10: 8

## 2023-05-22 ASSESSMENT — PAIN - FUNCTIONAL ASSESSMENT
PAIN_FUNCTIONAL_ASSESSMENT: NONE - DENIES PAIN
PAIN_FUNCTIONAL_ASSESSMENT: 0-10

## 2023-05-22 ASSESSMENT — PAIN DESCRIPTION - ONSET: ONSET: ON-GOING

## 2023-05-22 ASSESSMENT — PAIN DESCRIPTION - LOCATION
LOCATION: CHEST
LOCATION: CHEST

## 2023-05-22 ASSESSMENT — PAIN DESCRIPTION - ORIENTATION: ORIENTATION: LEFT

## 2023-05-22 ASSESSMENT — PAIN DESCRIPTION - DESCRIPTORS: DESCRIPTORS: PRESSURE

## 2023-05-22 ASSESSMENT — PAIN DESCRIPTION - FREQUENCY: FREQUENCY: CONTINUOUS

## 2023-05-22 NOTE — ED NOTES
Pt states 0/10 chest pain after one dose of nitro, Shannon aware.      Quincy Collado RN  05/22/23 2026

## 2023-05-22 NOTE — ED PROVIDER NOTES
[address] [facility] ED  EMERGENCY DEPARTMENT ENCOUNTER      Pt Name: Kathryn Best  MRN: [id number]  [place] 1980  Date of evaluation: 5/22/2023  Provider: Mary Smith DO    CHIEF COMPLAINT       Chief Complaint   Patient presents with    Chest Pain     SOB, elevated blood pressure, dizziness, numbness in face and head         HISTORY OF PRESENT ILLNESS   (Location/Symptom, Timing/Onset, Context/Setting, Quality, Duration, Modifying Factors, Severity)  Note limiting factors. Kathryn Best is a 38 y.o. female who presents to the emergency department . Patient presents with chest pain and shortness of breath. This has been going on for some time and she has been seen a few times in the ER already. She saw Bisi Storey at Dr. Darrin Woodard office earlier in the month and an echocardiogram was ordered for June. CTA of the chest was also ordered but has not been done because patient would need to be premedicated. Despite the fact that she has been worked up several times her son is afraid that she is going to die in her sleep because they are not finding anything wrong with her despite her having all this chest pain and shortness of breath. She was prescribed hydroxyzine and has been taking it but she does not believe she has a problem with anxiety. Recently blood pressure has been elevated and she was started on lisinopril 10 mg which was just increased to 20 mg. Patient concerned about waiting for echocardiogram until June. HPI    Nursing Notes were reviewed. REVIEW OF SYSTEMS    (2-9 systems for level 4, 10 or more for level 5)     Review of Systems   Constitutional:  Negative for activity change, appetite change and fatigue. HENT:  Negative for congestion and sore throat. Eyes:  Negative for pain and visual disturbance. Respiratory:  Positive for shortness of breath. Negative for chest tightness. Cardiovascular:  Positive for chest pain.    Gastrointestinal:  Negative for

## 2023-05-22 NOTE — DISCHARGE INSTRUCTIONS
You can use the nitroglycerin as needed. This does not appear to be a heart issue.   CT scan and all labs, EKG normal.

## 2023-05-22 NOTE — ED TRIAGE NOTES
Pt c/o CP, SOB, head numbness, dizziness, an head pain. Pt states she was just watching TV when it started. Pt is A&OX4, skin intact, afebrile, breaths are equal and unlabored.

## 2023-05-23 LAB
EKG ATRIAL RATE: 63 BPM
EKG P AXIS: 57 DEGREES
EKG P-R INTERVAL: 134 MS
EKG Q-T INTERVAL: 422 MS
EKG QRS DURATION: 74 MS
EKG QTC CALCULATION (BAZETT): 431 MS
EKG R AXIS: -1 DEGREES
EKG T AXIS: 17 DEGREES
EKG VENTRICULAR RATE: 63 BPM

## 2023-05-25 ENCOUNTER — OFFICE VISIT (OUTPATIENT)
Dept: CARDIOLOGY CLINIC | Age: 43
End: 2023-05-25
Payer: COMMERCIAL

## 2023-05-25 VITALS
SYSTOLIC BLOOD PRESSURE: 130 MMHG | HEART RATE: 76 BPM | WEIGHT: 169.8 LBS | DIASTOLIC BLOOD PRESSURE: 60 MMHG | HEIGHT: 67 IN | BODY MASS INDEX: 26.65 KG/M2

## 2023-05-25 DIAGNOSIS — R06.09 DYSPNEA ON EXERTION: ICD-10-CM

## 2023-05-25 DIAGNOSIS — R07.2 PRECORDIAL PAIN: Primary | ICD-10-CM

## 2023-05-25 DIAGNOSIS — G47.33 OSA (OBSTRUCTIVE SLEEP APNEA): ICD-10-CM

## 2023-05-25 DIAGNOSIS — Z98.84 HISTORY OF BARIATRIC SURGERY: ICD-10-CM

## 2023-05-25 PROBLEM — E66.01 MORBID OBESITY (HCC): Status: RESOLVED | Noted: 2018-02-21 | Resolved: 2023-05-25

## 2023-05-25 PROCEDURE — G8419 CALC BMI OUT NRM PARAM NOF/U: HCPCS | Performed by: INTERNAL MEDICINE

## 2023-05-25 PROCEDURE — 1036F TOBACCO NON-USER: CPT | Performed by: INTERNAL MEDICINE

## 2023-05-25 PROCEDURE — 99213 OFFICE O/P EST LOW 20 MIN: CPT | Performed by: INTERNAL MEDICINE

## 2023-05-25 PROCEDURE — G8428 CUR MEDS NOT DOCUMENT: HCPCS | Performed by: INTERNAL MEDICINE

## 2023-05-25 RX ORDER — BUSPIRONE HYDROCHLORIDE 7.5 MG/1
TABLET ORAL
COMMUNITY
Start: 2023-05-18

## 2023-05-25 RX ORDER — ASPIRIN 81 MG/1
81 TABLET ORAL DAILY
COMMUNITY

## 2023-05-25 NOTE — PROGRESS NOTES
Dyspnea on exertion          2. Bradycardia. No orders of the defined types were placed in this encounter. No orders of the defined types were placed in this encounter. Return if symptoms worsen or fail to improve. Plan      Await cardiac CT angiogram resolved. Patient continues to have chest pain despite negative nuclear stress test.    Follow up with PCP for labs     Nitro SL PRn    We will need to continue to monitor muscle and liver enzymes, BUN, CR, and electrolytes. The nature of cardiac risk has been fully discussed with this patient. I have made him aware of his LDL target goal given his cardiovascular risk analysis. I have discussed the appropriate diet. The need for lifelong compliance in order to reduce risk is stressed. A regular exercise program is recommended to help achieve and maintain normal body weight, fitness and improve lipid balance. Details of medical condition explained and patient was warned about adverse consequences of uncontrolled medical conditions and possible side effects of prescribed medications. Patient was advised and encouraged to check blood pressure at home or at a pharmacy, maintain a logbook, and also call us back if blood pressures are above or below the target ranges. Patient clearly understands and agrees to these instructions. Counseling: The patient has been advised to contact us if they experience chest pain, palpitations, progressive SOB, orthopnea, paroxysmal nocturnal dyspnea, or progressive edema.

## 2023-08-06 ENCOUNTER — HOSPITAL ENCOUNTER (EMERGENCY)
Age: 43
Discharge: HOME | End: 2023-08-06
Attending: FAMILY MEDICINE
Payer: COMMERCIAL

## 2023-08-06 ENCOUNTER — APPOINTMENT (OUTPATIENT)
Dept: CT IMAGING | Age: 43
End: 2023-08-06
Payer: COMMERCIAL

## 2023-08-06 VITALS
BODY MASS INDEX: 24.96 KG/M2 | DIASTOLIC BLOOD PRESSURE: 83 MMHG | HEIGHT: 67 IN | HEART RATE: 98 BPM | SYSTOLIC BLOOD PRESSURE: 147 MMHG | TEMPERATURE: 98.7 F | OXYGEN SATURATION: 100 % | WEIGHT: 159 LBS | RESPIRATION RATE: 20 BRPM

## 2023-08-06 DIAGNOSIS — E23.6 EMPTY SELLA (HCC): ICD-10-CM

## 2023-08-06 DIAGNOSIS — F41.0 PANIC ATTACK: Primary | ICD-10-CM

## 2023-08-06 LAB
ALBUMIN SERPL-MCNC: 4.2 G/DL (ref 3.5–4.6)
ALP SERPL-CCNC: 62 U/L (ref 40–130)
ALT SERPL-CCNC: 27 U/L (ref 0–33)
ANION GAP SERPL CALCULATED.3IONS-SCNC: 18 MEQ/L (ref 9–15)
AST SERPL-CCNC: 42 U/L (ref 0–35)
BASOPHILS # BLD: 0 K/UL (ref 0–0.2)
BASOPHILS NFR BLD: 0 %
BILIRUB SERPL-MCNC: 0.4 MG/DL (ref 0.2–0.7)
BUN SERPL-MCNC: 8 MG/DL (ref 6–20)
CALCIUM SERPL-MCNC: 9.1 MG/DL (ref 8.5–9.9)
CHLORIDE SERPL-SCNC: 106 MEQ/L (ref 95–107)
CK SERPL-CCNC: 179 U/L (ref 0–170)
CO2 SERPL-SCNC: 17 MEQ/L (ref 20–31)
CREAT SERPL-MCNC: 0.5 MG/DL (ref 0.5–0.9)
EOSINOPHIL # BLD: 0 K/UL (ref 0–0.7)
EOSINOPHIL NFR BLD: 1 %
ERYTHROCYTE [DISTWIDTH] IN BLOOD BY AUTOMATED COUNT: 15.6 % (ref 11.5–14.5)
GLOBULIN SER CALC-MCNC: 3.2 G/DL (ref 2.3–3.5)
GLUCOSE SERPL-MCNC: 73 MG/DL (ref 70–99)
HCT VFR BLD AUTO: 36.3 % (ref 37–47)
HGB BLD-MCNC: 12.4 G/DL (ref 12–16)
LYMPHOCYTES # BLD: 1.1 K/UL (ref 1–4.8)
LYMPHOCYTES NFR BLD: 31 %
MCH RBC QN AUTO: 34 PG (ref 27–31.3)
MCHC RBC AUTO-ENTMCNC: 34.2 % (ref 33–37)
MCV RBC AUTO: 99.4 FL (ref 79.4–94.8)
MONOCYTES # BLD: 0.1 K/UL (ref 0.2–0.8)
MONOCYTES NFR BLD: 3 %
NEUTROPHILS # BLD: 2.3 K/UL (ref 1.4–6.5)
NEUTS BAND NFR BLD MANUAL: 4 % (ref 5–11)
NEUTS SEG NFR BLD: 61 %
PLATELET # BLD AUTO: 296 K/UL (ref 130–400)
POTASSIUM SERPL-SCNC: 3.9 MEQ/L (ref 3.4–4.9)
PROT SERPL-MCNC: 7.4 G/DL (ref 6.3–8)
RBC # BLD AUTO: 3.65 M/UL (ref 4.2–5.4)
SODIUM SERPL-SCNC: 141 MEQ/L (ref 135–144)
TROPONIN T SERPL-MCNC: <0.01 NG/ML (ref 0–0.01)
WBC # BLD AUTO: 3.5 K/UL (ref 4.8–10.8)

## 2023-08-06 PROCEDURE — 80053 COMPREHEN METABOLIC PANEL: CPT

## 2023-08-06 PROCEDURE — 99284 EMERGENCY DEPT VISIT MOD MDM: CPT

## 2023-08-06 PROCEDURE — 70450 CT HEAD/BRAIN W/O DYE: CPT

## 2023-08-06 PROCEDURE — 82550 ASSAY OF CK (CPK): CPT

## 2023-08-06 PROCEDURE — 93005 ELECTROCARDIOGRAM TRACING: CPT | Performed by: PHYSICIAN ASSISTANT

## 2023-08-06 PROCEDURE — 84484 ASSAY OF TROPONIN QUANT: CPT

## 2023-08-06 PROCEDURE — 36415 COLL VENOUS BLD VENIPUNCTURE: CPT

## 2023-08-06 PROCEDURE — 85025 COMPLETE CBC W/AUTO DIFF WBC: CPT

## 2023-08-06 ASSESSMENT — PAIN DESCRIPTION - DESCRIPTORS: DESCRIPTORS: ACHING

## 2023-08-06 ASSESSMENT — PAIN DESCRIPTION - FREQUENCY: FREQUENCY: CONTINUOUS

## 2023-08-06 ASSESSMENT — PAIN - FUNCTIONAL ASSESSMENT: PAIN_FUNCTIONAL_ASSESSMENT: 0-10

## 2023-08-06 ASSESSMENT — LIFESTYLE VARIABLES
HOW MANY STANDARD DRINKS CONTAINING ALCOHOL DO YOU HAVE ON A TYPICAL DAY: 1 OR 2
HOW OFTEN DO YOU HAVE A DRINK CONTAINING ALCOHOL: MONTHLY OR LESS

## 2023-08-06 ASSESSMENT — PAIN SCALES - GENERAL: PAINLEVEL_OUTOF10: 7

## 2023-08-06 ASSESSMENT — PAIN DESCRIPTION - LOCATION: LOCATION: HEAD;CHEST

## 2023-08-06 ASSESSMENT — PAIN DESCRIPTION - PAIN TYPE: TYPE: ACUTE PAIN

## 2023-08-06 NOTE — ED PROVIDER NOTES
abnormal muscle tone. Coordination: Coordination normal.      Deep Tendon Reflexes: Reflexes normal.   Psychiatric:         Behavior: Behavior normal.         Thought Content: Thought content normal.         Judgment: Judgment normal.           RESULTS     EKG: All EKG's are interpreted by the Emergency Department Physician who either signs or Co-signsthis chart in the absence of a cardiologist.  EKG: normal EKG, normal sinus rhythm. RADIOLOGY:   Non-plain filmimages such as CT, Ultrasound and MRI are read by the radiologist. Plain radiographic images are visualized and preliminarily interpreted by the emergency physician with the below findings:        Interpretation per the Radiologist below, if available at the time ofthis note:    CT HEAD WO CONTRAST   Final Result   No acute intracranial abnormality. ED BEDSIDE ULTRASOUND:   Performed by ED Physician - none    LABS:  Labs Reviewed   CBC WITH AUTO DIFFERENTIAL - Abnormal; Notable for the following components:       Result Value    WBC 3.5 (*)     RBC 3.65 (*)     Hematocrit 36.3 (*)     MCV 99.4 (*)     MCH 34.0 (*)     RDW 15.6 (*)     Monocytes Absolute 0.1 (*)     Bands Relative 4 (*)     All other components within normal limits   COMPREHENSIVE METABOLIC PANEL W/ REFLEX TO MG FOR LOW K - Abnormal; Notable for the following components:    CO2 17 (*)     Anion Gap 18 (*)     AST 42 (*)     All other components within normal limits   CK - Abnormal; Notable for the following components: Total  (*)     All other components within normal limits   TROPONIN       All other labs were within normal range or not returned as of this dictation. Procedures      Medical Decision Making  38years old with past medical history of posttraumatic stress disorder obstructive sleep apnea hypothyroidism.   Patient presented to the ED today with an episode of dizziness states with driving and felt very dizzy and had to pull to the side of the I personally performed the service described in the documentation recorded by the scribe in my presence, and it accurately and completely records my words and actions.

## 2023-08-06 NOTE — ED TRIAGE NOTES
Pt states that she had sudden onset of CP and headache while driving. Pt states that she has a hx of anxiety. Pt had cramping to the hands and increased breathing when she arrived at er. Pt states she continues to have pain at this time.

## 2023-08-07 LAB
EKG ATRIAL RATE: 84 BPM
EKG P AXIS: 62 DEGREES
EKG P-R INTERVAL: 140 MS
EKG Q-T INTERVAL: 390 MS
EKG QRS DURATION: 74 MS
EKG QTC CALCULATION (BAZETT): 460 MS
EKG R AXIS: 9 DEGREES
EKG T AXIS: 16 DEGREES
EKG VENTRICULAR RATE: 84 BPM

## 2023-08-07 NOTE — ED NOTES
Patient alert and oriented at this time. Patient skin appropriate for ethnicity, warm, and dry. Patient respirations are even and unlabored at this time. Patient ambulatory on discharge. Discharge instructions reviewed with patient and patients family. Patient and patients family verbalized understanding and states no questions at this time.       Johanna Moreno RN  08/06/23 4883

## 2023-10-10 RX ORDER — LISINOPRIL 20 MG/1
20 TABLET ORAL DAILY
Qty: 90 TABLET | Refills: 3 | Status: SHIPPED | OUTPATIENT
Start: 2023-10-10

## 2023-10-10 NOTE — TELEPHONE ENCOUNTER
Please approve or deny this refill request. The order is pended. Thank you.     LOV 5/8/2023    Next Visit Date:  Future Appointments   Date Time Provider [address] [place] [address]   10/19/2023  2:30 PM Holiday, [id number] [facility], DO [address] [facility]

## 2023-11-05 RX ORDER — LISINOPRIL 20 MG/1
20 TABLET ORAL DAILY
Qty: 90 TABLET | Refills: 1 | Status: SHIPPED | OUTPATIENT
Start: 2023-11-05

## 2023-12-09 DIAGNOSIS — R07.9 CHEST PAIN, UNSPECIFIED TYPE: ICD-10-CM

## 2023-12-10 RX ORDER — NITROGLYCERIN 0.4 MG/1
0.4 TABLET SUBLINGUAL EVERY 5 MIN PRN
Qty: 25 TABLET | Refills: 3 | Status: SHIPPED | OUTPATIENT
Start: 2023-12-10

## 2024-03-28 ENCOUNTER — TELEPHONE (OUTPATIENT)
Dept: CARDIOLOGY CLINIC | Age: 44
End: 2024-03-28

## 2024-03-28 NOTE — TELEPHONE ENCOUNTER
Appointment Request From: Christina Olivier      With Provider: Eduardo Maciel DO [[facility]]      Preferred Date Range: 3/29/2024 - 4/1/2024      Preferred Times: Any Time      Reason for visit: Request an Appointment      Comments:   I have continued with the same symptoms,     Appointment Request  (Newest Message First)  Christina Olivier  P [facility] Cardiology Front Desk2 minutes ago (2:23 PM)       Appointment Request From: Christina Olivier     With Provider: Eduardo Maciel DO [[facility]]     Preferred Date Range: 3/29/2024 - 4/1/2024     Preferred Times: Any Time     Reason for visit: Request an Appointment     Comments:  I have continued with the same symptoms,

## 2024-03-29 ENCOUNTER — HOSPITAL ENCOUNTER (OUTPATIENT)
Dept: WOMENS IMAGING | Age: 44
End: 2024-03-29
Payer: COMMERCIAL

## 2024-03-29 VITALS — BODY MASS INDEX: 24.9 KG/M2 | HEIGHT: 67 IN

## 2024-03-29 DIAGNOSIS — Z12.31 ENCOUNTER FOR SCREENING MAMMOGRAM FOR BREAST CANCER: ICD-10-CM

## 2024-03-29 PROCEDURE — 77063 BREAST TOMOSYNTHESIS BI: CPT

## 2024-03-30 DIAGNOSIS — R07.9 CHEST PAIN, UNSPECIFIED TYPE: ICD-10-CM

## 2024-03-30 RX ORDER — NITROGLYCERIN 0.4 MG/1
0.4 TABLET SUBLINGUAL EVERY 5 MIN PRN
Qty: 25 TABLET | Refills: 3 | Status: SHIPPED | OUTPATIENT
Start: 2024-03-30

## 2024-04-03 NOTE — PROGRESS NOTES
Patient ID:  Christina Olivier is a 43 y.o. female who presents today for follow up of left knee pain.    Injury: yes; fall two months ago    Location: left knee pain, located on the anterior aspect of the knee  Pain: yes; 7 on a scale of 1 to 10  Onset: sudden  Duration: 2 months  Frequency:  occurs daily  Quality: aching and boring   Swelling: patient does not note any swelling of the joint  Aggravating factors: weight bearing activity, standing, and walking  Alleviating factors: removing weight from leg and rest  Mechanical symptoms: none  Radiation: no    Activities: walking independently  Restriction:  decreased ambulatory tolerance  Progression:  worsening    Previous treatment:  none  NSAIDs:  none  PT:  none    Medications:    Current Outpatient Medications on File Prior to Visit   Medication Sig Dispense Refill    lisinopril (PRINIVIL;ZESTRIL) 20 MG tablet TAKE 1 TABLET BY MOUTH DAILY 90 tablet 1    nitroGLYCERIN (NITROSTAT) 0.4 MG SL tablet PLACE 1 TABLET UNDER THE TONGUE EVERY 5 MINUTES AS NEEDED FOR CHEST PAIN (Patient not taking: Reported on 4/4/2024) 25 tablet 3    aspirin 81 MG EC tablet Take 1 tablet by mouth daily (Patient not taking: Reported on 8/6/2023)       No current facility-administered medications on file prior to visit.       Allergies:    Allergies   Allergen Reactions    Dye [Iodides] Rash    Naproxen Swelling     Facing swelling bilat eyes    Red Dye Hives       Past Medical History:    Past Medical History:   Diagnosis Date    Allergic rhinitis     Depressive disorder     Dyspnea on exertion 05/25/2023    History of bariatric surgery 05/25/2023    Hypothyroidism     Inverted nipple     bilateral - normal for her    Migraine     Obesity     NANDO (obstructive sleep apnea)     Osteoarthritis     Precordial pain 10/20/2022    PTSD (post-traumatic stress disorder)        Past Surgical History:    Past Surgical History:   Procedure Laterality Date    GASTRIC BYPASS SURGERY  2020

## 2024-04-04 ENCOUNTER — OFFICE VISIT (OUTPATIENT)
Dept: ORTHOPEDIC SURGERY | Age: 44
End: 2024-04-04
Payer: COMMERCIAL

## 2024-04-04 VITALS — WEIGHT: 166 LBS | BODY MASS INDEX: 26.06 KG/M2 | HEIGHT: 67 IN

## 2024-04-04 DIAGNOSIS — S82.025A CLOSED NONDISPLACED LONGITUDINAL FRACTURE OF LEFT PATELLA, INITIAL ENCOUNTER: Primary | ICD-10-CM

## 2024-04-04 PROCEDURE — 1036F TOBACCO NON-USER: CPT | Performed by: ORTHOPAEDIC SURGERY

## 2024-04-04 PROCEDURE — G8427 DOCREV CUR MEDS BY ELIG CLIN: HCPCS | Performed by: ORTHOPAEDIC SURGERY

## 2024-04-04 PROCEDURE — G8419 CALC BMI OUT NRM PARAM NOF/U: HCPCS | Performed by: ORTHOPAEDIC SURGERY

## 2024-04-04 PROCEDURE — 99204 OFFICE O/P NEW MOD 45 MIN: CPT | Performed by: ORTHOPAEDIC SURGERY

## 2024-04-04 ASSESSMENT — ENCOUNTER SYMPTOMS
DIARRHEA: 0
ABDOMINAL PAIN: 0
CONSTIPATION: 0
COUGH: 0
EYE DISCHARGE: 0

## 2024-04-11 ENCOUNTER — HOSPITAL ENCOUNTER (OUTPATIENT)
Dept: CT IMAGING | Age: 44
Discharge: HOME | End: 2024-04-13
Attending: ORTHOPAEDIC SURGERY
Payer: COMMERCIAL

## 2024-04-11 ENCOUNTER — OFFICE VISIT (OUTPATIENT)
Dept: CARDIOLOGY CLINIC | Age: 44
End: 2024-04-11
Payer: COMMERCIAL

## 2024-04-11 VITALS
DIASTOLIC BLOOD PRESSURE: 70 MMHG | RESPIRATION RATE: 14 BRPM | WEIGHT: 172.6 LBS | BODY MASS INDEX: 27.03 KG/M2 | HEART RATE: 53 BPM | SYSTOLIC BLOOD PRESSURE: 110 MMHG | OXYGEN SATURATION: 99 %

## 2024-04-11 DIAGNOSIS — R00.2 PALPITATIONS: ICD-10-CM

## 2024-04-11 DIAGNOSIS — R07.89 CHEST PAIN, ATYPICAL: ICD-10-CM

## 2024-04-11 DIAGNOSIS — R06.09 DYSPNEA ON EXERTION: Primary | ICD-10-CM

## 2024-04-11 DIAGNOSIS — S82.025A CLOSED NONDISPLACED LONGITUDINAL FRACTURE OF LEFT PATELLA, INITIAL ENCOUNTER: ICD-10-CM

## 2024-04-11 PROCEDURE — 1036F TOBACCO NON-USER: CPT

## 2024-04-11 PROCEDURE — 73700 CT LOWER EXTREMITY W/O DYE: CPT

## 2024-04-11 PROCEDURE — G8419 CALC BMI OUT NRM PARAM NOF/U: HCPCS

## 2024-04-11 PROCEDURE — G8427 DOCREV CUR MEDS BY ELIG CLIN: HCPCS

## 2024-04-11 PROCEDURE — 99213 OFFICE O/P EST LOW 20 MIN: CPT

## 2024-04-11 PROCEDURE — 93000 ELECTROCARDIOGRAM COMPLETE: CPT

## 2024-04-11 RX ORDER — ISOSORBIDE MONONITRATE 60 MG/1
60 TABLET, EXTENDED RELEASE ORAL DAILY
Qty: 30 TABLET | Refills: 3 | Status: SHIPPED | OUTPATIENT
Start: 2024-04-11

## 2024-04-11 NOTE — PROGRESS NOTES
RBC 3.65 08/06/2023 05:15 PM    HGB 12.4 08/06/2023 05:15 PM    HCT 36.3 08/06/2023 05:15 PM    MCV 99.4 08/06/2023 05:15 PM    MCH 34.0 08/06/2023 05:15 PM    MCHC 34.2 08/06/2023 05:15 PM    RDW 15.6 08/06/2023 05:15 PM     08/06/2023 05:15 PM    MPV 9.5 03/27/2013 03:12 PM     Lipids:  Lab Results   Component Value Date    CHOL 134 07/29/2022     Lab Results   Component Value Date    TRIG 49 07/29/2022     Lab Results   Component Value Date    HDL 75 (H) 07/29/2022     Lab Results   Component Value Date    LDLCALC 49 07/29/2022     No results found for: \"VLDL\"  No results found for: \"CHOLHDLRATIO\"  CMP:    Lab Results   Component Value Date/Time     08/06/2023 05:15 PM    K 3.9 08/06/2023 05:15 PM     08/06/2023 05:15 PM    CO2 17 08/06/2023 05:15 PM    BUN 8 08/06/2023 05:15 PM    CREATININE 0.50 08/06/2023 05:15 PM    GFRAA >60.0 08/28/2022 11:15 PM    LABGLOM >60.0 08/06/2023 05:15 PM    GLUCOSE 73 08/06/2023 05:15 PM    PROT 7.4 08/06/2023 05:15 PM    LABALBU 4.2 08/06/2023 05:15 PM    CALCIUM 9.1 08/06/2023 05:15 PM    BILITOT 0.4 08/06/2023 05:15 PM    ALKPHOS 62 08/06/2023 05:15 PM    AST 42 08/06/2023 05:15 PM    ALT 27 08/06/2023 05:15 PM     BMP:    Lab Results   Component Value Date/Time     08/06/2023 05:15 PM    K 3.9 08/06/2023 05:15 PM     08/06/2023 05:15 PM    CO2 17 08/06/2023 05:15 PM    BUN 8 08/06/2023 05:15 PM    LABALBU 4.2 08/06/2023 05:15 PM    CREATININE 0.50 08/06/2023 05:15 PM    CALCIUM 9.1 08/06/2023 05:15 PM    GFRAA >60.0 08/28/2022 11:15 PM    LABGLOM >60.0 08/06/2023 05:15 PM    GLUCOSE 73 08/06/2023 05:15 PM     Magnesium:    Lab Results   Component Value Date/Time    MG 1.8 05/12/2023 04:04 PM     TSH:  Lab Results   Component Value Date    TSH 3.200 05/01/2023     .result  No results for input(s): \"PROBNP\" in the last 72 hours.  No results for input(s): \"INR\" in the last 72 hours.      Patient Active Problem List   Diagnosis    PTSD

## 2024-05-29 NOTE — PROGRESS NOTES
Patient ID:  Christina Olivier is a 43 y.o. female who presents today for follow up of left knee pain.  We communicated through the interpretation service.  She was diagnosed with a nondisplaced patella fracture.  She got a CT on 4/11/2024.  It showed:    FINDINGS:  Bones: Vertically oriented linear fracture is seen through the lateral margin  of the patella.  There is no displacement.  Faint sclerosis is observed.  There is mild osteopenia.  Benign geographic peripherally sclerotic focus  seen in the medial femoral metaphysis.     Soft Tissue: No significant soft tissue edema or fluid collections.     Joint: Chondrocalcinosis noted in the medial and lateral compartments.  No  large joint effusion.  Moderate muscular atrophy noted     IMPRESSION:  1. Vertically oriented linear fracture through the lateral margin of the  patella. There is no displacement. Faint sclerosis is observed.  2. Chondrocalcinosis in the medial and lateral compartments.  3. Moderate muscular atrophy.     Injury: yes; fall in February     Location: left knee pain, located on the anterior aspect of the knee  Pain: yes; 7 on a scale of 1 to 10  Onset: sudden  Duration: 3 months  Frequency:  occurs daily  Quality: aching and boring   Swelling: patient does not note any swelling of the joint  Aggravating factors: weight bearing activity, standing, and walking  Alleviating factors: removing weight from leg and rest  Mechanical symptoms: none  Radiation: no     Activities: walking independently  Restriction:  decreased ambulatory tolerance  Progression:  worsening     Previous treatment:  none  NSAIDs:  none  PT:  none       Medications:    Current Outpatient Medications on File Prior to Visit   Medication Sig Dispense Refill    isosorbide mononitrate (IMDUR) 60 MG extended release tablet Take 1 tablet by mouth daily 30 tablet 3    lisinopril (PRINIVIL;ZESTRIL) 20 MG tablet TAKE 1 TABLET BY MOUTH DAILY 90 tablet 1    nitroGLYCERIN (NITROSTAT) 0.4 MG

## 2024-05-30 ENCOUNTER — OFFICE VISIT (OUTPATIENT)
Dept: ORTHOPEDIC SURGERY | Age: 44
End: 2024-05-30
Payer: COMMERCIAL

## 2024-05-30 VITALS
HEART RATE: 74 BPM | HEIGHT: 67 IN | BODY MASS INDEX: 27 KG/M2 | OXYGEN SATURATION: 97 % | TEMPERATURE: 98.2 F | WEIGHT: 172 LBS

## 2024-05-30 DIAGNOSIS — S82.025D CLOSED NONDISPLACED LONGITUDINAL FRACTURE OF LEFT PATELLA WITH ROUTINE HEALING, SUBSEQUENT ENCOUNTER: Primary | ICD-10-CM

## 2024-05-30 DIAGNOSIS — M76.52 PATELLAR TENDINITIS OF BOTH KNEES: ICD-10-CM

## 2024-05-30 DIAGNOSIS — M76.51 PATELLAR TENDINITIS OF BOTH KNEES: ICD-10-CM

## 2024-05-30 PROCEDURE — 1036F TOBACCO NON-USER: CPT | Performed by: ORTHOPAEDIC SURGERY

## 2024-05-30 PROCEDURE — G8427 DOCREV CUR MEDS BY ELIG CLIN: HCPCS | Performed by: ORTHOPAEDIC SURGERY

## 2024-05-30 PROCEDURE — 99214 OFFICE O/P EST MOD 30 MIN: CPT | Performed by: ORTHOPAEDIC SURGERY

## 2024-05-30 PROCEDURE — G8419 CALC BMI OUT NRM PARAM NOF/U: HCPCS | Performed by: ORTHOPAEDIC SURGERY

## 2024-05-30 RX ORDER — MELOXICAM 15 MG/1
15 TABLET ORAL DAILY
Qty: 30 TABLET | Refills: 1 | Status: SHIPPED | OUTPATIENT
Start: 2024-05-30 | End: 2024-06-29

## 2024-06-07 ENCOUNTER — HOSPITAL ENCOUNTER (OUTPATIENT)
Dept: PHYSICAL THERAPY | Age: 44
Setting detail: THERAPIES SERIES
Discharge: HOME | End: 2024-06-07
Attending: ORTHOPAEDIC SURGERY
Payer: COMMERCIAL

## 2024-06-07 PROCEDURE — 97162 PT EVAL MOD COMPLEX 30 MIN: CPT

## 2024-06-07 ASSESSMENT — PAIN SCALES - GENERAL: PAINLEVEL_OUTOF10: 7

## 2024-06-07 ASSESSMENT — PAIN DESCRIPTION - LOCATION: LOCATION: KNEE

## 2024-06-07 ASSESSMENT — PAIN DESCRIPTION - ORIENTATION: ORIENTATION: RIGHT;LEFT

## 2024-06-07 NOTE — PROGRESS NOTES
[facility] Physical Therapy-  [facility]  PHYSICAL THERAPY EVALUATION    Physical Therapy: Initial Evaluation    Patient: Christina Olivier (43 y.o.     female)   Examination Date: 2024   :  1980 ;    Confirmed: Yes MRN: [id number]  CSN: [id number]   Insurance: Payor: [facility] / [facility] [place] [facility] / Product Type: *No Product type* /   Insurance ID: [id number] - (Medicaid Managed) Secondary Insurance (if applicable):    Referring Physician: Suman Mittal MD       Visits to Date/Visits Approved:   (Eval only)    No Show/Cancelled Appts: 0      Medical Diagnosis: Patellar tendinitis of both knees [M76.51, M76.52]        Treatment Diagnosis: Darius knee pain     PERTINENT MEDICAL HISTORY           Medical History: Chart Reviewed: Yes   Past Medical History:   Diagnosis Date    Allergic rhinitis     Depressive disorder     Dyspnea on exertion 2023    History of bariatric surgery 2023    Hypothyroidism     Inverted nipple     bilateral - normal for her    Migraine     Obesity     NANDO (obstructive sleep apnea)     Osteoarthritis     Precordial pain 10/20/2022    PTSD (post-traumatic stress disorder)      Surgical History:   Past Surgical History:   Procedure Laterality Date    GASTRIC BYPASS SURGERY      HYSTERECTOMY (CERVIX STATUS UNKNOWN)      Partial-still has 1 ovary    JOINT REPLACEMENT Left     OVARY REMOVAL      only removed 1 ovary       Medications:   Current Outpatient Medications:     meloxicam (MOBIC) 15 MG tablet, Take 1 tablet by mouth daily, Disp: 30 tablet, Rfl: 1    isosorbide mononitrate (IMDUR) 60 MG extended release tablet, Take 1 tablet by mouth daily, Disp: 30 tablet, Rfl: 3    nitroGLYCERIN (NITROSTAT) 0.4 MG SL tablet, PLACE 1 TABLET UNDER THE TONGUE EVERY 5 MINUTES AS NEEDED FOR CHEST PAIN (Patient not taking: Reported on 2024), Disp: 25 tablet, Rfl: 3    lisinopril (PRINIVIL;ZESTRIL) 20 MG tablet, TAKE 1 TABLET BY

## 2024-06-07 NOTE — THERAPY EVALUATION
[facility]  PHYSICAL THERAPY PLAN OF CARE                                    [facility] [place] [address]     Ph: [phone]  Fax: [phone]      [x] Certification  [] Recertification []  Plan of Care  [] Progress Note [] Discharge      Referring Provider: Suman Mittal MD     From:  Chastity Salas, PT  Patient: Christina Olivier (43 y.o. female) : 1980 Date: 2024  Medical Diagnosis: Patellar tendinitis of both knees [M76.51, M76.52]       Treatment Diagnosis: Roosevelt knee pain    Progress Report Period from:  2024  to 2024    Visits to Date: 1 No Show: 0 Cancelled Appts: 0    OBJECTIVE:   Short Term Goals - Time Frame for Short Term Goals: 2 weeks    Goals Current/Discharge status  Status   Short Term Goal 1: Independent with HEP.  ongoing New     Long Term Goals - Time Frame for Long Term Goals : 5 weeks  Goals Current/ Discharge status Status   Long Term Goal 1: Improve Lt knee ROM by >/= 5 deg to increase ease with ambulation and stair negotiations.    AROM LLE (degrees)  L Knee Flexion (0-145): 123  L Knee Extension (0): 0   AROM RLE (degrees)  R Knee Flexion (0-145): 132  R Knee Extension (0): 0    New   Long Term Goal 2: Reduce roosevelt knee pain to </= 3/10 with all activity. 3-10/10 New   Long Term Goal 3: Improve roosevelt LE strength to >/= 4+/5 to improve stability with standing and walking. Strength LLE  L Hip Flexion: 5/5  L Hip Extension: 4-/5  L Hip ABduction: 4-/5  L Knee Flexion: 5/5  L Knee Extension: 5/5  L Ankle Dorsiflexion: 5/5  Strength RLE  R Hip Flexion: 5/5  R Hip Extension: 4/5  R Hip ABduction: 4/5  R Knee Flexion: 5/5  R Knee Extension: 5/5  R Ankle Dorsiflexion: 5/5    New   Long Term Goal 4: LEFS >/= 30/80 to improve pt's QOL. 12 New       Body Structures, Functions, Activity Limitations Requiring Skilled Therapeutic Intervention: Decreased functional mobility , Decreased ROM, Decreased strength, Increased pain, Decreased balance  Assessment: Pt

## 2024-06-12 ENCOUNTER — HOSPITAL ENCOUNTER (OUTPATIENT)
Dept: PHYSICAL THERAPY | Age: 44
Setting detail: THERAPIES SERIES
Discharge: HOME | End: 2024-06-12
Attending: ORTHOPAEDIC SURGERY
Payer: COMMERCIAL

## 2024-06-12 PROCEDURE — 97110 THERAPEUTIC EXERCISES: CPT

## 2024-06-12 ASSESSMENT — PAIN DESCRIPTION - DESCRIPTORS: DESCRIPTORS: PRESSURE

## 2024-06-12 ASSESSMENT — PAIN DESCRIPTION - LOCATION: LOCATION: KNEE

## 2024-06-12 ASSESSMENT — PAIN SCALES - GENERAL: PAINLEVEL_OUTOF10: 8

## 2024-06-12 ASSESSMENT — PAIN DESCRIPTION - ORIENTATION: ORIENTATION: LEFT
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
## 2024-06-17 ENCOUNTER — HOSPITAL ENCOUNTER (OUTPATIENT)
Dept: PHYSICAL THERAPY | Age: 44
Setting detail: THERAPIES SERIES
Discharge: HOME | End: 2024-06-17
Attending: ORTHOPAEDIC SURGERY
Payer: COMMERCIAL

## 2024-06-17 PROCEDURE — 97110 THERAPEUTIC EXERCISES: CPT

## 2024-06-17 ASSESSMENT — PAIN SCALES - GENERAL: PAINLEVEL_OUTOF10: 10

## 2024-06-17 ASSESSMENT — PAIN DESCRIPTION - LOCATION: LOCATION: KNEE

## 2024-06-17 NOTE — PROGRESS NOTES
[facility]  Outpatient Physical Therapy   Treatment Note        Date: 2024  Patient: Christina Olivier  : 1980   Confirmed: Yes  MRN: [id number]  Referring Provider: Suman Mittal MD      Medical Diagnosis: Patellar tendinitis of both knees [M76.51, M76.52]      Treatment Diagnosis: Darius knee pain    Visit Information:  Ins[facility]: [facility] / Plan: [employer] [place] MEDICAID / Product Type: *No Product type* /   PT Visit Information  PT Insurance Information: [employer]  Total # of Visits Approved:  (40 units 24-24)  Total # of Visits to Date: 3  Plan of Care/Certification Expiration Date: 24  No Show: 0  Progress Note Due Date: 24  Canceled Appointment: 0  Progress Note Counter: 2/10  ( 5/40 units 24-24)    Subjective Information:  Subjective: Patient has been having increased pain since Saturday.  HEP Compliance:  [] Good [] Fair [] Poor [] Reports not doing due to:               Pain Screening  Patient Currently in Pain: Yes  Pain Assessment: 0-10  Pain Level: 10  Pain Location: Knee    Treatment:  Exercises:  Exercises  Exercise 2: SLR x 10 darius, seated LAQ 5 sec hold x 10  Exercise 3: Sidelying hip abd x 10 darius, clamshell with RTB 3 sec hold x 10  Exercise 4: Prone hip ext x10 darius, prone knee flexion x 10  Exercise 5: Hams str 30 sec x 3, bilateral  Exercise 11: anterior shin stretch 10 sec hold x 3  Exercise 20: HEP: continue with current       Manual:           Modalities:  Cryotherapy (CPT 90946)  Patient Position: Supine  Number Minutes Cryotherapy: 10 minutes  Cryotherapy location: Left, Knee (lower leg)  Cryotherapy specified location: left knee and lower leg  Post treatment skin assessment: Intact       *Indicates exercise, modality, or manual techniques to be initiated when appropriate    Objective Measures:

## 2024-06-20 ENCOUNTER — HOSPITAL ENCOUNTER (OUTPATIENT)
Dept: PHYSICAL THERAPY | Age: 44
Setting detail: THERAPIES SERIES
Discharge: HOME | End: 2024-06-20
Attending: ORTHOPAEDIC SURGERY
Payer: COMMERCIAL

## 2024-06-20 NOTE — PROGRESS NOTES
Therapy                            Cancellation/No-show Note    Date: 2024  Patient: Christina Olivier (43 y.o. female)  : 1980  MRN:  [id number]  Referring Physician: Suman Mittal MD    Medical Diagnosis: Patellar tendinitis of both knees [M76.51, M76.52]      Visit Information:  [facility] / Plan: [employer] [place] MEDICAID / Product Type: *No Product type* /   Visits to Date: 3   No Show/Cancelled Appts: 0 / 1      For today's appointment patient:  [x]  Cancelled  []  Rescheduled appointment  []  No-show   []  Called pt to remind of next appointment     Reason given by patient:  []  Patient ill  []  Conflicting appointment  []  No transportation    []  Conflict with work  [x]  No reason given  []  Other:      [x] Pt has future appointments scheduled, no follow up needed  [] Pt requests to be on hold.    Reason:   If > 2 weeks please discuss with therapist.  [] Therapist to call pt for follow up     Comments:       Signature: Electronically signed by Lela Page PT on 24 at 3:23 PM EDT

## 2024-06-24 ENCOUNTER — HOSPITAL ENCOUNTER (OUTPATIENT)
Dept: PHYSICAL THERAPY | Age: 44
Setting detail: THERAPIES SERIES
Discharge: HOME | End: 2024-06-24
Attending: ORTHOPAEDIC SURGERY
Payer: COMMERCIAL

## 2024-06-24 NOTE — PROGRESS NOTES
Therapy                            Cancellation/No-show Note    Date: 2024  Patient: Christina Olivier (43 y.o. female)  : 1980  MRN:  [id number]  Referring Physician: Suman Mittal MD    Medical Diagnosis: Patellar tendinitis of both knees [M76.51, M76.52]      Visit Information:  [facility] / Plan: [employer] [place] MEDICAID / Product Type: *No Product type* /   Visits to Date: 3   No Show/Cancelled Appts:       For today's appointment patient:  []  Cancelled  []  Rescheduled appointment  [x]  No-show   [x]  Called pt to remind of next appointment ( Aleta left a message on voicemail)     Reason given by patient:  []  Patient ill  []  Conflicting appointment  []  No transportation    []  Conflict with work  []  No reason given  []  Other:      [] Pt has future appointments scheduled, no follow up needed  [] Pt requests to be on hold.    Reason:   If > 2 weeks please discuss with therapist.  [] Therapist to call pt for follow up     Comments:       Signature: Electronically signed by Antonella Smith PT on 24 at 4:20 PM EDT

## 2024-06-27 ENCOUNTER — HOSPITAL ENCOUNTER (OUTPATIENT)
Dept: PHYSICAL THERAPY | Age: 44
Setting detail: THERAPIES SERIES
Discharge: HOME | End: 2024-06-27
Attending: ORTHOPAEDIC SURGERY
Payer: COMMERCIAL

## 2024-06-27 PROCEDURE — 97110 THERAPEUTIC EXERCISES: CPT

## 2024-06-27 ASSESSMENT — PAIN SCALES - GENERAL: PAINLEVEL_OUTOF10: 8

## 2024-06-27 ASSESSMENT — PAIN DESCRIPTION - ORIENTATION: ORIENTATION: RIGHT

## 2024-06-27 NOTE — PROGRESS NOTES
[facility]  Outpatient Physical Therapy    Treatment Note        Date: 2024  Patient: Christina Olivier  : 1980   Confirmed: Yes  MRN: [id number]  Referring Provider: Suman Mittal MD    Medical Diagnosis: Patellar tendinitis of both knees [M76.51, M76.52]       Treatment Diagnosis: Roosevelt knee pain    Visit Information:  Insurance: Payor: [employer] / Plan: [employer] [place] MED[place] / Product Type: *No Product type* /   PT Visit Information  PT Insurance Information: [employer]  Total # of Visits Approved:  (40 units 24-24)  Total # of Visits to Date: 4  Plan of Care/Certification Expiration Date: 24  No Show: 1  Progress Note Due Date: 24  Canceled Appointment: 1  Progress Note Counter: 3/10  (8/40 units 24-24)    Subjective Information:  Subjective: Pt reports falling due to both knees giving out which is why she missed Monday's appt.  Hit Rt shin and Rt elbow when falling.  Pt reports trying HEP but is limited by pain.  Saúl from [place] present to interpret.  HEP Compliance:  [x] Good [] Fair [] Poor [] Reports not doing due to:      Pain Screening  Patient Currently in Pain: Yes  Pain Assessment: 0-10  Pain Level: 8  Pain Location:  (Shin)  Pain Orientation: Right    Treatment:  Exercises:  Exercises  Exercise 1: Bike seat L3 5'  Exercise 2: SLR x15 roosevelt; 4 wahy hip nv*  Exercise 3: Sidelying hip abd x 15 roosevelt  Exercise 4: Prone hip ext x15 roosevelt, prone knee flexion x 15 roosevelt  Exercise 7: Step ups 4\" F/L x 15 roosevelt  Exercise 8: Step downs 2\" box x15 roosevelt - too much pain with 4\" box  Exercise 9: Monster walks with YTB F/L/R x2 laps ea  Exercise 10: Mini squats x10  Exercise 12: Prone quad str with strap 30s x2 roosevelt       Objective Measures:     ROM: [] NT  [x] ROM measurements:     AROM LLE (degrees)  L Knee Flexion (0-145): 140   AROM RLE (degrees)  R Knee Flexion (0-145): 145     Assessment:   Body Structures, Functions, Activity Limitations Requiring Skilled

## 2024-07-01 ENCOUNTER — HOSPITAL ENCOUNTER (OUTPATIENT)
Dept: PHYSICAL THERAPY | Age: 44
Setting detail: THERAPIES SERIES
Discharge: HOME | End: 2024-07-01
Attending: ORTHOPAEDIC SURGERY
Payer: COMMERCIAL

## 2024-07-01 NOTE — PROGRESS NOTES
Therapy                            Cancellation/No-show Note    Date: 2024  Patient: Christina Olivier (43 y.o. female)  : 1980  MRN:  [id number]  Referring Physician: Suman Mittal MD    Medical Diagnosis: Patellar tendinitis of both knees [M76.51, M76.52]      Visit Information:  [facility] / Plan: [employer] [place] MEDICAID / Product Type: *No Product type* /   Visits to Date: 4   No Show/Cancelled Appts:       For today's appointment patient:  [x]  Cancelled  []  Rescheduled appointment  []  No-show   []  Called pt to remind of next appointment     Reason given by patient:  []  Patient ill  []  Conflicting appointment  []  No transportation    []  Conflict with work  []  No reason given  [x]  Other:  Family emergency    [x] Pt has future appointments scheduled, no follow up needed  [] Pt requests to be on hold.    Reason:   If > 2 weeks please discuss with therapist.  [] Therapist to call pt for follow up     Comments:       Signature: Electronically signed by Angi Grullon PTA on 24 at 2:54 PM EDT

## 2024-07-03 ENCOUNTER — HOSPITAL ENCOUNTER (OUTPATIENT)
Dept: PHYSICAL THERAPY | Age: 44
Setting detail: THERAPIES SERIES
Discharge: HOME | End: 2024-07-03
Attending: ORTHOPAEDIC SURGERY
Payer: COMMERCIAL

## 2024-07-03 PROCEDURE — 97110 THERAPEUTIC EXERCISES: CPT

## 2024-07-03 ASSESSMENT — PAIN DESCRIPTION - ORIENTATION: ORIENTATION: RIGHT;LEFT

## 2024-07-03 ASSESSMENT — PAIN SCALES - GENERAL: PAINLEVEL_OUTOF10: 5

## 2024-07-03 ASSESSMENT — PAIN DESCRIPTION - LOCATION: LOCATION: KNEE;ELBOW

## 2024-07-03 ASSESSMENT — PAIN DESCRIPTION - DESCRIPTORS: DESCRIPTORS: STABBING;SHARP

## 2024-07-03 NOTE — PROGRESS NOTES
Requiring Skilled Therapeutic Intervention: Decreased functional mobility , Decreased ROM, Decreased strength, Increased pain, Decreased balance  Assessment: Pt continues to experience some pain in darius knees and elbow from recent fall. Able to tolerate all exercises with mild increase in pain when performing, no increased pain post session. Minimal progressions made secondary to discomfort. Pt able to complete step ups with 6\" step vs previous 4\" and also able to complete x3 laps of monster walks vs previous 2. Occ cues provided for maintaining good technique and body mechanics for max benefit. Educated pt on importance of performing HEP including stretching. Recommend pt continue on with current HEP plus 3 way hip for continued progress toward all goals.  Treatment Diagnosis: Darius knee pain  Therapy Prognosis: Good          Post-Pain Assessment:       Pain Rating (0-10 pain scale):  4 /10   Location and pain description same as pre-treatment unless indicated.   Action: [] NA   [x] Perform HEP  [x] Meds as prescribed  [x] Modalities as prescribed   [] Call Physician     GOALS   Patient Goal(s): Patient Goals : Walk well and improve pain    Short Term Goals Completed by 2 weeks Goal Status   STG 1 Independent with HEP. In progress     Long Term Goals Completed by 5 weeks Goal Status   LTG 1 Improve Lt knee ROM by >/= 5 deg to increase ease with ambulation and stair negotiations. In progress   LTG 2 Reduce darius knee pain to </= 3/10 with all activity. In progress   LTG 3 Improve darius LE strength to >/= 4+/5 to improve stability with standing and walking. In progress   LTG 4 LEFS >/= 30/80 to improve pt's QOL. In progress       Plan:  Frequency/Duration:  Plan  Plan Frequency: 2  Plan weeks: 5  Current Treatment Recommendations: Strengthening, ROM, Functional mobility training, Transfer training, Gait training, Stair training, Neuromuscular re-education, Manual, Home exercise program, Safety education & training,

## 2024-07-15 ENCOUNTER — HOSPITAL ENCOUNTER (OUTPATIENT)
Dept: PHYSICAL THERAPY | Age: 44
Setting detail: THERAPIES SERIES
Discharge: HOME | End: 2024-07-15
Attending: ORTHOPAEDIC SURGERY
Payer: COMMERCIAL

## 2024-07-15 NOTE — PROGRESS NOTES
Therapy                            Cancellation/No-show Note    Date: 07/15/2024  Patient: Christina Olivier (43 y.o. female)  : 1980  MRN:  [id number]  Referring Physician: Suman Mittal MD    Medical Diagnosis: Patellar tendinitis of both knees [M76.51, M76.52]      Visit Information:  Insurance: [facility] / [facility] [place] [facility] / Product Type: *No Product type* /   Visits to Date: 5   No Show/Cancelled Appts:       For today's appointment patient:  []  Cancelled  []  Rescheduled appointment  [x]  No-show   [x]  Called pt advise of D/C due to lack of attendance.      Reason given by patient:  []  Patient ill  []  Conflicting appointment  []  No transportation    []  Conflict with work  []  No reason given  []  Other:      [x] D/C     Comments:       Signature: Electronically signed by Angi Grullon PTA on 7/15/24 at 3:46 PM EDT

## 2024-07-15 NOTE — THERAPY DISCHARGE
showed on 4 occasions. Patient has not responded to any attempts made and will be discharged due to facility's attendance policy.         PLAN: [x] D/C  Frequency/Duration:  Additional Comments: D/C                           Patient Status:[] Continue/ Initiate plan of Care     [x] Discharge PT.  Recommend pt continue with HEP.      [] Additional visits requested, Please re-certify for additional visits:     [] Hold   Obj info by:  Electronically signed by Angi Grullon PTA on 7/15/2024 at 3:49 PM    Signature:  Electronically signed by Chastity Salas PT on 7/16/2024 at 9:29 AM          If you have any questions or concerns, please don't hesitate to call.  Thank you for your referral.

## 2024-07-18 ENCOUNTER — APPOINTMENT (OUTPATIENT)
Dept: PHYSICAL THERAPY | Age: 44
End: 2024-07-18
Attending: ORTHOPAEDIC SURGERY
Payer: COMMERCIAL

## 2024-07-20 DIAGNOSIS — R07.9 CHEST PAIN, UNSPECIFIED TYPE: ICD-10-CM

## 2024-07-22 RX ORDER — NITROGLYCERIN 0.4 MG/1
0.4 TABLET SUBLINGUAL EVERY 5 MIN PRN
Qty: 25 TABLET | Refills: 3 | Status: SHIPPED | OUTPATIENT
Start: 2024-07-22

## 2024-07-22 NOTE — TELEPHONE ENCOUNTER
Requesting medication refill. Please approve or deny this request.    Rx requested:  Requested Prescriptions     Pending Prescriptions Disp Refills    nitroGLYCERIN (NITROSTAT) 0.4 MG SL tablet [Pharmacy Med Name: NITROGLYCERIN 0.4 MG SUBL 0.4 Tablet] 25 tablet 3     Sig: PLACE 1 TABLET UNDER THE TONGUE EVERY 5 MINUTES AS NEEDED FOR CHEST PAIN         Last Office Visit:   5/25/2023      Next Visit Date:  Future Appointments   Date Time Provider Department Center   10/10/2024  3:30 PM Holnehemiah, DO [facility]               Last refill 03/30/2024. Please approve or deny.

## 2024-07-29 RX ORDER — ISOSORBIDE MONONITRATE 60 MG/1
60 TABLET, EXTENDED RELEASE ORAL DAILY
Qty: 90 TABLET | Refills: 3 | Status: SHIPPED | OUTPATIENT
Start: 2024-07-29

## 2024-07-29 NOTE — TELEPHONE ENCOUNTER
Requesting medication refill. Please approve or deny this request.    Rx requested:  Requested Prescriptions     Pending Prescriptions Disp Refills    isosorbide mononitrate (IMDUR) 60 MG extended release tablet [Pharmacy Med Name: ISOSORBID MONONITRATE ER 60 60 Tablet] 30 tablet 3     Sig: TAKE 1 TABLET BY MOUTH DAILY         Last Office Visit:   4/11/2024      Next Visit Date:  Future Appointments   Date Time Provider Department Center   10/10/2024  3:30 PM Holiday, DO VELIA [facility]               Last refill 04/11/2024. Please approve or deny.

## 2024-07-30 ENCOUNTER — HOSPITAL ENCOUNTER (OUTPATIENT)
Dept: SLEEP CENTER | Age: 44
Discharge: HOME | End: 2024-08-01
Payer: COMMERCIAL

## 2024-07-30 PROCEDURE — 95810 POLYSOM 6/> YRS 4/> PARAM: CPT

## 2024-08-29 ENCOUNTER — HOSPITAL ENCOUNTER (OUTPATIENT)
Dept: GENERAL RADIOLOGY | Age: 44
Discharge: HOME | End: 2024-08-31
Attending: INTERNAL MEDICINE
Payer: COMMERCIAL

## 2024-08-29 ENCOUNTER — OFFICE VISIT (OUTPATIENT)
Dept: PULMONOLOGY | Age: 44
End: 2024-08-29
Payer: COMMERCIAL

## 2024-08-29 VITALS
SYSTOLIC BLOOD PRESSURE: 136 MMHG | HEIGHT: 67 IN | BODY MASS INDEX: 26.06 KG/M2 | WEIGHT: 166 LBS | HEART RATE: 57 BPM | DIASTOLIC BLOOD PRESSURE: 84 MMHG | OXYGEN SATURATION: 100 %

## 2024-08-29 DIAGNOSIS — G47.33 OSA (OBSTRUCTIVE SLEEP APNEA): Primary | ICD-10-CM

## 2024-08-29 DIAGNOSIS — R06.02 SHORTNESS OF BREATH: ICD-10-CM

## 2024-08-29 PROCEDURE — 71046 X-RAY EXAM CHEST 2 VIEWS: CPT

## 2024-08-29 PROCEDURE — 1036F TOBACCO NON-USER: CPT | Performed by: INTERNAL MEDICINE

## 2024-08-29 PROCEDURE — 99205 OFFICE O/P NEW HI 60 MIN: CPT | Performed by: INTERNAL MEDICINE

## 2024-08-29 PROCEDURE — G8419 CALC BMI OUT NRM PARAM NOF/U: HCPCS | Performed by: INTERNAL MEDICINE

## 2024-08-29 PROCEDURE — G8427 DOCREV CUR MEDS BY ELIG CLIN: HCPCS | Performed by: INTERNAL MEDICINE

## 2024-08-29 NOTE — PROGRESS NOTES
Subjective:             Christina Olivier is a 43 y.o. female who complains today of:     Chief Complaint   Patient presents with    New Patient     NANDO       HPI  She had PSG done on 7/30/24  AHI 10.5 hour. AHI REM 35   She was  using CPAP with  10  centimeters of H2O with heated humidity in past she is not using since last 2 yrs.   She said it stop working and she through it away.   She is not sure about  snoring ,  c/o daytime  tiredness.  She wakes up with gasping for air.   C/o wakes up frequently during sleep .   complaint of morning headache.  She does not have restful sleep.  She does not take daily naps.  She does not fall asleep while watching TV.  She does not have a complaint of sleepiness while driving.    She said she is having shortness of breath , she said she feels dizzy at times.    Chest hurt with taking deep breath   No cough with sputum   No wheezing   C/o chest pain and neck pain. Negative cardiac workup  in aug 2024   No smoking     She had bariatric surgery at [facility] 12/21/20 lost 123 lbs since surgery      Allergies:  Dye [iodides] and Red dye #40 (allura red)  Past Medical History:   Diagnosis Date    Allergic rhinitis     Depressive disorder     Dyspnea on exertion 05/25/2023    History of bariatric surgery 05/25/2023    Hypothyroidism     Inverted nipple     bilateral - normal for her    Migraine     Obesity     NANDO (obstructive sleep apnea)     Osteoarthritis     Precordial pain 10/20/2022    PTSD (post-traumatic stress disorder)      Past Surgical History:   Procedure Laterality Date    GASTRIC BYPASS SURGERY  2020    HYSTERECTOMY (CERVIX STATUS UNKNOWN)      Partial-still has 1 ovary    JOINT REPLACEMENT Left     OVARY REMOVAL      only removed 1 ovary     Family History   Problem Relation Age of Onset    Breast Cancer Sister         doesn't know age or laterality     Social History     Socioeconomic History    Marital status: Single     Spouse name: Not on file

## 2024-09-12 ENCOUNTER — HOSPITAL ENCOUNTER (OUTPATIENT)
Dept: PULMONOLOGY | Age: 44
Discharge: HOME | End: 2024-09-12
Attending: INTERNAL MEDICINE
Payer: COMMERCIAL

## 2024-09-12 DIAGNOSIS — R06.02 SHORTNESS OF BREATH: ICD-10-CM

## 2024-09-12 PROCEDURE — 94729 DIFFUSING CAPACITY: CPT

## 2024-09-12 PROCEDURE — 94726 PLETHYSMOGRAPHY LUNG VOLUMES: CPT

## 2024-09-12 PROCEDURE — 94060 EVALUATION OF WHEEZING: CPT

## 2024-09-12 PROCEDURE — 6360000002 HC RX W HCPCS: Performed by: INTERNAL MEDICINE

## 2024-09-12 RX ORDER — ALBUTEROL SULFATE 0.83 MG/ML
2.5 SOLUTION RESPIRATORY (INHALATION) EVERY 6 HOURS PRN
Status: DISCONTINUED | OUTPATIENT
Start: 2024-09-12 | End: 2024-09-13 | Stop reason: HOSPADM

## 2024-09-12 RX ADMIN — ALBUTEROL SULFATE 2.5 MG: 2.5 SOLUTION RESPIRATORY (INHALATION) at 08:39

## 2024-09-18 ENCOUNTER — HOSPITAL ENCOUNTER (EMERGENCY)
Age: 44
Discharge: HOME | End: 2024-09-18
Payer: COMMERCIAL

## 2024-09-18 VITALS
BODY MASS INDEX: 26.24 KG/M2 | SYSTOLIC BLOOD PRESSURE: 163 MMHG | HEART RATE: 53 BPM | WEIGHT: 167.2 LBS | HEIGHT: 67 IN | DIASTOLIC BLOOD PRESSURE: 84 MMHG | OXYGEN SATURATION: 100 % | TEMPERATURE: 98.1 F | RESPIRATION RATE: 16 BRPM

## 2024-09-18 DIAGNOSIS — L03.213 PERIORBITAL CELLULITIS OF LEFT EYE: Primary | ICD-10-CM

## 2024-09-18 PROCEDURE — 99283 EMERGENCY DEPT VISIT LOW MDM: CPT

## 2024-09-18 RX ORDER — IBUPROFEN 600 MG/1
600 TABLET, FILM COATED ORAL EVERY 6 HOURS PRN
Qty: 10 TABLET | Refills: 0 | Status: SHIPPED | OUTPATIENT
Start: 2024-09-18

## 2024-09-18 RX ORDER — SULFAMETHOXAZOLE/TRIMETHOPRIM 800-160 MG
1 TABLET ORAL 2 TIMES DAILY
Qty: 10 TABLET | Refills: 0 | Status: SHIPPED | OUTPATIENT
Start: 2024-09-18 | End: 2024-09-23

## 2024-09-18 ASSESSMENT — PAIN DESCRIPTION - ORIENTATION: ORIENTATION: LEFT

## 2024-09-18 ASSESSMENT — PAIN DESCRIPTION - DESCRIPTORS: DESCRIPTORS: ACHING

## 2024-09-18 ASSESSMENT — VISUAL ACUITY: OU: 1

## 2024-09-18 ASSESSMENT — LIFESTYLE VARIABLES
HOW OFTEN DO YOU HAVE A DRINK CONTAINING ALCOHOL: MONTHLY OR LESS
HOW MANY STANDARD DRINKS CONTAINING ALCOHOL DO YOU HAVE ON A TYPICAL DAY: 1 OR 2

## 2024-09-18 ASSESSMENT — ENCOUNTER SYMPTOMS
RESPIRATORY NEGATIVE: 1
PHOTOPHOBIA: 0
EYE PAIN: 0
GASTROINTESTINAL NEGATIVE: 1
EYE DISCHARGE: 0
EYE ITCHING: 0
EYE REDNESS: 1

## 2024-09-18 ASSESSMENT — PAIN SCALES - GENERAL: PAINLEVEL_OUTOF10: 10

## 2024-09-18 ASSESSMENT — PAIN DESCRIPTION - PAIN TYPE: TYPE: ACUTE PAIN

## 2024-09-18 ASSESSMENT — PAIN - FUNCTIONAL ASSESSMENT: PAIN_FUNCTIONAL_ASSESSMENT: 0-10

## 2024-09-18 ASSESSMENT — PAIN DESCRIPTION - LOCATION: LOCATION: EYE

## 2024-09-26 ENCOUNTER — OFFICE VISIT (OUTPATIENT)
Dept: PULMONOLOGY | Age: 44
End: 2024-09-26
Payer: COMMERCIAL

## 2024-09-26 VITALS
SYSTOLIC BLOOD PRESSURE: 128 MMHG | OXYGEN SATURATION: 97 % | BODY MASS INDEX: 26.63 KG/M2 | HEART RATE: 55 BPM | WEIGHT: 170 LBS | DIASTOLIC BLOOD PRESSURE: 70 MMHG

## 2024-09-26 DIAGNOSIS — G47.33 OSA (OBSTRUCTIVE SLEEP APNEA): ICD-10-CM

## 2024-09-26 DIAGNOSIS — R06.02 SHORTNESS OF BREATH: Primary | ICD-10-CM

## 2024-09-26 PROCEDURE — G8419 CALC BMI OUT NRM PARAM NOF/U: HCPCS | Performed by: INTERNAL MEDICINE

## 2024-09-26 PROCEDURE — 99215 OFFICE O/P EST HI 40 MIN: CPT | Performed by: INTERNAL MEDICINE

## 2024-09-26 PROCEDURE — 1036F TOBACCO NON-USER: CPT | Performed by: INTERNAL MEDICINE

## 2024-09-26 PROCEDURE — G8427 DOCREV CUR MEDS BY ELIG CLIN: HCPCS | Performed by: INTERNAL MEDICINE

## 2024-09-26 RX ORDER — PANTOPRAZOLE SODIUM 40 MG/1
TABLET, DELAYED RELEASE ORAL
COMMUNITY
Start: 2024-09-03

## 2024-09-26 ASSESSMENT — ENCOUNTER SYMPTOMS
EYE ITCHING: 0
EYE DISCHARGE: 0
ABDOMINAL PAIN: 0
TROUBLE SWALLOWING: 0
WHEEZING: 0
VOICE CHANGE: 0
VOMITING: 0
COUGH: 0
DIARRHEA: 0
SINUS PRESSURE: 0
RHINORRHEA: 0
SHORTNESS OF BREATH: 1
CHEST TIGHTNESS: 0
SORE THROAT: 0
NAUSEA: 0

## 2024-10-03 ENCOUNTER — HOSPITAL ENCOUNTER (OUTPATIENT)
Dept: CT IMAGING | Age: 44
Discharge: HOME | End: 2024-10-05
Attending: INTERNAL MEDICINE
Payer: COMMERCIAL

## 2024-10-03 DIAGNOSIS — R06.02 SHORTNESS OF BREATH: ICD-10-CM

## 2024-10-03 PROCEDURE — 71250 CT THORAX DX C-: CPT

## 2024-10-10 ENCOUNTER — OFFICE VISIT (OUTPATIENT)
Dept: CARDIOLOGY CLINIC | Age: 44
End: 2024-10-10
Payer: COMMERCIAL

## 2024-10-10 VITALS
HEART RATE: 66 BPM | WEIGHT: 169 LBS | DIASTOLIC BLOOD PRESSURE: 82 MMHG | SYSTOLIC BLOOD PRESSURE: 134 MMHG | BODY MASS INDEX: 26.47 KG/M2

## 2024-10-10 DIAGNOSIS — R07.2 PRECORDIAL PAIN: Primary | ICD-10-CM

## 2024-10-10 DIAGNOSIS — R06.09 DYSPNEA ON EXERTION: ICD-10-CM

## 2024-10-10 DIAGNOSIS — R07.9 CHEST PAIN, UNSPECIFIED TYPE: ICD-10-CM

## 2024-10-10 DIAGNOSIS — R00.2 PALPITATIONS: ICD-10-CM

## 2024-10-10 DIAGNOSIS — I10 PRIMARY HYPERTENSION: ICD-10-CM

## 2024-10-10 PROCEDURE — G8484 FLU IMMUNIZE NO ADMIN: HCPCS | Performed by: INTERNAL MEDICINE

## 2024-10-10 PROCEDURE — 3075F SYST BP GE 130 - 139MM HG: CPT | Performed by: INTERNAL MEDICINE

## 2024-10-10 PROCEDURE — 93000 ELECTROCARDIOGRAM COMPLETE: CPT | Performed by: INTERNAL MEDICINE

## 2024-10-10 PROCEDURE — G8427 DOCREV CUR MEDS BY ELIG CLIN: HCPCS | Performed by: INTERNAL MEDICINE

## 2024-10-10 PROCEDURE — 99214 OFFICE O/P EST MOD 30 MIN: CPT | Performed by: INTERNAL MEDICINE

## 2024-10-10 PROCEDURE — 1036F TOBACCO NON-USER: CPT | Performed by: INTERNAL MEDICINE

## 2024-10-10 PROCEDURE — G8419 CALC BMI OUT NRM PARAM NOF/U: HCPCS | Performed by: INTERNAL MEDICINE

## 2024-10-10 PROCEDURE — 3079F DIAST BP 80-89 MM HG: CPT | Performed by: INTERNAL MEDICINE

## 2024-10-10 RX ORDER — NITROGLYCERIN 0.4 MG/1
0.4 TABLET SUBLINGUAL EVERY 5 MIN PRN
Qty: 25 TABLET | Refills: 0 | Status: SHIPPED | OUTPATIENT
Start: 2024-10-10

## 2024-10-10 RX ORDER — MULTIVITAMIN
TABLET ORAL
COMMUNITY
Start: 2024-10-03

## 2024-10-10 RX ORDER — LISINOPRIL 20 MG/1
20 TABLET ORAL DAILY
COMMUNITY

## 2024-10-10 NOTE — PROGRESS NOTES
cardiovascular risk analysis. I have discussed the appropriate diet. The need for lifelong compliance in order to reduce risk is stressed. A regular exercise program is recommended to help achieve and maintain normal body weight, fitness and improve lipid balance.     Details of medical condition explained and patient was warned about adverse consequences of uncontrolled medical conditions and possible side effects of prescribed medications.     Patient was advised and encouraged to check blood pressure at home or at a pharmacy, maintain a logbook, and also call us back if blood pressures are above or below the target ranges. Patient clearly understands and agrees to these instructions.     Patient was advised to go to the ER if he starts experiencing adverse effects of the medications.patient was instructed to call us back or go to nearby emergency room immediately if symptoms get worse or do not improve.     Counseling:The patient has been advised to contact us if they experience chest pain, palpitations, progressive SOB, orthopnea, paroxysmal nocturnal dyspnea, or progressive edema.

## 2024-10-11 ENCOUNTER — OFFICE VISIT (OUTPATIENT)
Dept: BARIATRICS/WEIGHT MGMT | Age: 44
End: 2024-10-11
Payer: COMMERCIAL

## 2024-10-11 VITALS
DIASTOLIC BLOOD PRESSURE: 78 MMHG | SYSTOLIC BLOOD PRESSURE: 116 MMHG | BODY MASS INDEX: 26.36 KG/M2 | WEIGHT: 164 LBS | HEIGHT: 66 IN

## 2024-10-11 DIAGNOSIS — Z98.84 S/P GASTRIC BYPASS: Primary | ICD-10-CM

## 2024-10-11 DIAGNOSIS — K59.09 OTHER CONSTIPATION: ICD-10-CM

## 2024-10-11 LAB
ALBUMIN SERPL-MCNC: 4.4 G/DL (ref 3.5–4.6)
ERYTHROCYTE [DISTWIDTH] IN BLOOD BY AUTOMATED COUNT: 13.1 % (ref 11.5–14.5)
HCT VFR BLD AUTO: 35.8 % (ref 37–47)
HGB BLD-MCNC: 12.1 G/DL (ref 12–16)
MCH RBC QN AUTO: 31.6 PG (ref 27–31.3)
MCHC RBC AUTO-ENTMCNC: 33.8 % (ref 33–37)
MCV RBC AUTO: 93.5 FL (ref 79.4–94.8)
PLATELET # BLD AUTO: 292 K/UL (ref 130–400)
PTH-INTACT SERPL-MCNC: 73 PG/ML (ref 15–65)
RBC # BLD AUTO: 3.83 M/UL (ref 4.2–5.4)
WBC # BLD AUTO: 5.7 K/UL (ref 4.8–10.8)

## 2024-10-11 PROCEDURE — 1036F TOBACCO NON-USER: CPT | Performed by: SURGERY

## 2024-10-11 PROCEDURE — G8484 FLU IMMUNIZE NO ADMIN: HCPCS | Performed by: SURGERY

## 2024-10-11 PROCEDURE — 99205 OFFICE O/P NEW HI 60 MIN: CPT | Performed by: SURGERY

## 2024-10-11 PROCEDURE — G8427 DOCREV CUR MEDS BY ELIG CLIN: HCPCS | Performed by: SURGERY

## 2024-10-11 PROCEDURE — G8419 CALC BMI OUT NRM PARAM NOF/U: HCPCS | Performed by: SURGERY

## 2024-10-11 RX ORDER — POLYETHYLENE GLYCOL 3350, SODIUM SULFATE ANHYDROUS, SODIUM BICARBONATE, SODIUM CHLORIDE, POTASSIUM CHLORIDE 236; 22.74; 6.74; 5.86; 2.97 G/4L; G/4L; G/4L; G/4L; G/4L
4 POWDER, FOR SOLUTION ORAL ONCE
Qty: 4000 ML | Refills: 0 | Status: SHIPPED | OUTPATIENT
Start: 2024-10-11 | End: 2024-10-11

## 2024-10-11 NOTE — PATIENT INSTRUCTIONS
Completa la preparación intestinal    2. Ponga rajni cucharada  de Metamucil en 20 oz. De agua y beber diariamente.  Consuma al menos 64 oz de agua al día.    3. Obtenga laboratorios bariátricos para montoya revisión.  Recomendamos encarecidamente un seguimiento y revisión al menos anual de los siguientes laboratorios:    nivel B12   nivel de tiamina   PTH y calcio ionizado   CMP   Prealbúmina   Nivel de vitamina A (si hay anemia a pesar de los niveles normales de yas)  Nivel de cobre (si hay anemia a pesar de los niveles normales de yas)  Nivel de zinc   Nivel de vitamina D   HgA1c   CBC   Y estudios de yas      Be aware that prolonged deficiencies in vitamin B12 and/or Thiamine (vitamin B1) can cause irreversible dementia and other neurologic damage.  As a patient, it is also your responsibility to review these labs yearly with your PCP, Bariatric Surgeon, or other provider.      LAS CUATRO GRANDES DIRECTRICES:  1. ¡Cuenta calorías! Las personas que cuentan calorías comen menos. Utilice el plato diario u otras aplicaciones para montoya teléfono inteligente o computadora. Cuanto más cuentes, más experto te volverás y será cada vez más fácil. Si está tratando de perder peso y hacer mucho ejercicio, mantenga las calorías entre mil y 1200 por día. Si no hace ejercicio de manera marium, intente limitarlo a alrededor de 800 por día.    2. Separar líquidos y sólidos. Espere de 30 minutos a rajni hora después de comer antes de beber líquidos. Jerome reducirá la cantidad total de alimentos que ingiere en la comida.    3. ¡Haz ejercicio! Cualquier cantidad ayuda mucho.    4. ¡Duerme! Trate de dormir 7 o más horas por noche. Si tiene apnea obstructiva del sueño, definitivamente use montoya máquina CPAP.

## 2024-10-11 NOTE — PROGRESS NOTES
Surgery Consultation    Patient Name:  :  Hospital Day:   Christina Olivier 1980 [unfilled]     Date of Service: 10/11/2024    Subjective:      History of Present Illness:    Christina Olivier  is a 43 y.o. female referred by Lupe Sales APRN - CNP  for intermittently severe abdominal cramping. Christina is a few years status post laparoscopic gastric bypass at [facility].  She has lost all her excess weight and kept it off.    Christina reports intermittently severe cramping in her right upper quadrant and left upper quadrant.  Sometimes it is brought on by eating.  She she complains of severe constipation with very hard stools.  She states that she is drinking a great deal of water each day.    She denies nicotine or NSAID use.  She reports she is compliant with a daily multivitamin and calcium citrate.     The patient is  liquids and solids by 30 minutes.    The patient is not counting daily calories.    There is not a current exercise routine beyond walking.      The patient is following the rule of 20's:  Chewing each bite at least 20 times, putting the fork down for 20 seconds after swallowing, and making the meal last at least 20 minutes.   Past Medical History:   Diagnosis Date    Allergic rhinitis     Depressive disorder     Dyspnea on exertion 2023    History of bariatric surgery 2023    Hypothyroidism     Inverted nipple     bilateral - normal for her    Migraine     Obesity     NANDO (obstructive sleep apnea)     Osteoarthritis     Precordial pain 10/20/2022    PTSD (post-traumatic stress disorder)     Past Surgical History:   Procedure Laterality Date    GASTRIC BYPASS SURGERY      HYSTERECTOMY (CERVIX STATUS UNKNOWN)      Partial-still has 1 ovary    JOINT REPLACEMENT Left     OVARY REMOVAL      only removed 1 ovary        Family History   Problem Relation Age of Onset    Breast Cancer Sister         doesn't know age or laterality    Social History

## 2024-10-12 LAB
IRON % SATURATION: 39 % (ref 20–55)
IRON: 129 UG/DL (ref 37–145)
TOTAL IRON BINDING CAPACITY: 333 UG/DL (ref 250–450)
UNSATURATED IRON BINDING CAPACITY: 204 UG/DL (ref 112–347)
VITAMIN B-12: 315 PG/ML (ref 232–1245)
VITAMIN D 25-HYDROXY: 25.2 NG/ML (ref 30–100)

## 2024-10-15 LAB
CA-I ADJ PH7.4 SERPL-SCNC: 1.25 MMOL/L (ref 1.09–1.3)
CA-I SERPL ISE-SCNC: 1.24 MMOL/L (ref 1.09–1.3)
VIT B1 BLD-MCNC: 118 NMOL/L (ref 70–180)

## 2024-10-16 DIAGNOSIS — R10.11 RIGHT UPPER QUADRANT ABDOMINAL PAIN: Primary | ICD-10-CM

## 2024-10-21 ENCOUNTER — TELEPHONE (OUTPATIENT)
Dept: CARDIOLOGY CLINIC | Age: 44
End: 2024-10-21

## 2024-10-21 NOTE — TELEPHONE ENCOUNTER
INSURANCE HAS NOT AUTHORIZED TEST YET. PATIENT WILL RECEIVE A CALL FROM OFFICE ONCE WE RECEIVE THE APPROVAL

## 2024-10-21 NOTE — TELEPHONE ENCOUNTER
Last week received a call ,patient stated  was unable to understand the caller,patient is a Ugandan speaking patient. Patient stated would like to know if test ordered by provider were approved by her insurance.Patient would like to receive a call back from the office.

## 2024-10-23 NOTE — TELEPHONE ENCOUNTER
PATIENT AWARE   Bi-Rhombic Flap Text: The defect edges were debeveled with a #15 scalpel blade.  Given the location of the defect and the proximity to free margins a bi-rhombic flap was deemed most appropriate.  Using a sterile surgical marker, an appropriate rhombic flap was drawn incorporating the defect. The area thus outlined was incised deep to adipose tissue with a #15 scalpel blade.  The skin margins were undermined to an appropriate distance in all directions utilizing iris scissors.

## 2024-10-24 ENCOUNTER — TELEPHONE (OUTPATIENT)
Dept: CARDIOLOGY CLINIC | Age: 44
End: 2024-10-24

## 2024-10-24 NOTE — TELEPHONE ENCOUNTER
Peer 2 Peer Requested 10/24/24 - AMANDA/AZAM       A preliminary review has been performed that is NOT a final determination. Requests for further information and a peer to peer discussion are being sent to the ordering physician. However, if no further information is received within the regulatory time frame, this determination will become final.    Information Relied Upon: · Based on what was given, chest pain, your doctor's request cannot be approved. · A person might need a(n) CTA Coronary Arteries if these notes have/has been given: results of your previous heart picture test (CTA Coronary Arteries approved 5/9/2023) and the reason more heart testing is need now. The information we got did not include these notes.      To initiate a [id number] discussion call: [phone].      Staff message sent to provider.

## 2024-10-25 ENCOUNTER — HOSPITAL ENCOUNTER (OUTPATIENT)
Dept: CT IMAGING | Age: 44
Discharge: HOME | End: 2024-10-27
Attending: SURGERY
Payer: COMMERCIAL

## 2024-10-25 ENCOUNTER — HOSPITAL ENCOUNTER (OUTPATIENT)
Dept: ULTRASOUND IMAGING | Age: 44
Discharge: HOME | End: 2024-10-27
Payer: COMMERCIAL

## 2024-10-25 DIAGNOSIS — E21.3 HYPERPARATHYROIDISM (HCC): ICD-10-CM

## 2024-10-25 DIAGNOSIS — R10.11 RIGHT UPPER QUADRANT ABDOMINAL PAIN: ICD-10-CM

## 2024-10-25 PROCEDURE — 74176 CT ABD & PELVIS W/O CONTRAST: CPT

## 2024-10-25 PROCEDURE — 6360000004 HC RX CONTRAST MEDICATION: Performed by: SURGERY

## 2024-10-25 PROCEDURE — 76536 US EXAM OF HEAD AND NECK: CPT

## 2024-10-25 RX ORDER — IOPAMIDOL 755 MG/ML
18 INJECTION, SOLUTION INTRAVASCULAR
Status: COMPLETED | OUTPATIENT
Start: 2024-10-25 | End: 2024-10-25

## 2024-10-25 RX ORDER — IOPAMIDOL 755 MG/ML
75 INJECTION, SOLUTION INTRAVASCULAR
Status: ACTIVE | OUTPATIENT
Start: 2024-10-25

## 2024-10-25 RX ADMIN — IOPAMIDOL 18 ML: 755 INJECTION, SOLUTION INTRAVENOUS at 13:33

## 2024-10-29 ENCOUNTER — OFFICE VISIT (OUTPATIENT)
Dept: PULMONOLOGY | Age: 44
End: 2024-10-29
Payer: COMMERCIAL

## 2024-10-29 ENCOUNTER — PATIENT MESSAGE (OUTPATIENT)
Dept: PULMONOLOGY | Age: 44
End: 2024-10-29

## 2024-10-29 VITALS
BODY MASS INDEX: 26.83 KG/M2 | OXYGEN SATURATION: 100 % | HEART RATE: 69 BPM | WEIGHT: 164 LBS | SYSTOLIC BLOOD PRESSURE: 130 MMHG | DIASTOLIC BLOOD PRESSURE: 70 MMHG

## 2024-10-29 DIAGNOSIS — G47.33 OSA (OBSTRUCTIVE SLEEP APNEA): Primary | ICD-10-CM

## 2024-10-29 DIAGNOSIS — R06.02 SHORTNESS OF BREATH: ICD-10-CM

## 2024-10-29 PROCEDURE — 99214 OFFICE O/P EST MOD 30 MIN: CPT | Performed by: INTERNAL MEDICINE

## 2024-10-29 PROCEDURE — G8419 CALC BMI OUT NRM PARAM NOF/U: HCPCS | Performed by: INTERNAL MEDICINE

## 2024-10-29 PROCEDURE — 1036F TOBACCO NON-USER: CPT | Performed by: INTERNAL MEDICINE

## 2024-10-29 PROCEDURE — G8484 FLU IMMUNIZE NO ADMIN: HCPCS | Performed by: INTERNAL MEDICINE

## 2024-10-29 PROCEDURE — G8427 DOCREV CUR MEDS BY ELIG CLIN: HCPCS | Performed by: INTERNAL MEDICINE

## 2024-10-29 NOTE — PROGRESS NOTES
Osteoarthritis     Precordial pain 10/20/2022    PTSD (post-traumatic stress disorder)      Past Surgical History:   Procedure Laterality Date    GASTRIC BYPASS SURGERY  2020    HYSTERECTOMY (CERVIX STATUS UNKNOWN)      Partial-still has 1 ovary    JOINT REPLACEMENT Left     OVARY REMOVAL      only removed 1 ovary     Family History   Problem Relation Age of Onset    Breast Cancer Sister         doesn't know age or laterality     Social History     Socioeconomic History    Marital status: Single     Spouse name: Not on file    Number of children: Not on file    Years of education: Not on file    Highest education level: Not on file   Occupational History    Not on file   Tobacco Use    Smoking status: Never    Smokeless tobacco: Never   Vaping Use    Vaping status: Never Used   Substance and Sexual Activity    Alcohol use: Not on file     Comment: social    Drug use: Never    Sexual activity: Not on file   Other Topics Concern    Not on file   Social History Narrative    Not on file     Social Determinants of Health     Financial Resource Strain: Not on file   Food Insecurity: Not on file   Transportation Needs: Not on file   Physical Activity: Not on file   Stress: Not on file   Social Connections: Not on file   Intimate Partner Violence: Not on file   Housing Stability: Not on file         Review of Systems   Constitutional:  Negative for chills, diaphoresis, fatigue and fever.   HENT:  Negative for congestion, mouth sores, nosebleeds, postnasal drip, rhinorrhea, sinus pressure, sneezing, sore throat, trouble swallowing and voice change.    Eyes:  Negative for discharge, itching and visual disturbance.   Respiratory:  Positive for shortness of breath. Negative for cough, chest tightness and wheezing.    Cardiovascular:  Negative for chest pain, palpitations and leg swelling.   Gastrointestinal:  Negative for abdominal pain, diarrhea, nausea and vomiting.   Genitourinary:  Negative for difficulty urinating and

## 2024-11-07 ENCOUNTER — OFFICE VISIT (OUTPATIENT)
Dept: BARIATRICS/WEIGHT MGMT | Age: 44
End: 2024-11-07
Payer: COMMERCIAL

## 2024-11-07 VITALS
DIASTOLIC BLOOD PRESSURE: 82 MMHG | SYSTOLIC BLOOD PRESSURE: 118 MMHG | HEART RATE: 50 BPM | HEIGHT: 66 IN | BODY MASS INDEX: 26.93 KG/M2 | WEIGHT: 167.6 LBS

## 2024-11-07 DIAGNOSIS — Z98.84 S/P GASTRIC BYPASS: ICD-10-CM

## 2024-11-07 DIAGNOSIS — R10.33 PERIUMBILICAL ABDOMINAL PAIN: Primary | ICD-10-CM

## 2024-11-07 PROCEDURE — G8419 CALC BMI OUT NRM PARAM NOF/U: HCPCS | Performed by: SURGERY

## 2024-11-07 PROCEDURE — 99215 OFFICE O/P EST HI 40 MIN: CPT | Performed by: SURGERY

## 2024-11-07 PROCEDURE — 1036F TOBACCO NON-USER: CPT | Performed by: SURGERY

## 2024-11-07 PROCEDURE — G8484 FLU IMMUNIZE NO ADMIN: HCPCS | Performed by: SURGERY

## 2024-11-07 PROCEDURE — G8427 DOCREV CUR MEDS BY ELIG CLIN: HCPCS | Performed by: SURGERY

## 2024-11-07 RX ORDER — PSEUDOEPHED/ACETAMINOPH/DIPHEN 30MG-500MG
TABLET ORAL
COMMUNITY
Start: 2024-10-19

## 2024-11-07 RX ORDER — SODIUM CHLORIDE, SODIUM LACTATE, POTASSIUM CHLORIDE, CALCIUM CHLORIDE 600; 310; 30; 20 MG/100ML; MG/100ML; MG/100ML; MG/100ML
INJECTION, SOLUTION INTRAVENOUS CONTINUOUS
OUTPATIENT
Start: 2024-11-07

## 2024-11-07 RX ORDER — ACETAMINOPHEN 160 MG
2000 TABLET,DISINTEGRATING ORAL DAILY
COMMUNITY
Start: 2024-07-25

## 2024-11-07 RX ORDER — SODIUM CHLORIDE 0.9 % (FLUSH) 0.9 %
5-40 SYRINGE (ML) INJECTION EVERY 12 HOURS SCHEDULED
OUTPATIENT
Start: 2024-11-07

## 2024-11-07 RX ORDER — ONDANSETRON 2 MG/ML
4 INJECTION INTRAMUSCULAR; INTRAVENOUS ONCE
OUTPATIENT
Start: 2024-11-07 | End: 2024-11-07

## 2024-11-07 RX ORDER — SODIUM CHLORIDE 0.9 % (FLUSH) 0.9 %
5-40 SYRINGE (ML) INJECTION PRN
OUTPATIENT
Start: 2024-11-07

## 2024-11-07 RX ORDER — CIPROFLOXACIN HYDROCHLORIDE 3.5 MG/ML
SOLUTION/ DROPS TOPICAL
COMMUNITY
Start: 2024-09-19

## 2024-11-07 RX ORDER — PANTOPRAZOLE SODIUM 40 MG/1
40 TABLET, DELAYED RELEASE ORAL
Qty: 90 TABLET | Refills: 1 | Status: SHIPPED | OUTPATIENT
Start: 2024-11-07

## 2024-11-07 RX ORDER — HEPARIN SODIUM 5000 [USP'U]/ML
2500 INJECTION, SOLUTION INTRAVENOUS; SUBCUTANEOUS ONCE
OUTPATIENT
Start: 2024-11-07 | End: 2024-11-07

## 2024-11-07 RX ORDER — CALCIUM CARBONATE/VITAMIN D3 600 MG-10
TABLET ORAL
COMMUNITY
Start: 2024-10-21

## 2024-11-07 RX ORDER — ACETAMINOPHEN 325 MG/1
1000 TABLET ORAL ONCE
OUTPATIENT
Start: 2024-11-07 | End: 2024-11-07

## 2024-11-07 RX ORDER — SODIUM CHLORIDE 9 MG/ML
INJECTION, SOLUTION INTRAVENOUS PRN
OUTPATIENT
Start: 2024-11-07

## 2024-11-08 ENCOUNTER — ANESTHESIA (OUTPATIENT)
Dept: OPERATING ROOM | Age: 44
End: 2024-11-08
Payer: COMMERCIAL

## 2024-11-08 ENCOUNTER — PREP FOR PROCEDURE (OUTPATIENT)
Dept: BARIATRICS/WEIGHT MGMT | Age: 44
End: 2024-11-08

## 2024-11-08 DIAGNOSIS — Z98.84 HX OF BARIATRIC SURGERY: ICD-10-CM

## 2024-11-08 DIAGNOSIS — R10.33 PERIUMBILICAL ABDOMINAL PAIN: ICD-10-CM

## 2024-11-08 LAB
ESTIMATED AVERAGE GLUCOSE: 85 MG/DL
HBA1C MFR BLD: 4.6 % (ref 4–6)

## 2024-11-08 NOTE — PROGRESS NOTES
Surgery Consultation    Patient Name:  :  Hospital Day:   Christina Olivier 1980 [unfilled]     Date of Service: 2024    Subjective:      History of Present Illness:    Christina Olivier  is a 43 y.o. female referred by uLpe Sales APRN - CNP  for intermittently severe abdominal cramping. Christina is a few years status post laparoscopic gastric bypass at [facility].  She has lost all her excess weight and kept it off.     Christina reports intermittently severe cramping in her right upper quadrant and left upper quadrant.  Sometimes it is brought on by eating.  She she complains of severe constipation with very hard stools.  She states that she is drinking a great deal of water each day.     She denies nicotine or NSAID use.  She reports she is compliant with a daily multivitamin and calcium citrate.      The patient is  liquids and solids by 30 minutes.    The patient is not counting daily calories.    There is not a current exercise routine beyond walking.       The patient is following the rule of 20's:  Chewing each bite at least 20 times, putting the fork down for 20 seconds after swallowing, and making the meal last at least 20 minutes.       Christina completed constipation treatment ordered at her first visit, but pain has persisted.  A CT scan of 10/25/2024 was read as no acute intra-abdominal or pelvic process, but swirling mesenteric vessels are concerning for internal hernia.  She is not currently in pain.    Past Medical History        Past Medical History:   Diagnosis Date    Allergic rhinitis      Depressive disorder      Dyspnea on exertion 2023    History of bariatric surgery 2023    Hypothyroidism      Inverted nipple       bilateral - normal for her    Migraine      Obesity      NANDO (obstructive sleep apnea)      Osteoarthritis      Precordial pain 10/20/2022    PTSD (post-traumatic stress disorder)         Past Surgical History         Past Surgical

## 2024-11-08 NOTE — H&P (VIEW-ONLY)
27.42 kg/m²     No intake or output data in the 24 hours ending 11/08/24 1515    Physical Exam  General: No acute distress  HEENT: P PERRLA, no scleral icterus.  Trachea midline.  Thoracic: Clear to auscultation bilaterally.  Cardiac: Regular rate and rhythm with no rubs clicks or murmurs.  Abdomen: Obese, nontender.  No fluid shift or caput medusae.  No hepatosplenomegaly.  Extremities: No clubbing cyanosis or edema.  Neurologic: No gross motor or sensory defects.    Labs Reviewed:  Lab Results   Component Value Date/Time     08/06/2023 05:15 PM    K 3.9 08/06/2023 05:15 PM     08/06/2023 05:15 PM    CO2 17 08/06/2023 05:15 PM    AST 42 08/06/2023 05:15 PM    ALT 27 08/06/2023 05:15 PM    TSH 3.200 05/01/2023 02:45 PM     Lab Results   Component Value Date    HGB 12.1 10/11/2024    HCT 35.8 (L) 10/11/2024     Lab Results   Component Value Date/Time    INR 1.0 03/23/2013 04:10 AM     Radiology Reviewed:  [unfilled]     Diagnosis:        Assessment/Plan:          Christina Olivier  is a 43 y.o. female referred by Lupe Sales APRN - CNP  for intermittently severe abdominal cramping. Christina is a few years status post laparoscopic gastric bypass at [facility].  She has lost all her excess weight and kept it off.     Christina reports intermittently severe cramping in her right upper quadrant and left upper quadrant.  Sometimes it is brought on by eating.  She she complains of severe constipation with very hard stools.  She states that she is drinking a great deal of water each day.     She denies nicotine or NSAID use.  She reports she is compliant with a daily multivitamin and calcium citrate.      The patient is  liquids and solids by 30 minutes.    The patient is not counting daily calories.    There is not a current exercise routine beyond walking.       The patient is following the rule of 20's:  Chewing each bite at least 20 times, putting the fork down for 20 seconds after

## 2024-11-09 DIAGNOSIS — R07.9 CHEST PAIN, UNSPECIFIED TYPE: ICD-10-CM

## 2024-11-10 RX ORDER — NITROGLYCERIN 0.4 MG/1
0.4 TABLET SUBLINGUAL EVERY 5 MIN PRN
Qty: 25 TABLET | Refills: 3 | Status: SHIPPED | OUTPATIENT
Start: 2024-11-10

## 2024-11-11 ENCOUNTER — ANESTHESIA EVENT (OUTPATIENT)
Dept: OPERATING ROOM | Age: 44
End: 2024-11-11
Payer: COMMERCIAL

## 2024-11-11 ENCOUNTER — HOSPITAL ENCOUNTER (OUTPATIENT)
Age: 44
Setting detail: OBSERVATION
Discharge: HOME | End: 2024-11-12
Attending: SURGERY | Admitting: SURGERY
Payer: COMMERCIAL

## 2024-11-11 DIAGNOSIS — K45.8 INTERNAL HERNIA: Primary | ICD-10-CM

## 2024-11-11 PROCEDURE — S2900 ROBOTIC SURGICAL SYSTEM: HCPCS | Performed by: SURGERY

## 2024-11-11 PROCEDURE — 6360000002 HC RX W HCPCS: Performed by: ANESTHESIOLOGIST ASSISTANT

## 2024-11-11 PROCEDURE — 2580000003 HC RX 258: Performed by: SURGERY

## 2024-11-11 PROCEDURE — 3600000009 HC SURGERY ROBOT BASE: Performed by: SURGERY

## 2024-11-11 PROCEDURE — 6360000002 HC RX W HCPCS: Performed by: STUDENT IN AN ORGANIZED HEALTH CARE EDUCATION/TRAINING PROGRAM

## 2024-11-11 PROCEDURE — 44050 REDUCE BOWEL OBSTRUCTION: CPT | Performed by: SURGERY

## 2024-11-11 PROCEDURE — 44238 UNLISTED LAPS PX INTESTINE: CPT | Performed by: SURGERY

## 2024-11-11 PROCEDURE — A4217 STERILE WATER/SALINE, 500 ML: HCPCS | Performed by: SURGERY

## 2024-11-11 PROCEDURE — 3700000000 HC ANESTHESIA ATTENDED CARE: Performed by: SURGERY

## 2024-11-11 PROCEDURE — 6360000002 HC RX W HCPCS: Performed by: SURGERY

## 2024-11-11 PROCEDURE — 3700000001 HC ADD 15 MINUTES (ANESTHESIA): Performed by: SURGERY

## 2024-11-11 PROCEDURE — G0378 HOSPITAL OBSERVATION PER HR: HCPCS

## 2024-11-11 PROCEDURE — 3600000019 HC SURGERY ROBOT ADDTL 15MIN: Performed by: SURGERY

## 2024-11-11 PROCEDURE — 6370000000 HC RX 637 (ALT 250 FOR IP): Performed by: SURGERY

## 2024-11-11 PROCEDURE — 7100000001 HC PACU RECOVERY - ADDTL 15 MIN: Performed by: SURGERY

## 2024-11-11 PROCEDURE — 2500000003 HC RX 250 WO HCPCS: Performed by: ANESTHESIOLOGIST ASSISTANT

## 2024-11-11 PROCEDURE — 2709999900 HC NON-CHARGEABLE SUPPLY: Performed by: SURGERY

## 2024-11-11 PROCEDURE — 7100000000 HC PACU RECOVERY - FIRST 15 MIN: Performed by: SURGERY

## 2024-11-11 PROCEDURE — 64488 TAP BLOCK BI INJECTION: CPT | Performed by: STUDENT IN AN ORGANIZED HEALTH CARE EDUCATION/TRAINING PROGRAM

## 2024-11-11 PROCEDURE — 96372 THER/PROPH/DIAG INJ SC/IM: CPT

## 2024-11-11 RX ORDER — ONDANSETRON 2 MG/ML
4 INJECTION INTRAMUSCULAR; INTRAVENOUS ONCE
Status: COMPLETED | OUTPATIENT
Start: 2024-11-11 | End: 2024-11-11

## 2024-11-11 RX ORDER — SODIUM CHLORIDE 0.9 % (FLUSH) 0.9 %
5-40 SYRINGE (ML) INJECTION EVERY 12 HOURS SCHEDULED
Status: DISCONTINUED | OUTPATIENT
Start: 2024-11-11 | End: 2024-11-11 | Stop reason: HOSPADM

## 2024-11-11 RX ORDER — PROPOFOL 10 MG/ML
INJECTION, EMULSION INTRAVENOUS
Status: DISCONTINUED | OUTPATIENT
Start: 2024-11-11 | End: 2024-11-11 | Stop reason: SDUPTHER

## 2024-11-11 RX ORDER — OXYCODONE HYDROCHLORIDE 5 MG/1
10 TABLET ORAL EVERY 4 HOURS PRN
Status: DISCONTINUED | OUTPATIENT
Start: 2024-11-12 | End: 2024-11-12 | Stop reason: HOSPADM

## 2024-11-11 RX ORDER — ROPIVACAINE HYDROCHLORIDE 5 MG/ML
INJECTION, SOLUTION EPIDURAL; INFILTRATION; PERINEURAL
Status: COMPLETED | OUTPATIENT
Start: 2024-11-11 | End: 2024-11-11

## 2024-11-11 RX ORDER — SODIUM CHLORIDE 9 MG/ML
INJECTION, SOLUTION INTRAVENOUS CONTINUOUS
Status: DISCONTINUED | OUTPATIENT
Start: 2024-11-11 | End: 2024-11-12 | Stop reason: HOSPADM

## 2024-11-11 RX ORDER — SODIUM CHLORIDE 9 MG/ML
INJECTION, SOLUTION INTRAVENOUS PRN
Status: DISCONTINUED | OUTPATIENT
Start: 2024-11-11 | End: 2024-11-11 | Stop reason: HOSPADM

## 2024-11-11 RX ORDER — SODIUM CHLORIDE 0.9 % (FLUSH) 0.9 %
5-40 SYRINGE (ML) INJECTION PRN
Status: DISCONTINUED | OUTPATIENT
Start: 2024-11-11 | End: 2024-11-11 | Stop reason: HOSPADM

## 2024-11-11 RX ORDER — LIDOCAINE HYDROCHLORIDE 10 MG/ML
1 INJECTION, SOLUTION EPIDURAL; INFILTRATION; INTRACAUDAL; PERINEURAL
Status: DISCONTINUED | OUTPATIENT
Start: 2024-11-11 | End: 2024-11-11 | Stop reason: HOSPADM

## 2024-11-11 RX ORDER — DEXAMETHASONE SODIUM PHOSPHATE 4 MG/ML
INJECTION, SOLUTION INTRA-ARTICULAR; INTRALESIONAL; INTRAMUSCULAR; INTRAVENOUS; SOFT TISSUE
Status: DISCONTINUED | OUTPATIENT
Start: 2024-11-11 | End: 2024-11-11 | Stop reason: SDUPTHER

## 2024-11-11 RX ORDER — SODIUM CHLORIDE 0.9 % (FLUSH) 0.9 %
5-40 SYRINGE (ML) INJECTION PRN
Status: DISCONTINUED | OUTPATIENT
Start: 2024-11-11 | End: 2024-11-12 | Stop reason: HOSPADM

## 2024-11-11 RX ORDER — ONDANSETRON 4 MG/1
4 TABLET, ORALLY DISINTEGRATING ORAL EVERY 8 HOURS PRN
Status: DISCONTINUED | OUTPATIENT
Start: 2024-11-11 | End: 2024-11-12 | Stop reason: HOSPADM

## 2024-11-11 RX ORDER — HYDRALAZINE HYDROCHLORIDE 20 MG/ML
10 INJECTION INTRAMUSCULAR; INTRAVENOUS EVERY 6 HOURS PRN
Status: DISCONTINUED | OUTPATIENT
Start: 2024-11-11 | End: 2024-11-12 | Stop reason: HOSPADM

## 2024-11-11 RX ORDER — MORPHINE SULFATE 2 MG/ML
2 INJECTION, SOLUTION INTRAMUSCULAR; INTRAVENOUS EVERY 4 HOURS PRN
Status: DISCONTINUED | OUTPATIENT
Start: 2024-11-11 | End: 2024-11-12 | Stop reason: HOSPADM

## 2024-11-11 RX ORDER — SODIUM CHLORIDE, SODIUM LACTATE, POTASSIUM CHLORIDE, CALCIUM CHLORIDE 600; 310; 30; 20 MG/100ML; MG/100ML; MG/100ML; MG/100ML
INJECTION, SOLUTION INTRAVENOUS CONTINUOUS
Status: DISCONTINUED | OUTPATIENT
Start: 2024-11-11 | End: 2024-11-11 | Stop reason: HOSPADM

## 2024-11-11 RX ORDER — OXYCODONE HYDROCHLORIDE 5 MG/1
5 TABLET ORAL
Status: DISCONTINUED | OUTPATIENT
Start: 2024-11-11 | End: 2024-11-11 | Stop reason: HOSPADM

## 2024-11-11 RX ORDER — NAPROXEN 250 MG/1
750 TABLET ORAL
Status: ON HOLD | COMMUNITY
End: 2024-11-12 | Stop reason: SINTOL

## 2024-11-11 RX ORDER — FENTANYL CITRATE 50 UG/ML
INJECTION, SOLUTION INTRAMUSCULAR; INTRAVENOUS
Status: DISCONTINUED | OUTPATIENT
Start: 2024-11-11 | End: 2024-11-11 | Stop reason: SDUPTHER

## 2024-11-11 RX ORDER — ONDANSETRON 2 MG/ML
4 INJECTION INTRAMUSCULAR; INTRAVENOUS EVERY 6 HOURS PRN
Status: DISCONTINUED | OUTPATIENT
Start: 2024-11-11 | End: 2024-11-12 | Stop reason: HOSPADM

## 2024-11-11 RX ORDER — DIPHENHYDRAMINE HYDROCHLORIDE 50 MG/ML
INJECTION INTRAMUSCULAR; INTRAVENOUS
Status: DISCONTINUED | OUTPATIENT
Start: 2024-11-11 | End: 2024-11-11 | Stop reason: SDUPTHER

## 2024-11-11 RX ORDER — HEPARIN SODIUM 5000 [USP'U]/ML
2500 INJECTION, SOLUTION INTRAVENOUS; SUBCUTANEOUS ONCE
Status: COMPLETED | OUTPATIENT
Start: 2024-11-11 | End: 2024-11-11

## 2024-11-11 RX ORDER — MEPERIDINE HYDROCHLORIDE 25 MG/ML
12.5 INJECTION INTRAMUSCULAR; INTRAVENOUS; SUBCUTANEOUS
Status: DISCONTINUED | OUTPATIENT
Start: 2024-11-11 | End: 2024-11-11 | Stop reason: HOSPADM

## 2024-11-11 RX ORDER — OXYCODONE HYDROCHLORIDE 5 MG/1
5 TABLET ORAL EVERY 4 HOURS PRN
Status: DISCONTINUED | OUTPATIENT
Start: 2024-11-12 | End: 2024-11-12 | Stop reason: HOSPADM

## 2024-11-11 RX ORDER — SODIUM CHLORIDE 9 MG/ML
INJECTION, SOLUTION INTRAVENOUS PRN
Status: DISCONTINUED | OUTPATIENT
Start: 2024-11-11 | End: 2024-11-12 | Stop reason: HOSPADM

## 2024-11-11 RX ORDER — ONDANSETRON 2 MG/ML
4 INJECTION INTRAMUSCULAR; INTRAVENOUS
Status: DISCONTINUED | OUTPATIENT
Start: 2024-11-11 | End: 2024-11-11 | Stop reason: HOSPADM

## 2024-11-11 RX ORDER — LIDOCAINE HYDROCHLORIDE 10 MG/ML
INJECTION, SOLUTION EPIDURAL; INFILTRATION; INTRACAUDAL; PERINEURAL
Status: DISCONTINUED | OUTPATIENT
Start: 2024-11-11 | End: 2024-11-11 | Stop reason: SDUPTHER

## 2024-11-11 RX ORDER — ROCURONIUM BROMIDE 10 MG/ML
INJECTION, SOLUTION INTRAVENOUS
Status: DISCONTINUED | OUTPATIENT
Start: 2024-11-11 | End: 2024-11-11 | Stop reason: SDUPTHER

## 2024-11-11 RX ORDER — MIDAZOLAM HYDROCHLORIDE 1 MG/ML
INJECTION, SOLUTION INTRAMUSCULAR; INTRAVENOUS
Status: DISCONTINUED | OUTPATIENT
Start: 2024-11-11 | End: 2024-11-11 | Stop reason: SDUPTHER

## 2024-11-11 RX ORDER — NALOXONE HYDROCHLORIDE 0.4 MG/ML
INJECTION, SOLUTION INTRAMUSCULAR; INTRAVENOUS; SUBCUTANEOUS PRN
Status: DISCONTINUED | OUTPATIENT
Start: 2024-11-11 | End: 2024-11-11 | Stop reason: HOSPADM

## 2024-11-11 RX ORDER — FENTANYL CITRATE 0.05 MG/ML
50 INJECTION, SOLUTION INTRAMUSCULAR; INTRAVENOUS EVERY 10 MIN PRN
Status: DISCONTINUED | OUTPATIENT
Start: 2024-11-11 | End: 2024-11-11 | Stop reason: HOSPADM

## 2024-11-11 RX ORDER — ACETAMINOPHEN 500 MG
1000 TABLET ORAL ONCE
Status: COMPLETED | OUTPATIENT
Start: 2024-11-11 | End: 2024-11-11

## 2024-11-11 RX ORDER — ACETAMINOPHEN 325 MG/1
650 TABLET ORAL EVERY 6 HOURS
Status: DISCONTINUED | OUTPATIENT
Start: 2024-11-12 | End: 2024-11-12 | Stop reason: HOSPADM

## 2024-11-11 RX ORDER — ONDANSETRON 2 MG/ML
INJECTION INTRAMUSCULAR; INTRAVENOUS
Status: DISCONTINUED | OUTPATIENT
Start: 2024-11-11 | End: 2024-11-11 | Stop reason: SDUPTHER

## 2024-11-11 RX ORDER — HYDROMORPHONE HYDROCHLORIDE 1 MG/ML
1 INJECTION, SOLUTION INTRAMUSCULAR; INTRAVENOUS; SUBCUTANEOUS
Status: DISCONTINUED | OUTPATIENT
Start: 2024-11-11 | End: 2024-11-11

## 2024-11-11 RX ORDER — DIPHENHYDRAMINE HYDROCHLORIDE 50 MG/ML
12.5 INJECTION INTRAMUSCULAR; INTRAVENOUS
Status: DISCONTINUED | OUTPATIENT
Start: 2024-11-11 | End: 2024-11-11 | Stop reason: HOSPADM

## 2024-11-11 RX ORDER — BUPRENORPHINE HYDROCHLORIDE 0.32 MG/ML
INJECTION INTRAMUSCULAR; INTRAVENOUS
Status: DISCONTINUED | OUTPATIENT
Start: 2024-11-11 | End: 2024-11-11 | Stop reason: SDUPTHER

## 2024-11-11 RX ORDER — SODIUM CHLORIDE 0.9 % (FLUSH) 0.9 %
5-40 SYRINGE (ML) INJECTION EVERY 12 HOURS SCHEDULED
Status: DISCONTINUED | OUTPATIENT
Start: 2024-11-11 | End: 2024-11-12 | Stop reason: HOSPADM

## 2024-11-11 RX ORDER — MAGNESIUM HYDROXIDE 1200 MG/15ML
LIQUID ORAL CONTINUOUS PRN
Status: DISCONTINUED | OUTPATIENT
Start: 2024-11-11 | End: 2024-11-11 | Stop reason: HOSPADM

## 2024-11-11 RX ORDER — METOCLOPRAMIDE HYDROCHLORIDE 5 MG/ML
10 INJECTION INTRAMUSCULAR; INTRAVENOUS
Status: DISCONTINUED | OUTPATIENT
Start: 2024-11-11 | End: 2024-11-11 | Stop reason: HOSPADM

## 2024-11-11 RX ADMIN — ROCURONIUM BROMIDE 10 MG: 10 INJECTION, SOLUTION INTRAVENOUS at 11:38

## 2024-11-11 RX ADMIN — MORPHINE SULFATE 2 MG: 2 INJECTION, SOLUTION INTRAMUSCULAR; INTRAVENOUS at 23:45

## 2024-11-11 RX ADMIN — PROPOFOL 20 MG: 10 INJECTION, EMULSION INTRAVENOUS at 12:10

## 2024-11-11 RX ADMIN — HYDROMORPHONE HYDROCHLORIDE 1 MG: 1 INJECTION, SOLUTION INTRAMUSCULAR; INTRAVENOUS; SUBCUTANEOUS at 17:12

## 2024-11-11 RX ADMIN — PROPOFOL 160 MG: 10 INJECTION, EMULSION INTRAVENOUS at 10:42

## 2024-11-11 RX ADMIN — ROCURONIUM BROMIDE 50 MG: 10 INJECTION, SOLUTION INTRAVENOUS at 10:43

## 2024-11-11 RX ADMIN — FENTANYL CITRATE 25 MCG: 50 INJECTION, SOLUTION INTRAMUSCULAR; INTRAVENOUS at 12:09

## 2024-11-11 RX ADMIN — SODIUM CHLORIDE, PRESERVATIVE FREE 10 ML: 5 INJECTION INTRAVENOUS at 20:30

## 2024-11-11 RX ADMIN — FENTANYL CITRATE 25 MCG: 50 INJECTION, SOLUTION INTRAMUSCULAR; INTRAVENOUS at 11:13

## 2024-11-11 RX ADMIN — DEXAMETHASONE SODIUM PHOSPHATE 8 MG: 4 INJECTION, SOLUTION INTRAMUSCULAR; INTRAVENOUS at 10:47

## 2024-11-11 RX ADMIN — CEFAZOLIN 2000 MG: 2 INJECTION, POWDER, FOR SOLUTION INTRAMUSCULAR; INTRAVENOUS at 10:47

## 2024-11-11 RX ADMIN — FENTANYL CITRATE 25 MCG: 50 INJECTION, SOLUTION INTRAMUSCULAR; INTRAVENOUS at 10:42

## 2024-11-11 RX ADMIN — SODIUM CHLORIDE: 9 INJECTION, SOLUTION INTRAVENOUS at 14:22

## 2024-11-11 RX ADMIN — MIDAZOLAM HYDROCHLORIDE 2 MG: 1 INJECTION, SOLUTION INTRAMUSCULAR; INTRAVENOUS at 10:36

## 2024-11-11 RX ADMIN — HYDROMORPHONE HYDROCHLORIDE 0.5 MG: 1 INJECTION, SOLUTION INTRAMUSCULAR; INTRAVENOUS; SUBCUTANEOUS at 12:54

## 2024-11-11 RX ADMIN — ONDANSETRON 4 MG: 2 INJECTION, SOLUTION INTRAMUSCULAR; INTRAVENOUS at 11:56

## 2024-11-11 RX ADMIN — FENTANYL CITRATE 50 MCG: 0.05 INJECTION, SOLUTION INTRAMUSCULAR; INTRAVENOUS at 13:27

## 2024-11-11 RX ADMIN — DIPHENHYDRAMINE HYDROCHLORIDE 25 MG: 50 INJECTION INTRAMUSCULAR; INTRAVENOUS at 10:45

## 2024-11-11 RX ADMIN — SODIUM CHLORIDE, PRESERVATIVE FREE 10 ML: 5 INJECTION INTRAVENOUS at 23:47

## 2024-11-11 RX ADMIN — ONDANSETRON 4 MG: 2 INJECTION, SOLUTION INTRAMUSCULAR; INTRAVENOUS at 09:42

## 2024-11-11 RX ADMIN — BUPRENORPHINE HYDROCHLORIDE 0.3 MG: 0.32 INJECTION INTRAMUSCULAR; INTRAVENOUS at 10:58

## 2024-11-11 RX ADMIN — ACETAMINOPHEN 1000 MG: 500 TABLET ORAL at 09:42

## 2024-11-11 RX ADMIN — FENTANYL CITRATE 25 MCG: 50 INJECTION, SOLUTION INTRAMUSCULAR; INTRAVENOUS at 10:44

## 2024-11-11 RX ADMIN — CEFAZOLIN 2000 MG: 2 INJECTION, POWDER, FOR SOLUTION INTRAMUSCULAR; INTRAVENOUS at 18:54

## 2024-11-11 RX ADMIN — HYDROMORPHONE HYDROCHLORIDE 0.5 MG: 1 INJECTION, SOLUTION INTRAMUSCULAR; INTRAVENOUS; SUBCUTANEOUS at 13:05

## 2024-11-11 RX ADMIN — SODIUM CHLORIDE: 9 INJECTION, SOLUTION INTRAVENOUS at 20:48

## 2024-11-11 RX ADMIN — PROPOFOL 20 MG: 10 INJECTION, EMULSION INTRAVENOUS at 12:13

## 2024-11-11 RX ADMIN — HEPARIN SODIUM 2500 UNITS: 5000 INJECTION INTRAVENOUS; SUBCUTANEOUS at 09:42

## 2024-11-11 RX ADMIN — PROPOFOL 40 MG: 10 INJECTION, EMULSION INTRAVENOUS at 10:44

## 2024-11-11 RX ADMIN — PROPOFOL 20 MG: 10 INJECTION, EMULSION INTRAVENOUS at 12:08

## 2024-11-11 RX ADMIN — SUGAMMADEX 200 MG: 100 INJECTION, SOLUTION INTRAVENOUS at 12:16

## 2024-11-11 RX ADMIN — ROPIVACAINE HYDROCHLORIDE 50 ML: 5 INJECTION, SOLUTION EPIDURAL; INFILTRATION; PERINEURAL at 10:43

## 2024-11-11 RX ADMIN — SODIUM CHLORIDE, POTASSIUM CHLORIDE, SODIUM LACTATE AND CALCIUM CHLORIDE: 600; 310; 30; 20 INJECTION, SOLUTION INTRAVENOUS at 09:41

## 2024-11-11 RX ADMIN — LIDOCAINE HYDROCHLORIDE 50 MG: 10 INJECTION, SOLUTION EPIDURAL; INFILTRATION; INTRACAUDAL; PERINEURAL at 10:42

## 2024-11-11 RX ADMIN — HYDROMORPHONE HYDROCHLORIDE 1 MG: 1 INJECTION, SOLUTION INTRAMUSCULAR; INTRAVENOUS; SUBCUTANEOUS at 20:30

## 2024-11-11 ASSESSMENT — PAIN SCALES - GENERAL
PAINLEVEL_OUTOF10: 10
PAINLEVEL_OUTOF10: 7
PAINLEVEL_OUTOF10: 3
PAINLEVEL_OUTOF10: 10
PAINLEVEL_OUTOF10: 10
PAINLEVEL_OUTOF10: 4
PAINLEVEL_OUTOF10: 5

## 2024-11-11 ASSESSMENT — ENCOUNTER SYMPTOMS: SHORTNESS OF BREATH: 1

## 2024-11-11 ASSESSMENT — PAIN DESCRIPTION - DESCRIPTORS
DESCRIPTORS: SHARP
DESCRIPTORS: SHARP

## 2024-11-11 ASSESSMENT — PAIN DESCRIPTION - LOCATION
LOCATION: ABDOMEN

## 2024-11-11 ASSESSMENT — PAIN DESCRIPTION - ORIENTATION
ORIENTATION: RIGHT
ORIENTATION: LOWER

## 2024-11-11 ASSESSMENT — PAIN SCALES - WONG BAKER: WONGBAKER_NUMERICALRESPONSE: 8;10

## 2024-11-11 NOTE — ANESTHESIA POSTPROCEDURE EVALUATION
Department of Anesthesiology  Postprocedure Note    Patient: Christina Olivier  MRN: [id number]  YOB: 1980  Date of evaluation: 11/11/2024    Procedure Summary       Date: 11/11/24 Room / Location: [address]    Anesthesia Start: 1036 Anesthesia Stop: 1227    Procedure: ROBOTIC ABDOMINAL EXPLORATION WITH CLOSURE OF INTERNAL HERNIA (Abdomen) Diagnosis:       Periumbilical abdominal pain      Hx of bariatric surgery      (Periumbilical abdominal pain [R10.33])      (Hx of bariatric surgery [Z98.84])    Surgeons: Christoph Rivera MD Responsible Provider: Alessandro De León DO    Anesthesia Type: general, regional ASA Status: 3            Anesthesia Type: No value filed.    Kumar Phase I:      Kumar Phase II:      Anesthesia Post Evaluation    Patient location during evaluation: bedside  Patient participation: complete - patient participated  Level of consciousness: awake and awake and alert  Airway patency: patent  Nausea & Vomiting: no nausea and no vomiting  Cardiovascular status: blood pressure returned to baseline and hemodynamically stable  Respiratory status: acceptable  Hydration status: euvolemic  Pain management: adequate        No notable events documented.

## 2024-11-11 NOTE — ANESTHESIA PRE PROCEDURE
has 1 ovary   • JOINT REPLACEMENT Left    • OVARY REMOVAL      only removed 1 ovary       Social History:    Social History     Tobacco Use   • Smoking status: Never   • Smokeless tobacco: Never   Substance Use Topics   • Alcohol use: Not on file     Comment: social                                Counseling given: Not Answered      Vital Signs (Current):   Vitals:    11/11/24 0915   BP: (!) 164/92   Pulse: 58   Resp: 18   Temp: 97 °F (36.1 °C)   TempSrc: Temporal   SpO2: 100%   Weight: 75.8 kg (167 lb)   Height: 1.702 m (5' 7\")                                              BP Readings from Last 3 Encounters:   11/11/24 (!) 164/92   11/07/24 118/82   10/29/24 130/70       NPO Status: Time of last liquid consumption: 2100                        Time of last solid consumption: 2100                        Date of last liquid consumption: 11/08/24                        Date of last solid food consumption: 11/08/24    BMI:   Wt Readings from Last 3 Encounters:   11/11/24 75.8 kg (167 lb)   11/07/24 76 kg (167 lb 9.6 oz)   10/29/24 74.4 kg (164 lb)     Body mass index is 26.16 kg/m².    CBC:   Lab Results   Component Value Date/Time    WBC 5.7 10/11/2024 02:08 PM    RBC 3.83 10/11/2024 02:08 PM    HGB 12.1 10/11/2024 02:08 PM    HCT 35.8 10/11/2024 02:08 PM    MCV 93.5 10/11/2024 02:08 PM    RDW 13.1 10/11/2024 02:08 PM     10/11/2024 02:08 PM       CMP:   Lab Results   Component Value Date/Time     08/06/2023 05:15 PM    K 3.9 08/06/2023 05:15 PM     08/06/2023 05:15 PM    CO2 17 08/06/2023 05:15 PM    BUN 8 08/06/2023 05:15 PM    CREATININE 0.50 08/06/2023 05:15 PM    GFRAA >60.0 08/28/2022 11:15 PM    LABGLOM >60.0 08/06/2023 05:15 PM    GLUCOSE 73 08/06/2023 05:15 PM    CALCIUM 9.1 08/06/2023 05:15 PM    BILITOT 0.4 08/06/2023 05:15 PM    ALKPHOS 62 08/06/2023 05:15 PM    AST 42 08/06/2023 05:15 PM    ALT 27 08/06/2023 05:15 PM       POC Tests: No results for input(s): \"POCGLU\", \"POCNA\", \"POCK\",

## 2024-11-11 NOTE — ANESTHESIA PROCEDURE NOTES
Peripheral Block    Patient location during procedure: OR  Reason for block: post-op pain management and at surgeon's request  Start time: 11/11/2024 10:43 AM  End time: 11/11/2024 10:53 AM  Staffing  Performed: anesthesiologist   Anesthesiologist: Alessandro De León DO  Performed by: Alessandro De León DO  Authorized by: Alessandro De León DO    Preanesthetic Checklist  Completed: patient identified, IV checked, site marked, risks and benefits discussed, surgical/procedural consents, equipment checked, pre-op evaluation, timeout performed, anesthesia consent given, oxygen available and monitors applied/VS acknowledged  Peripheral Block   Patient position: supine  Prep: ChloraPrep  Provider prep: mask and sterile gloves  Patient monitoring: cardiac monitor, continuous pulse ox, frequent blood pressure checks and IV access  Block type: TAP  Laterality: bilateral  Injection technique: single-shot  Guidance: ultrasound guided  Local infiltration: ropivacaine  Infiltration strength: 0.5 %  Local infiltration: ropivacaine  Dose: 30 mL    Needle   Needle type: combined needle/nerve stimulator   Needle gauge: 22 G  Needle localization: anatomical landmarks and ultrasound guidance  Needle length: 10 cm  Assessment   Injection assessment: negative aspiration for heme, no paresthesia on injection and local visualized surrounding nerve on ultrasound  Paresthesia pain: immediately resolved  Slow fractionated injection: yes  Hemodynamics: stable    Additional Notes  Ultrasound guidance used to view needle for placement    Ultrasound image stored, printed and saved in patient chart.    Sterile probe cover used    Ropivacaine 0.5% 50 ml + saline 20 ml + Buprenorphine 0.3 mg     35 ml bilateral TAP    Medications Administered  ropivacaine (NAROPIN) injection 0.5% - Perineural   50 mL - 11/11/2024 10:43:00 AM

## 2024-11-11 NOTE — INTERVAL H&P NOTE
Update History & Physical    The patient's History and Physical of November 8, 2024 was reviewed with the patient and I examined the patient. There was no change. The surgical site was confirmed by the patient and me.     Plan: The risks, benefits, expected outcome, and alternative to the recommended procedure have been discussed with the patient. Patient understands and wants to proceed with the procedure.     Electronically signed by Christoph Rivera MD on 11/11/2024 at 10:12 AM

## 2024-11-11 NOTE — PROGRESS NOTES
Pt arrived to the floor from PACU. Admission assessment complete. Pt is Axox4. Incisions on abd are CDI. Meds given per mar. SCDs in place. Pt aware of NPO status except for one ice chip an hour. Denies any further needs at this time. Daughter at bedside. Call light within reach.     Electronically signed by Lupe Sandoval RN on 11/11/2024 at 2:30 PM

## 2024-11-11 NOTE — OP NOTE
Operative Note      Patient: Christina Olivier  YOB: 1980  MRN: [id number]    Date of Procedure: 11/11/2024    Pre-Op Diagnosis Codes:      * Periumbilical abdominal pain [R10.33]     * Hx of bariatric surgery [Z98.84]    Post-Op Diagnosis:  Jejunojejunostomy and pseudo Rose hernia spaces open       Procedure(s):  ROBOTIC ABDOMINAL EXPLORATION WITH CLOSURE OF INTERNAL HERNIA    Surgeon(s):  Christoph Rivera MD    Assistant:   Physician Assistant: Racquel Hopkins PA-C    Anesthesia: General    Estimated Blood Loss (mL): Minimal    Complications: None    Specimens:   * No specimens in log *    Implants:  * No implants in log *      Drains:   Urinary Catheter 11/11/24 Guthrie-Temperature (Active)       Findings:  Infection Present At Time Of Surgery (PATOS) (choose all levels that have infection present):  No infection present  Other Findings: Jejunojejunostomy and pseudo Rose hernia spaces open  This procedure was not performed to treat colon cancer through resection    Detailed Description of Procedure:       The patient was brought to the operating room placed in the supine position on the operating table. After obtaining adequate general endotracheal anesthesia, a TAP block was performed bilaterally by Anesthesia.  The patient was prepped and draped in usual sterile manner with chlorhexidine paint.  A proper timeout procedure was taken to correctly identify the patient and procedure.     The abdomen was insufflated to 15 mmHg pressure using a Veress needle.  The abdomen was then entered under videoscopic visualization using a zero-degree 5mm  laparoscope and a 8 mm Visiport trocar. The trocar obturator and camera were removed and  the 0 degree laparoscope was placed in the abdomen to search for any signs of injury from trocar placement, and there were no signs of injury from trocar placement or from Veress needle placement.     The laparoscope was then used to guide one 8 mm trochar left

## 2024-11-12 VITALS
HEART RATE: 53 BPM | RESPIRATION RATE: 16 BRPM | HEIGHT: 67 IN | OXYGEN SATURATION: 100 % | BODY MASS INDEX: 27.15 KG/M2 | WEIGHT: 173 LBS | TEMPERATURE: 98.1 F | DIASTOLIC BLOOD PRESSURE: 72 MMHG | SYSTOLIC BLOOD PRESSURE: 158 MMHG

## 2024-11-12 PROCEDURE — 96374 THER/PROPH/DIAG INJ IV PUSH: CPT

## 2024-11-12 PROCEDURE — 2700000000 HC OXYGEN THERAPY PER DAY

## 2024-11-12 PROCEDURE — 2580000003 HC RX 258: Performed by: SURGERY

## 2024-11-12 PROCEDURE — G0378 HOSPITAL OBSERVATION PER HR: HCPCS

## 2024-11-12 PROCEDURE — 6360000002 HC RX W HCPCS: Performed by: SURGERY

## 2024-11-12 PROCEDURE — 96376 TX/PRO/DX INJ SAME DRUG ADON: CPT

## 2024-11-12 PROCEDURE — 6370000000 HC RX 637 (ALT 250 FOR IP): Performed by: SURGERY

## 2024-11-12 RX ORDER — ACETAMINOPHEN 500 MG
500 TABLET ORAL 4 TIMES DAILY PRN
Qty: 120 TABLET | Refills: 0 | Status: SHIPPED | OUTPATIENT
Start: 2024-11-12

## 2024-11-12 RX ORDER — SIMETHICONE 80 MG
80 TABLET,CHEWABLE ORAL 4 TIMES DAILY PRN
Qty: 180 TABLET | Refills: 3 | Status: SHIPPED | OUTPATIENT
Start: 2024-11-12

## 2024-11-12 RX ORDER — ONDANSETRON 4 MG/1
4 TABLET, ORALLY DISINTEGRATING ORAL EVERY 8 HOURS PRN
Qty: 30 TABLET | Refills: 1 | Status: SHIPPED | OUTPATIENT
Start: 2024-11-12

## 2024-11-12 RX ORDER — OXYCODONE HYDROCHLORIDE 5 MG/1
5 TABLET ORAL EVERY 6 HOURS PRN
Qty: 15 TABLET | Refills: 0 | Status: SHIPPED | OUTPATIENT
Start: 2024-11-12 | End: 2024-11-16

## 2024-11-12 RX ADMIN — ACETAMINOPHEN 325MG 650 MG: 325 TABLET ORAL at 10:27

## 2024-11-12 RX ADMIN — MORPHINE SULFATE 2 MG: 2 INJECTION, SOLUTION INTRAMUSCULAR; INTRAVENOUS at 03:45

## 2024-11-12 RX ADMIN — ACETAMINOPHEN 325MG 650 MG: 325 TABLET ORAL at 16:27

## 2024-11-12 RX ADMIN — CEFAZOLIN 2000 MG: 2 INJECTION, POWDER, FOR SOLUTION INTRAMUSCULAR; INTRAVENOUS at 03:25

## 2024-11-12 RX ADMIN — SODIUM CHLORIDE, PRESERVATIVE FREE 10 ML: 5 INJECTION INTRAVENOUS at 03:50

## 2024-11-12 RX ADMIN — MORPHINE SULFATE 2 MG: 2 INJECTION, SOLUTION INTRAMUSCULAR; INTRAVENOUS at 08:00

## 2024-11-12 RX ADMIN — SODIUM CHLORIDE: 9 INJECTION, SOLUTION INTRAVENOUS at 07:25

## 2024-11-12 RX ADMIN — OXYCODONE 10 MG: 5 TABLET ORAL at 15:09

## 2024-11-12 RX ADMIN — OXYCODONE 10 MG: 5 TABLET ORAL at 10:27

## 2024-11-12 ASSESSMENT — PAIN SCALES - WONG BAKER
WONGBAKER_NUMERICALRESPONSE: 8;6
WONGBAKER_NUMERICALRESPONSE: 4;6

## 2024-11-12 ASSESSMENT — PAIN SCALES - GENERAL
PAINLEVEL_OUTOF10: 9
PAINLEVEL_OUTOF10: 10
PAINLEVEL_OUTOF10: 9
PAINLEVEL_OUTOF10: 7
PAINLEVEL_OUTOF10: 5
PAINLEVEL_OUTOF10: 10
PAINLEVEL_OUTOF10: 10

## 2024-11-12 ASSESSMENT — PAIN DESCRIPTION - ORIENTATION: ORIENTATION: RIGHT

## 2024-11-12 ASSESSMENT — PAIN DESCRIPTION - LOCATION
LOCATION: ABDOMEN
LOCATION: ABDOMEN

## 2024-11-12 NOTE — CARE COORDINATION
MET W/PT TO DISCUSS DISCHARGE PLAN WHICH IS HOME ALONE. DTR IS SUPPORTIVE. PT TO FOLLOW UP W/DR. NICHOLAS'S OFFICE FOR CPAP ON 11/24/24. SPOKE W/DORIS FROM [employer] AND SHE INFORMED THE CPAP ORDER WAS SENT TO DR. NICHOLAS'S MA. ALSO PROVIDED PT W/[employer]'S CONTACT INFORMATION. PT HAS AGREED TO FOLLOW UP. DENIES FURTHER DISCHARGE NEEDS.   
care/goals and shares the quality data associated with the providers was provided to: Patient   Patient Representative Name:       The Patient and/or Patient Representative Agree with the Discharge Plan? Yes    Kiesha Browne RN  Case Management Department  Ph: [phone] Fax: [phone]

## 2024-11-12 NOTE — DISCHARGE SUMMARY
Patient ID  Christina Olivier   [id number]  1980     Admit date: 11/11/2024    Discharge date and time: No discharge date for patient encounter.     Admitting Physician: Christoph Rivera MD     Discharge Physician: Nicole    Admission Diagnoses: Periumbilical abdominal pain [R10.33]  Hx of bariatric surgery [Z98.84]  Internal hernia [K45.8]    Discharge Diagnoses: ssame    Admission Condition: fair    Discharged Condition: fair    Indication for Admission:abdominal pain    Surgical Procedures: Robotic closure of internal hernia sites.    Hospital Course: Hospital Course: Christina c/o pain requiring inpatient morphine and IV hydralazine for HTN,  but otherwise recovered well.    Consults: none    Discharge Exam:  Gen: alert, NAD  Lungs: Breathing comfortably on RA. No tachypnea, retractions, or increased WOB.  Abd: soft, ND, appropriately tender  Incision:  healing well    Disposition: Home.    Patient Instructions:   See D/C instructions    Activity: Do not lift anything heavier than 15 pounds for 2 weeks.    Diet: Bariatric full liquids  Wound Care: may shower.  Cover staples with band aids.    Christoph Rivera MD   11/12/2024  9:23 AM      Christoph Rivera MD, FASMBS, FACS, MBSAQIP Verified Surgeon  LCDR-USN-RET, Diplomate of The American Board of Surgery

## 2024-11-12 NOTE — DISCHARGE INSTRUCTIONS
open.  You develop dizzy episodes or fainting while standing.  You develop a rash.  You have difficulty breathing.  You develop a reaction or have side effects to medicines you were given.  You have new pain in your calf muscles or swelling in your legs.      Call Dr. Rivera office [phone] if you have any questions or concerns.

## 2024-11-12 NOTE — PROGRESS NOTES
0800 Shift assessment complete. Pt is Axox4. Meds given per mar. Sip challenge started. Incisions CDI. SCDs in place. Pt denies any further needs at this time. Call light within reach.     Electronically signed by Lupe Sandoval RN on 11/12/2024 at 8:10 AM

## 2024-11-12 NOTE — PROGRESS NOTES
IV removed without complication. Pt tolerated well. Catheter intact, dressing applied. No drainage noted.     Discharge instructions provided to patient. Verbalized understanding of follow up appointments, diet, activity, wound care, medications and reasons to return to ED/call physician. All questions answered. Copy of discharge instructions provided. Assisted into wheelchair and taken to personal car. Denies further needs.       Electronically signed by Lupe Sandoval RN on 11/12/2024 at 4:35 PM

## 2024-11-12 NOTE — FLOWSHEET NOTE
Patient is in bed,a new ice pack was provided per her request.she voiced that the dilaudid that was given earlier did not help her pain.her heart rate regular at 62 beats/min. , lungs clear,abdomen soft ,audible bowel sounds, her surgical sites were covered with large band aids,no edema or calf pain.    20:30 she was medicated with the Dilaudid for abdominal pain rated 10/10,described her pain as sharp,ice pack to her abdominal area provided little comfort.    20:50 she is watching Tv with her friend who is visiting. Her respirations were effortless.    23:20 patient expressed that morphine works better in controlling her pain.will notify Dr. Rivera .    23:45 :  patient was medicated with the morphine for complain of abdominal incision pain 10/10,whereby ,ten is the worst pain there is,denies nausea.    00:00 she is resting quietly in bed with her eyes closed,her respirations were unlabored.    07:00 she is resting in bed,her breathing is effortless.

## 2024-11-12 NOTE — PROGRESS NOTES
CLINICAL PHARMACY NOTE: MEDS TO BEDS    Total # of Prescriptions Filled: 4   The following medications were delivered to the patient:  Oxycodone 5 mg Tab  Simethicone 80 mg Chew Tab  Acetaminophen 500 mg Tab  Ondansetron 4 mg Odt Tab    Additional Documentation:

## 2024-11-14 ENCOUNTER — TELEPHONE (OUTPATIENT)
Dept: CARDIOLOGY CLINIC | Age: 44
End: 2024-11-14

## 2024-11-14 DIAGNOSIS — R06.02 SHORTNESS OF BREATH: Primary | ICD-10-CM

## 2024-11-14 NOTE — TELEPHONE ENCOUNTER
CCTA denied. Previously authd 5/9/23 - No testing found   Faxed [facility] a note advising testing was never done.

## 2024-11-21 ENCOUNTER — OFFICE VISIT (OUTPATIENT)
Dept: BARIATRICS/WEIGHT MGMT | Age: 44
End: 2024-11-21

## 2024-11-21 VITALS
HEART RATE: 70 BPM | OXYGEN SATURATION: 96 % | SYSTOLIC BLOOD PRESSURE: 104 MMHG | HEIGHT: 67 IN | BODY MASS INDEX: 25.08 KG/M2 | WEIGHT: 159.8 LBS | DIASTOLIC BLOOD PRESSURE: 64 MMHG

## 2024-11-21 DIAGNOSIS — Z98.890 S/P EXPLORATORY LAPAROTOMY: Primary | ICD-10-CM

## 2024-11-21 PROCEDURE — 99024 POSTOP FOLLOW-UP VISIT: CPT | Performed by: SURGERY

## 2024-11-21 NOTE — PATIENT INSTRUCTIONS
Debido al antecedente de cirugía bariátrica, se recomienda complementar diariamente con un multivitamínico con yas, así marleny citrato de calcio.  Discutimos que recomiendo al menos evaluaciones de laboratorio anuales de vitamina B1, vitamina B12, vitamina A, vitamina D, estudios de yas, así marleny CBC y CMP, y PTH y calcio ionizado.  Discutimos que si hay anemia a pesar de las reservas adecuadas de yas, también está indicada rajni evaluación del cobre.      Discutimos que las deficiencias persistentes de vitamina B1 y/o B12 podrían provocar trastornos neurológicos permanentes.    LAS CUATRO GRANDES DIRECTRICES:  1. ¡Cuenta calorías! Las personas que cuentan calorías comen menos. Utilice el plato diario u otras aplicaciones para montoya teléfono inteligente o computadora. Cuanto más cuentes, más experto te volverás y será cada vez más fácil. Si está tratando de perder peso y hacer mucho ejercicio, mantenga las calorías entre mil y 1200 por día. Si no hace ejercicio de manera marium, intente limitarlo a alrededor de 800 por día.    2. Separar líquidos y sólidos. Espere de 30 minutos a rajni hora después de comer antes de beber líquidos. Frost reducirá la cantidad total de alimentos que ingiere en la comida.    3. ¡Haz ejercicio! Cualquier cantidad ayuda mucho.    4. ¡Duerme! Trate de dormir 7 o más horas por noche. Si tiene apnea obstructiva del sueño, definitivamente use montoya máquina CPAP.

## 2024-11-21 NOTE — PROGRESS NOTES
Post Op Clinic Visit      Christina Olivier   is 1 week post-op from robotic closure of internal hernia sites. There have not been any fevers, chills, incisional redness or discharge. Pain has been mild.  There is no longer the pre-op pain.      Physical Exam:    /64   Pulse 70   Ht 1.702 m (5' 7.01\")   Wt 72.5 kg (159 lb 12.8 oz)   SpO2 96%   BMI 25.02 kg/m²     General: No acute distress  HEENT:  PERRLA, no scleral icterus.  Trachea midline.  Thoracic: Clear to auscultation bilaterally.  Cardiac: Regular rate and rhythm with no rubs clicks or murmurs.  Abdomen: Nondistended, nontender.  No fluid shift or caput medusae.  No hepatosplenomegaly. The incisions are healing well.  Extremities: No clubbing cyanosis or edema.  Neurologic: No gross motor or sensory defects.      Assessment and Plan:     Christina Olivier   is 1 week post-op from robotic closure of internal hernia sites. There have not been any fevers, chills, incisional redness or discharge. Pain has been mild.  There is no longer the pre-op pain.  Follow up PRN and within 1 year for bariatric lab follow up.  I prescribed calcium citrate chews, 500 mg BID for high PTH level (mildly high).    Issa Rivera MD, FACS, [facility]

## 2024-12-06 ENCOUNTER — HOSPITAL ENCOUNTER (EMERGENCY)
Age: 44
Discharge: HOME | End: 2024-12-07
Attending: STUDENT IN AN ORGANIZED HEALTH CARE EDUCATION/TRAINING PROGRAM
Payer: COMMERCIAL

## 2024-12-06 ENCOUNTER — APPOINTMENT (OUTPATIENT)
Dept: CT IMAGING | Age: 44
End: 2024-12-06
Payer: COMMERCIAL

## 2024-12-06 ENCOUNTER — APPOINTMENT (OUTPATIENT)
Dept: GENERAL RADIOLOGY | Age: 44
End: 2024-12-06
Payer: COMMERCIAL

## 2024-12-06 VITALS
BODY MASS INDEX: 24.11 KG/M2 | TEMPERATURE: 98.4 F | SYSTOLIC BLOOD PRESSURE: 153 MMHG | DIASTOLIC BLOOD PRESSURE: 68 MMHG | HEART RATE: 56 BPM | RESPIRATION RATE: 14 BRPM | OXYGEN SATURATION: 95 % | WEIGHT: 154 LBS

## 2024-12-06 DIAGNOSIS — R07.89 ATYPICAL CHEST PAIN: ICD-10-CM

## 2024-12-06 DIAGNOSIS — F41.1 ANXIETY STATE: ICD-10-CM

## 2024-12-06 DIAGNOSIS — R51.9 ACUTE NONINTRACTABLE HEADACHE, UNSPECIFIED HEADACHE TYPE: Primary | ICD-10-CM

## 2024-12-06 LAB
ALBUMIN SERPL-MCNC: 4.3 G/DL (ref 3.5–4.6)
ALP SERPL-CCNC: 56 U/L (ref 40–130)
ALT SERPL-CCNC: 14 U/L (ref 0–33)
ANION GAP SERPL CALCULATED.3IONS-SCNC: 16 MEQ/L (ref 9–15)
AST SERPL-CCNC: 20 U/L (ref 0–35)
BASOPHILS # BLD: 0.1 K/UL (ref 0–0.2)
BASOPHILS NFR BLD: 0.8 %
BILIRUB SERPL-MCNC: 0.3 MG/DL (ref 0.2–0.7)
BILIRUB UR QL STRIP: NEGATIVE
BNP BLD-MCNC: 135 PG/ML
BUN SERPL-MCNC: 15 MG/DL (ref 6–20)
CALCIUM SERPL-MCNC: 9.2 MG/DL (ref 8.5–9.9)
CHLORIDE SERPL-SCNC: 106 MEQ/L (ref 95–107)
CLARITY UR: CLEAR
CO2 SERPL-SCNC: 19 MEQ/L (ref 20–31)
COLOR UR: YELLOW
CREAT SERPL-MCNC: 0.62 MG/DL (ref 0.5–0.9)
D DIMER PPP FEU-MCNC: 0.58 MG/L FEU (ref 0–0.5)
EKG ATRIAL RATE: 64 BPM
EKG P AXIS: 61 DEGREES
EKG P-R INTERVAL: 136 MS
EKG Q-T INTERVAL: 444 MS
EKG QRS DURATION: 88 MS
EKG QTC CALCULATION (BAZETT): 458 MS
EKG R AXIS: 17 DEGREES
EKG T AXIS: 34 DEGREES
EKG VENTRICULAR RATE: 64 BPM
EOSINOPHIL # BLD: 0.1 K/UL (ref 0–0.7)
EOSINOPHIL NFR BLD: 0.8 %
ERYTHROCYTE [DISTWIDTH] IN BLOOD BY AUTOMATED COUNT: 12.4 % (ref 11.5–14.5)
GLOBULIN SER CALC-MCNC: 3.4 G/DL (ref 2.3–3.5)
GLUCOSE BLD-MCNC: 92 MG/DL (ref 70–99)
GLUCOSE SERPL-MCNC: 87 MG/DL (ref 70–99)
GLUCOSE UR STRIP-MCNC: NEGATIVE MG/DL
HCT VFR BLD AUTO: 32.5 % (ref 37–47)
HGB BLD-MCNC: 11.1 G/DL (ref 12–16)
HGB UR QL STRIP: NEGATIVE
INFLUENZA A BY PCR: NEGATIVE
INFLUENZA B BY PCR: NEGATIVE
KETONES UR STRIP-MCNC: ABNORMAL MG/DL
LACTATE BLDV-SCNC: 1.9 MMOL/L (ref 0.5–2.2)
LEUKOCYTE ESTERASE UR QL STRIP: NEGATIVE
LYMPHOCYTES # BLD: 1.4 K/UL (ref 1–4.8)
LYMPHOCYTES NFR BLD: 21.7 %
MAGNESIUM SERPL-MCNC: 2 MG/DL (ref 1.7–2.4)
MCH RBC QN AUTO: 30.9 PG (ref 27–31.3)
MCHC RBC AUTO-ENTMCNC: 34.2 % (ref 33–37)
MCV RBC AUTO: 90.5 FL (ref 79.4–94.8)
MONOCYTES # BLD: 0.6 K/UL (ref 0.2–0.8)
MONOCYTES NFR BLD: 8.9 %
NEUTROPHILS # BLD: 4.5 K/UL (ref 1.4–6.5)
NEUTS SEG NFR BLD: 67.5 %
NITRITE UR QL STRIP: NEGATIVE
PERFORMED ON: NORMAL
PH UR STRIP: 8 [PH] (ref 5–9)
PLATELET # BLD AUTO: 268 K/UL (ref 130–400)
POTASSIUM SERPL-SCNC: 3.5 MEQ/L (ref 3.4–4.9)
PROT SERPL-MCNC: 7.7 G/DL (ref 6.3–8)
PROT UR STRIP-MCNC: NEGATIVE MG/DL
RBC # BLD AUTO: 3.59 M/UL (ref 4.2–5.4)
SARS-COV-2 RDRP RESP QL NAA+PROBE: NOT DETECTED
SODIUM SERPL-SCNC: 141 MEQ/L (ref 135–144)
SP GR UR STRIP: 1.01 (ref 1–1.03)
TROPONIN, HIGH SENSITIVITY: <6 NG/L (ref 0–19)
TROPONIN, HIGH SENSITIVITY: <6 NG/L (ref 0–19)
URINE REFLEX TO CULTURE: ABNORMAL
UROBILINOGEN UR STRIP-ACNC: 1 E.U./DL
WBC # BLD AUTO: 6.6 K/UL (ref 4.8–10.8)

## 2024-12-06 PROCEDURE — 80053 COMPREHEN METABOLIC PANEL: CPT

## 2024-12-06 PROCEDURE — 99285 EMERGENCY DEPT VISIT HI MDM: CPT

## 2024-12-06 PROCEDURE — 96374 THER/PROPH/DIAG INJ IV PUSH: CPT

## 2024-12-06 PROCEDURE — 83735 ASSAY OF MAGNESIUM: CPT

## 2024-12-06 PROCEDURE — 83605 ASSAY OF LACTIC ACID: CPT

## 2024-12-06 PROCEDURE — 71045 X-RAY EXAM CHEST 1 VIEW: CPT

## 2024-12-06 PROCEDURE — 6360000004 HC RX CONTRAST MEDICATION: Performed by: STUDENT IN AN ORGANIZED HEALTH CARE EDUCATION/TRAINING PROGRAM

## 2024-12-06 PROCEDURE — 6360000002 HC RX W HCPCS: Performed by: STUDENT IN AN ORGANIZED HEALTH CARE EDUCATION/TRAINING PROGRAM

## 2024-12-06 PROCEDURE — 81003 URINALYSIS AUTO W/O SCOPE: CPT

## 2024-12-06 PROCEDURE — 84484 ASSAY OF TROPONIN QUANT: CPT

## 2024-12-06 PROCEDURE — 83880 ASSAY OF NATRIURETIC PEPTIDE: CPT

## 2024-12-06 PROCEDURE — 85025 COMPLETE CBC W/AUTO DIFF WBC: CPT

## 2024-12-06 PROCEDURE — 96375 TX/PRO/DX INJ NEW DRUG ADDON: CPT

## 2024-12-06 PROCEDURE — 85379 FIBRIN DEGRADATION QUANT: CPT

## 2024-12-06 PROCEDURE — 87635 SARS-COV-2 COVID-19 AMP PRB: CPT

## 2024-12-06 PROCEDURE — 2580000003 HC RX 258: Performed by: STUDENT IN AN ORGANIZED HEALTH CARE EDUCATION/TRAINING PROGRAM

## 2024-12-06 PROCEDURE — 93005 ELECTROCARDIOGRAM TRACING: CPT | Performed by: STUDENT IN AN ORGANIZED HEALTH CARE EDUCATION/TRAINING PROGRAM

## 2024-12-06 PROCEDURE — 71275 CT ANGIOGRAPHY CHEST: CPT

## 2024-12-06 PROCEDURE — 70450 CT HEAD/BRAIN W/O DYE: CPT

## 2024-12-06 PROCEDURE — 87502 INFLUENZA DNA AMP PROBE: CPT

## 2024-12-06 RX ORDER — DIPHENHYDRAMINE HYDROCHLORIDE 50 MG/ML
25 INJECTION INTRAMUSCULAR; INTRAVENOUS ONCE
Status: COMPLETED | OUTPATIENT
Start: 2024-12-06 | End: 2024-12-06

## 2024-12-06 RX ORDER — 0.9 % SODIUM CHLORIDE 0.9 %
250 INTRAVENOUS SOLUTION INTRAVENOUS ONCE
Status: COMPLETED | OUTPATIENT
Start: 2024-12-06 | End: 2024-12-06

## 2024-12-06 RX ORDER — HYDROCORTISONE SODIUM SUCCINATE 100 MG/2ML
250 INJECTION INTRAMUSCULAR; INTRAVENOUS ONCE
Status: COMPLETED | OUTPATIENT
Start: 2024-12-06 | End: 2024-12-06

## 2024-12-06 RX ORDER — IOPAMIDOL 755 MG/ML
75 INJECTION, SOLUTION INTRAVASCULAR
Status: COMPLETED | OUTPATIENT
Start: 2024-12-06 | End: 2024-12-06

## 2024-12-06 RX ORDER — PROCHLORPERAZINE EDISYLATE 5 MG/ML
10 INJECTION INTRAMUSCULAR; INTRAVENOUS ONCE
Status: COMPLETED | OUTPATIENT
Start: 2024-12-06 | End: 2024-12-06

## 2024-12-06 RX ADMIN — DIPHENHYDRAMINE HYDROCHLORIDE 25 MG: 50 INJECTION INTRAMUSCULAR; INTRAVENOUS at 18:55

## 2024-12-06 RX ADMIN — IOPAMIDOL 75 ML: 755 INJECTION, SOLUTION INTRAVENOUS at 20:33

## 2024-12-06 RX ADMIN — HYDROCORTISONE SODIUM SUCCINATE 250 MG: 100 INJECTION, POWDER, FOR SOLUTION INTRAMUSCULAR; INTRAVENOUS at 18:58

## 2024-12-06 RX ADMIN — DIPHENHYDRAMINE HYDROCHLORIDE 25 MG: 50 INJECTION INTRAMUSCULAR; INTRAVENOUS at 18:57

## 2024-12-06 RX ADMIN — SODIUM CHLORIDE 250 ML: 9 INJECTION, SOLUTION INTRAVENOUS at 18:31

## 2024-12-06 RX ADMIN — PROCHLORPERAZINE EDISYLATE 10 MG: 5 INJECTION INTRAMUSCULAR; INTRAVENOUS at 18:55

## 2024-12-06 ASSESSMENT — LIFESTYLE VARIABLES
HOW OFTEN DO YOU HAVE A DRINK CONTAINING ALCOHOL: NEVER
HOW MANY STANDARD DRINKS CONTAINING ALCOHOL DO YOU HAVE ON A TYPICAL DAY: PATIENT DOES NOT DRINK

## 2024-12-06 ASSESSMENT — PAIN - FUNCTIONAL ASSESSMENT: PAIN_FUNCTIONAL_ASSESSMENT: 0-10

## 2024-12-06 ASSESSMENT — PAIN DESCRIPTION - DESCRIPTORS: DESCRIPTORS: PRESSURE

## 2024-12-06 ASSESSMENT — PAIN DESCRIPTION - LOCATION: LOCATION: CHEST

## 2024-12-06 ASSESSMENT — VISUAL ACUITY: OU: 1

## 2024-12-06 ASSESSMENT — PAIN DESCRIPTION - PAIN TYPE: TYPE: ACUTE PAIN

## 2024-12-06 ASSESSMENT — PAIN SCALES - GENERAL: PAINLEVEL_OUTOF10: 9

## 2024-12-06 ASSESSMENT — PAIN DESCRIPTION - ORIENTATION: ORIENTATION: MID

## 2024-12-06 NOTE — ED PROVIDER NOTES
limits    Narrative:     CALL  [facility] tel. [phone],  DIMER results called to ED and read back by Dr. Wei Chaudhary, 12/06/2024  18:40, by [facility]   COVID-19, RAPID   RAPID INFLUENZA A/B ANTIGENS   LACTIC ACID   MAGNESIUM   TROPONIN   BRAIN NATRIURETIC PEPTIDE   TROPONIN   URINALYSIS WITH REFLEX TO CULTURE   POCT GLUCOSE       XR CHEST PORTABLE   Final Result   No acute process.         CT HEAD WO CONTRAST    (Results Pending)   CTA CHEST W WO CONTRAST    (Results Pending)       ED Course as of 12/06/24 2029   Fri Dec 06, 2024   1812 EKG 12 Lead  EKG showing normal sinus rhythm, rate of 64 bpm.  Normal axis, normal intervals, no acute ST-T wave abnormalities.  Normal EKG. [NA]   1900 XR CHEST PORTABLE  CXR showing no gross acute cardiopulmonary abnormalities. [NA]      ED Course User Index  [NA] Nikko Siu MD       43 y.o. female with a PMH clinically significant for HTN, HLD, Obesity, NANDO, Hypothyroidism, Migraine HA's, Anxiety, Depression, and PTSD, S/P Bariatric Surgery presenting to the ED via EMS c/o multiple complaints including chest pain, shortness of breath, lightheadedness, dizziness, headache, tingling and feeling like she was going to pass out.  Upon initial evaluation, Pt anxious, but otherwise Afebrile, HDS, grossly NV Intact, and satting normally on RA. PE as noted above.  Labs, EKG, and Imaging visualized and interpreted by myself as noted above in ED Course.  Given findings, clinical presentation most likely consistent w/ possible panic attack versus migraine headache versus musculoskeletal chest pain.  Although patient endorsing possible heaviness in the left side of face, no facial asymmetry or other focal neurological deficits appreciated in the ED.  Low suspicion for CVA at this time.  Will obtain CT head to evaluate further.  Concern for possible atypical migraine versus vertigo with the patient's lightheadedness and dizziness.  Patient also endorsing near syncopal episode with  chest pain, shortness of breath and palpitations.  Therefore considered atypical ACS versus arrhythmias versus PE on differential diagnosis as well.  No acute ischemic changes on EKG and initial high-sensitivity troponin negative, lower suspicion for ACS at this time.  Repeat troponin is still pending however.  Lower suspicion for PE, but patient did recently have major surgery.  Therefore obtained D-dimer which was positive.  Patient premedicated and will receive CTA of the chest to further rule out PE.  Chest x-ray without evidence of acute cardiopulmonary abnormalities, not consistent with PTX, pneumonia or acute decompensated heart failure.  Labs largely unremarkable.  Panic attack is maintained on differential diagnoses with patient reporting hyperventilation with tingling in bilateral fingers.  Administered meds for atypical migraine in the ED as noted below.  Patient did have improved symptoms upon reevaluation.   Pt was administered   Medications   iopamidol (ISOVUE-370) 76 % injection 75 mL (has no administration in time range)   sodium chloride 0.9 % bolus 250 mL (250 mLs IntraVENous New Bag 12/6/24 1831)   prochlorperazine (COMPAZINE) injection 10 mg (10 mg IntraVENous Given 12/6/24 1855)   diphenhydrAMINE (BENADRYL) injection 25 mg (25 mg IntraVENous Given 12/6/24 1855)   hydrocortisone sodium succinate PF (SOLU-CORTEF) injection 250 mg (250 mg IntraVENous Given 12/6/24 1858)   diphenhydrAMINE (BENADRYL) injection 25 mg (25 mg IntraVENous Given 12/6/24 1857)       Plan: Follow up pending trop, UA and CT imaging. If patient feeling improved and no significant abnl findings, likely stable for further evaluation and management as an outpatient.    Signed out to Dr. Espinoza at 7PM sign out with plan as noted above.      CRITICAL CARE TIME   None    FINAL IMPRESSION      1. Acute nonintractable headache, unspecified headache type    2. Atypical chest pain    3. Anxiety state          DISPOSITION/PLAN

## 2024-12-06 NOTE — ED NOTES
Pt came via [facility] for [facility] pain  Pt has no known cardiac history and has had these symptoms in the past but states \"nothing was wrong\" and she was discharged  Hypertensive on assessment at 173/97   Denies headache   Afebrile  95% on RA   Rates chest pain at 9/10 and describes it as a \"pressing\" pain.

## 2024-12-07 NOTE — ED PROVIDER NOTES
Patient signed out to me at 7PM by Dr. Siu. Initial evaluation and presentation as documented in their full ED note.   Christina Olivier is a 43 y.o. female with a PMH clinically significant for hypertension, hyperlipidemia, hypothyroidism, migraines, anxiety, PTSD nitially presenting to the ED c/o multiple complaints, including chest pain, shortness of breath.    Pending: Labs and imaging  Plan: Disposition    ED Course as of 12/07/24 0704   Fri Dec 06, 2024   1812 EKG 12 Lead  EKG showing normal sinus rhythm, rate of 64 bpm.  Normal axis, normal intervals, no acute ST-T wave abnormalities.  Normal EKG. [NA]   1900 XR CHEST PORTABLE  CXR showing no gross acute cardiopulmonary abnormalities. [NA]      ED Course User Index  [NA] Nikko Siu MD       Labs, EKG, and Imaging visualized and interpreted by myself as noted above in ED Course.  Given findings, clinical presentation thought most likely consistent w/   Pt was administered   Medications   sodium chloride 0.9 % bolus 250 mL (0 mLs IntraVENous Stopped 12/6/24 2200)   prochlorperazine (COMPAZINE) injection 10 mg (10 mg IntraVENous Given 12/6/24 1855)   diphenhydrAMINE (BENADRYL) injection 25 mg (25 mg IntraVENous Given 12/6/24 1855)   hydrocortisone sodium succinate PF (SOLU-CORTEF) injection 250 mg (250 mg IntraVENous Given 12/6/24 1858)   diphenhydrAMINE (BENADRYL) injection 25 mg (25 mg IntraVENous Given 12/6/24 1857)   iopamidol (ISOVUE-370) 76 % injection 75 mL (75 mLs IntraVENous Given 12/6/24 2033)         Patient has been independent evaluated by myself.  Patient's family members also at bedside.  Patient upon my evaluation is AOx4, afebrile hemodynamically stable.  Patient denies any current complaints right now, denies any tingling to the face, denies any focal neurological deficits, denies any chest pain, denies any shortness of breath.  EKG showed sinus rhythm with a rate of 64.  Normal axis, no abnormal intervals.  2 sets of

## 2024-12-09 LAB
EKG ATRIAL RATE: 64 BPM
EKG P AXIS: 61 DEGREES
EKG P-R INTERVAL: 136 MS
EKG Q-T INTERVAL: 444 MS
EKG QRS DURATION: 88 MS
EKG QTC CALCULATION (BAZETT): 458 MS
EKG R AXIS: 17 DEGREES
EKG T AXIS: 34 DEGREES
EKG VENTRICULAR RATE: 64 BPM

## 2024-12-09 PROCEDURE — 93010 ELECTROCARDIOGRAM REPORT: CPT | Performed by: INTERNAL MEDICINE

## 2024-12-12 NOTE — TELEPHONE ENCOUNTER
Stress and echo ordered.   Attempted to call patient to notify her of new orders, no answer, voicemail left for patient to call back.

## 2024-12-12 NOTE — TELEPHONE ENCOUNTER
She had a negative stress test in 2022.  Please have patient obtain stress echo and see Tami afterwards.  We may need to have an updated stress test in the last couple years before the except cardiac CT angiogram

## 2024-12-13 ENCOUNTER — PATIENT MESSAGE (OUTPATIENT)
Dept: CARDIOLOGY CLINIC | Age: 44
End: 2024-12-13

## 2024-12-17 NOTE — TELEPHONE ENCOUNTER
Burt attempted to call patient back to notify her of her stress test and echo that is scheduled for 01/23/2025 since her Cardiac CTA was denied. No answer from patient, voicemail left for patient.

## 2025-01-10 ENCOUNTER — TELEPHONE (OUTPATIENT)
Dept: CARDIOLOGY CLINIC | Age: 45
End: 2025-01-10

## 2025-01-10 NOTE — TELEPHONE ENCOUNTER
PER DR PURVIS SINCE STRESS TEST, ECHO AND THE CTA HAVE BEEN DENIED BY INS. IF PATIENT HAS ANY SYMPOTMS TO CALL OFFICE TO MAKE APPOINTMENT AND GO FROM THERE SINCE TESTS WERE DENIED BY INS    SPOKE WITH PATIENT'S DAUGHTER AWARE AND WILL NOTIFY PATIENT

## 2025-01-20 ENCOUNTER — TELEPHONE (OUTPATIENT)
Dept: CARDIOLOGY CLINIC | Age: 45
End: 2025-01-20

## 2025-01-20 NOTE — TELEPHONE ENCOUNTER
Appointment Request From: Christina Olivier      With Provider: Dr. Eduardo Maciel DO [[facility] [place]]      Preferred Date Range: 1/20/2025 - 1/23/2025      Preferred Times: Any Time      Reason for visit: Request an Appointment      Comments:   I ALREADY ARRIVED FROM [place], I STILL HAVE THE SAME SYMPTOMS,     Appointment Request  (Newest Message First)  Christina Olivier \"Christina Feliz\"  P [facility] Front Desk37 minutes ago (11:55 AM)       Appointment Request From: Christina Olivier     With Provider: Dr. Eduardo Maciel DO [[facility] [place]]     Preferred Date Range: 1/20/2025 - 1/23/2025     Preferred Times: Any Time     Reason for visit: Request an Appointment     Comments:  I ALREADY ARRIVED FROM [place], I STILL HAVE THE SAME SYMPTOMS,

## 2025-02-03 ENCOUNTER — OFFICE VISIT (OUTPATIENT)
Dept: PULMONOLOGY | Age: 45
End: 2025-02-03
Payer: COMMERCIAL

## 2025-02-03 VITALS
BODY MASS INDEX: 26.62 KG/M2 | SYSTOLIC BLOOD PRESSURE: 132 MMHG | DIASTOLIC BLOOD PRESSURE: 80 MMHG | WEIGHT: 170 LBS | HEART RATE: 55 BPM | OXYGEN SATURATION: 100 %

## 2025-02-03 DIAGNOSIS — R06.02 SHORTNESS OF BREATH: ICD-10-CM

## 2025-02-03 DIAGNOSIS — G47.33 OSA (OBSTRUCTIVE SLEEP APNEA): Primary | ICD-10-CM

## 2025-02-03 PROCEDURE — G8427 DOCREV CUR MEDS BY ELIG CLIN: HCPCS | Performed by: INTERNAL MEDICINE

## 2025-02-03 PROCEDURE — 99214 OFFICE O/P EST MOD 30 MIN: CPT | Performed by: INTERNAL MEDICINE

## 2025-02-03 PROCEDURE — G8419 CALC BMI OUT NRM PARAM NOF/U: HCPCS | Performed by: INTERNAL MEDICINE

## 2025-02-03 PROCEDURE — 1036F TOBACCO NON-USER: CPT | Performed by: INTERNAL MEDICINE

## 2025-02-03 ASSESSMENT — ENCOUNTER SYMPTOMS
VOMITING: 0
DIARRHEA: 0
COUGH: 0
ABDOMINAL PAIN: 0
NAUSEA: 0
EYE ITCHING: 0
WHEEZING: 0
VOICE CHANGE: 0
RHINORRHEA: 0
SINUS PRESSURE: 0
CHEST TIGHTNESS: 0
SORE THROAT: 0
TROUBLE SWALLOWING: 0
EYE DISCHARGE: 0
SHORTNESS OF BREATH: 1

## 2025-02-03 NOTE — PROGRESS NOTES
Subjective:             Christina Olivier is a 44 y.o. female who complains today of:     Chief Complaint   Patient presents with    Follow-up     Pt is not using old CPAP, she needs Rx sent for new CPAP to [facility]  She had PSG done on 7/30/24 AHI 10.5 hour. AHI REM 35   She had old CPAP , broken , she said she qualified for new CPAP in November and want have prescription for [facility] .   She had bariatric surgery at [facility] 12/21/20 lost 123 lbs . But gain some weight back she was in [place] for vacation.   She said she does not sleep well .     She said she is having shortness of breath , she said she feels dizzy at times.     No cough with sputum No wheezing   C/o chest pain and neck pain, feels like restriction in chest taking deep breath .   Negative cardiac workup  in aug 2024  No smoking      PFT 9/12/24 Spirometry is overall normal.  There is no significant response to bronchodilators in FEV1 or FVC.  However, there is good response in mid flows.  Total lung capacity is normal. Diffusion capacity is normal.     CT chest 10/3/24  The lungs are clear.  No consolidating pneumonia, pneumothorax or pleural  effusion is seen.      Allergies:  Dye [iodides], Nsaids, and Red dye #40 (allura red)  Past Medical History:   Diagnosis Date    Allergic rhinitis     Depressive disorder     Dyspnea on exertion 05/25/2023    History of bariatric surgery 05/25/2023    Hypertension     Hypothyroidism     Inverted nipple     bilateral - normal for her    Migraine     Obesity     NANDO (obstructive sleep apnea)     Osteoarthritis     Precordial pain 10/20/2022    PTSD (post-traumatic stress disorder)      Past Surgical History:   Procedure Laterality Date    GASTRIC BYPASS SURGERY  2020    HYSTERECTOMY (CERVIX STATUS UNKNOWN)      Partial-still has 1 ovary    JOINT REPLACEMENT Left     LAPAROSCOPY N/A 11/11/2024    ROBOTIC ABDOMINAL EXPLORATION WITH CLOSURE OF INTERNAL

## 2025-02-11 ENCOUNTER — OFFICE VISIT (OUTPATIENT)
Dept: CARDIOLOGY CLINIC | Age: 45
End: 2025-02-11

## 2025-02-11 VITALS
RESPIRATION RATE: 16 BRPM | DIASTOLIC BLOOD PRESSURE: 84 MMHG | SYSTOLIC BLOOD PRESSURE: 132 MMHG | HEART RATE: 54 BPM | WEIGHT: 175 LBS | BODY MASS INDEX: 27.4 KG/M2 | OXYGEN SATURATION: 99 %

## 2025-02-11 DIAGNOSIS — R06.02 SHORTNESS OF BREATH: ICD-10-CM

## 2025-02-11 DIAGNOSIS — R00.2 PALPITATIONS: ICD-10-CM

## 2025-02-11 DIAGNOSIS — R07.2 PRECORDIAL PAIN: Primary | ICD-10-CM

## 2025-02-11 DIAGNOSIS — G47.33 OSA (OBSTRUCTIVE SLEEP APNEA): ICD-10-CM

## 2025-02-11 DIAGNOSIS — R07.9 CHEST PAIN, UNSPECIFIED TYPE: ICD-10-CM

## 2025-02-11 DIAGNOSIS — I20.9 ANGINA PECTORIS (HCC): ICD-10-CM

## 2025-02-11 NOTE — PROGRESS NOTES
Patient: Christina Olivier  YOB: 1980  MRN: [id number]    Chief Complaint:   Chief Complaint   Patient presents with    Chest Pain     PRECORDIAL PAIN-TESTING DENIED  Worsening symptoms    Shortness of Breath    Dizziness         Subjective/HPI   8/19/2022: Patient seen for initial medical evaluation.  Patient with past medical history hypothyroidism, obesity, NANDO, PTSD.  Patient seen today per her request for bradycardia.  States she had an EKG at PCPs office that showed heart rate in the 40s.  EKG in office today shows normal sinus rhythm, heart rate in the 60s.  Patient also with complaints of intermittent, midsternal chest pain/pressure that radiates to left arm.  States that sometimes pain feels like a burning or squeezing.  Reports shortness of breath with exertion.  Denies any other associated symptoms.  Denies aggravating or alleviating factors.  Patient also reports intermittent palpitations and feeling like her heart is skipping beats.    Pt denies  change in exercise capacity, fatigue,  nausea, vomiting, diarrhea, constipation, motor weakness, insomnia, weight loss, syncope, dizziness, lightheadedness, PND, orthopnea, or claudication. No bleeding issues. Pt denies CHF type symptoms.  No recent hospitalizations.  Pt is compliant with all Rx medications.  Blood pressure and heart rate are under control.      8/30/2022: pt here for follow up visit.  Was in the ED on 8/28 with chest pain. States she was sitting in a movie when pain started. Work up at that time was unremarkable, troponins negative x 2, CP was reproducible.   Pt was given SL Nitro x 2 with relief of pain.  Denies any further episodes of chest pain. Pt states her BP has been running high lately.  States she feels her HR going fast sometimes.  Pt is scheduled for stress, echo and event monitor in the next few weeks      10-20-22: Status post echocardiogram in October 12, 2022 with normal LV function ejection fraction of 55% no

## 2025-02-18 ENCOUNTER — HOSPITAL ENCOUNTER (OUTPATIENT)
Age: 45
Discharge: HOME | End: 2025-02-20
Attending: INTERNAL MEDICINE
Payer: COMMERCIAL

## 2025-02-18 DIAGNOSIS — I20.9 ANGINA PECTORIS: ICD-10-CM

## 2025-02-18 DIAGNOSIS — R06.02 SHORTNESS OF BREATH: ICD-10-CM

## 2025-02-18 LAB
ECHO LV EF PHYSICIAN: 55 %
STRESS BASELINE DIAS BP: 83 MMHG
STRESS BASELINE HR: 81 BPM
STRESS BASELINE ST DEPRESSION: 0 MM
STRESS BASELINE SYS BP: 140 MMHG
STRESS ESTIMATED WORKLOAD: 7 METS
STRESS EXERCISE DUR MIN: 5 MIN
STRESS EXERCISE DUR SEC: 47 SEC
STRESS PEAK DIAS BP: 87 MMHG
STRESS PEAK SYS BP: 177 MMHG
STRESS PERCENT HR ACHIEVED: 117 %
STRESS POST PEAK HR: 203 BPM
STRESS RATE PRESSURE PRODUCT: NORMAL BPM*MMHG
STRESS ST DEPRESSION: 1 MM
STRESS TARGET HR: 174 BPM

## 2025-02-18 PROCEDURE — 93350 STRESS TTE ONLY: CPT

## 2025-02-18 PROCEDURE — 93320 DOPPLER ECHO COMPLETE: CPT

## 2025-02-20 ENCOUNTER — TELEPHONE (OUTPATIENT)
Dept: CARDIOLOGY CLINIC | Age: 45
End: 2025-02-20

## 2025-02-20 NOTE — TELEPHONE ENCOUNTER
Patient scheduled for appointment with Tami 02/24/2025 3pm. Patient confirmed date and time of appointment.     ----- Message from Dr. Steven Khan DO sent at 2/18/2025  6:09 PM EST -----  Regarding: Please make f/u appt with Dr. Janell WHITE to discuss abnormal stress test  Hi    Please make f/u appt with Dr. Janell WHITE to discuss abnormal stress test.     Patient with positive stress ECG but negative echocardiographic study due to quickly falling out of target. Also with exercise induced chest pain.     Thanks  LM

## 2025-02-24 ENCOUNTER — OFFICE VISIT (OUTPATIENT)
Dept: CARDIOLOGY CLINIC | Age: 45
End: 2025-02-24

## 2025-02-24 VITALS
BODY MASS INDEX: 27.09 KG/M2 | WEIGHT: 173 LBS | DIASTOLIC BLOOD PRESSURE: 80 MMHG | HEART RATE: 68 BPM | SYSTOLIC BLOOD PRESSURE: 110 MMHG

## 2025-02-24 DIAGNOSIS — R07.89 CHEST PAIN, ATYPICAL: ICD-10-CM

## 2025-02-24 DIAGNOSIS — R07.9 CHEST PAIN, UNSPECIFIED TYPE: Primary | ICD-10-CM

## 2025-02-24 DIAGNOSIS — I20.9 TYPICAL ANGINA: ICD-10-CM

## 2025-02-24 RX ORDER — SODIUM CHLORIDE 0.9 % (FLUSH) 0.9 %
5-40 SYRINGE (ML) INJECTION PRN
OUTPATIENT
Start: 2025-02-24

## 2025-02-24 RX ORDER — SODIUM CHLORIDE 9 MG/ML
INJECTION, SOLUTION INTRAVENOUS PRN
OUTPATIENT
Start: 2025-02-24

## 2025-02-24 RX ORDER — SODIUM CHLORIDE 0.9 % (FLUSH) 0.9 %
5-40 SYRINGE (ML) INJECTION EVERY 12 HOURS SCHEDULED
OUTPATIENT
Start: 2025-02-24

## 2025-02-24 NOTE — H&P (VIEW-ONLY)
protocol and achieved the target heart rate however quickly fell out of target during stress image acquisition. The patient reports chest pressure while on treadmill and SOB. At completion of testing patient reports chest pressure resolved. Hemodynamics are adequate for diagnosis. Blood pressure demonstrated a normal response and heart rate demonstrated a normal response to stress.    Left Ventricle: Normal left ventricular systolic function with a visually estimated EF of 50 - 55% at rest.    Image quality is adequate.      Patient still complaints of chest pain. Reports intermittent chest pain, symptoms worsening and across her chest . Reports stabbing/pressure like, lasting over 20 minutes, relieved with rest exacerbated by exertion.   Also reports consistent palpitations, with most recent episode on Saturday as sh was driving.            Allergies   Allergen Reactions    Dye [Iodides] Rash    Nsaids Other (See Comments)       NSAIDs are contraindicated with gastric bypass anatomy due to risk of marginal ulcer formation and perforation.  This does not include baby aspirin (81 mg a day).      Red Dye #40 (Allura Red) Hives       Current Outpatient Medications   Medication Sig Dispense Refill    Calcium Citrate-Vitamin D (CALCIUM CITRATE + PO) Take by mouth      Calcium Citrate-Vitamin D (CALCIUM + VIT D, BARIATRIC ADVANTAGE, CHEWABLE TABLET) Take 2 tablets by mouth 2 times daily 90 tablet 3    ondansetron (ZOFRAN-ODT) 4 MG disintegrating tablet Take 1 tablet by mouth every 8 hours as needed for Nausea or Vomiting 30 tablet 1    acetaminophen (TYLENOL) 500 MG tablet Take 1 tablet by mouth 4 times daily as needed for Pain 120 tablet 0    simethicone (MYLICON) 80 MG chewable tablet Take 1 tablet by mouth 4 times daily as needed for Flatulence 180 tablet 3    nitroGLYCERIN (NITROSTAT) 0.4 MG SL tablet PLACE 1 TABLET UNDER THE TONGUE EVERY 5 MINUTES AS NEEDED FOR CHEST PAIN 25 tablet 3    vitamin B-12 (CYANOCOBALAMIN)  12/06/2024 05:52 PM    CO2 19 12/06/2024 05:52 PM    BUN 15 12/06/2024 05:52 PM    CREATININE 0.62 12/06/2024 05:52 PM    GFRAA >60.0 08/28/2022 11:15 PM    LABGLOM >90.0 12/06/2024 05:52 PM    LABGLOM >60.0 08/06/2023 05:15 PM    GLUCOSE 87 12/06/2024 05:52 PM    CALCIUM 9.2 12/06/2024 05:52 PM    BILITOT 0.3 12/06/2024 05:52 PM    ALKPHOS 56 12/06/2024 05:52 PM    AST 20 12/06/2024 05:52 PM    ALT 14 12/06/2024 05:52 PM     BMP:    Lab Results   Component Value Date/Time     12/06/2024 05:52 PM    K 3.5 12/06/2024 05:52 PM    K 3.9 08/06/2023 05:15 PM     12/06/2024 05:52 PM    CO2 19 12/06/2024 05:52 PM    BUN 15 12/06/2024 05:52 PM    CREATININE 0.62 12/06/2024 05:52 PM    CALCIUM 9.2 12/06/2024 05:52 PM    GFRAA >60.0 08/28/2022 11:15 PM    LABGLOM >90.0 12/06/2024 05:52 PM    LABGLOM >60.0 08/06/2023 05:15 PM    GLUCOSE 87 12/06/2024 05:52 PM     Magnesium:    Lab Results   Component Value Date/Time    MG 2.0 12/06/2024 05:52 PM     TSH:  Lab Results   Component Value Date    TSH 3.200 05/01/2023     .result  No results for input(s): \"PROBNP\" in the last 72 hours.  No results for input(s): \"INR\" in the last 72 hours.      Patient Active Problem List   Diagnosis    PTSD (post-traumatic stress disorder)    NANDO (obstructive sleep apnea)    Hypothyroidism    Precordial pain    History of bariatric surgery    Dyspnea on exertion    Periumbilical abdominal pain    Hx of bariatric surgery    Internal hernia       Assessment   Diagnosis Orders   1. Chest pain, unspecified type        2. Typical angina        3. Chest pain, atypical            2. Bradycardia.     No orders of the defined types were placed in this encounter.       No orders of the defined types were placed in this encounter.         No follow-ups on file.      Plan      Follow up with PCP for labs      Will plan for stress echocardiogram given ongoing symptoms of typical/atypical anginal symptoms.  Based on results she will need to undergo

## 2025-02-24 NOTE — PROGRESS NOTES
Patient: Christina Olivier  YOB: 1980  MRN: [id number]    Chief Complaint:   Chief Complaint   Patient presents with    Results     STRESS TEST         Subjective/HPI   8/19/2022: Patient seen for initial medical evaluation.  Patient with past medical history hypothyroidism, obesity, NANDO, PTSD.  Patient seen today per her request for bradycardia.  States she had an EKG at PCPs office that showed heart rate in the 40s.  EKG in office today shows normal sinus rhythm, heart rate in the 60s.  Patient also with complaints of intermittent, midsternal chest pain/pressure that radiates to left arm.  States that sometimes pain feels like a burning or squeezing.  Reports shortness of breath with exertion.  Denies any other associated symptoms.  Denies aggravating or alleviating factors.  Patient also reports intermittent palpitations and feeling like her heart is skipping beats.    Pt denies  change in exercise capacity, fatigue,  nausea, vomiting, diarrhea, constipation, motor weakness, insomnia, weight loss, syncope, dizziness, lightheadedness, PND, orthopnea, or claudication. No bleeding issues. Pt denies CHF type symptoms.  No recent hospitalizations.  Pt is compliant with all Rx medications.  Blood pressure and heart rate are under control.      8/30/2022: pt here for follow up visit.  Was in the ED on 8/28 with chest pain. States she was sitting in a movie when pain started. Work up at that time was unremarkable, troponins negative x 2, CP was reproducible.   Pt was given SL Nitro x 2 with relief of pain.  Denies any further episodes of chest pain. Pt states her BP has been running high lately.  States she feels her HR going fast sometimes.  Pt is scheduled for stress, echo and event monitor in the next few weeks      10-20-22: Status post echocardiogram in October 12, 2022 with normal LV function ejection fraction of 55% no valve abnormality.  Status post nuclear stress test with normal perfusion no

## 2025-03-03 ENCOUNTER — TELEPHONE (OUTPATIENT)
Dept: CARDIOLOGY CLINIC | Age: 45
End: 2025-03-03

## 2025-03-03 DIAGNOSIS — I20.9 ANGINA PECTORIS: ICD-10-CM

## 2025-03-03 DIAGNOSIS — R07.2 PRECORDIAL PAIN: Primary | ICD-10-CM

## 2025-03-03 RX ORDER — PANTOPRAZOLE SODIUM 40 MG/1
40 TABLET, DELAYED RELEASE ORAL
Qty: 90 TABLET | Refills: 3 | Status: SHIPPED | OUTPATIENT
Start: 2025-03-03

## 2025-03-04 NOTE — PROGRESS NOTES
Pt was called and is aware of procedure june. Morning. Arrive @730AM, NPO after midnight, must have .

## 2025-03-05 ENCOUNTER — HOSPITAL ENCOUNTER (OUTPATIENT)
Age: 45
Setting detail: OUTPATIENT SURGERY
Discharge: HOME | End: 2025-03-05
Attending: INTERNAL MEDICINE | Admitting: INTERNAL MEDICINE
Payer: COMMERCIAL

## 2025-03-05 VITALS
BODY MASS INDEX: 26.53 KG/M2 | SYSTOLIC BLOOD PRESSURE: 119 MMHG | OXYGEN SATURATION: 99 % | WEIGHT: 169 LBS | RESPIRATION RATE: 22 BRPM | DIASTOLIC BLOOD PRESSURE: 69 MMHG | HEART RATE: 59 BPM | HEIGHT: 67 IN | TEMPERATURE: 97.9 F

## 2025-03-05 DIAGNOSIS — I20.9 ANGINA PECTORIS: ICD-10-CM

## 2025-03-05 LAB
ANION GAP SERPL CALCULATED.3IONS-SCNC: 12 MEQ/L (ref 9–15)
BUN SERPL-MCNC: 17 MG/DL (ref 6–20)
CALCIUM SERPL-MCNC: 8.8 MG/DL (ref 8.5–9.9)
CHLORIDE SERPL-SCNC: 106 MEQ/L (ref 95–107)
CO2 SERPL-SCNC: 23 MEQ/L (ref 20–31)
CREAT SERPL-MCNC: 0.68 MG/DL (ref 0.5–0.9)
ERYTHROCYTE [DISTWIDTH] IN BLOOD BY AUTOMATED COUNT: 12.2 % (ref 11.5–14.5)
GLUCOSE SERPL-MCNC: 94 MG/DL (ref 70–99)
HCT VFR BLD AUTO: 32.8 % (ref 37–47)
HGB BLD-MCNC: 11.1 G/DL (ref 12–16)
MCH RBC QN AUTO: 32 PG (ref 27–31.3)
MCHC RBC AUTO-ENTMCNC: 33.8 % (ref 33–37)
MCV RBC AUTO: 94.5 FL (ref 79.4–94.8)
PLATELET # BLD AUTO: 278 K/UL (ref 130–400)
POTASSIUM SERPL-SCNC: 3.7 MEQ/L (ref 3.4–4.9)
RBC # BLD AUTO: 3.47 M/UL (ref 4.2–5.4)
SODIUM SERPL-SCNC: 141 MEQ/L (ref 135–144)
WBC # BLD AUTO: 3.8 K/UL (ref 4.8–10.8)

## 2025-03-05 PROCEDURE — 85027 COMPLETE CBC AUTOMATED: CPT

## 2025-03-05 PROCEDURE — 93458 L HRT ARTERY/VENTRICLE ANGIO: CPT | Performed by: INTERNAL MEDICINE

## 2025-03-05 PROCEDURE — 2709999900 HC NON-CHARGEABLE SUPPLY: Performed by: INTERNAL MEDICINE

## 2025-03-05 PROCEDURE — 2580000003 HC RX 258: Performed by: INTERNAL MEDICINE

## 2025-03-05 PROCEDURE — 93005 ELECTROCARDIOGRAM TRACING: CPT | Performed by: INTERNAL MEDICINE

## 2025-03-05 PROCEDURE — 6360000002 HC RX W HCPCS: Performed by: INTERNAL MEDICINE

## 2025-03-05 PROCEDURE — 7100000011 HC PHASE II RECOVERY - ADDTL 15 MIN: Performed by: INTERNAL MEDICINE

## 2025-03-05 PROCEDURE — C1769 GUIDE WIRE: HCPCS | Performed by: INTERNAL MEDICINE

## 2025-03-05 PROCEDURE — 7100000010 HC PHASE II RECOVERY - FIRST 15 MIN: Performed by: INTERNAL MEDICINE

## 2025-03-05 PROCEDURE — 80048 BASIC METABOLIC PNL TOTAL CA: CPT

## 2025-03-05 PROCEDURE — C1894 INTRO/SHEATH, NON-LASER: HCPCS | Performed by: INTERNAL MEDICINE

## 2025-03-05 PROCEDURE — 6360000004 HC RX CONTRAST MEDICATION: Performed by: INTERNAL MEDICINE

## 2025-03-05 PROCEDURE — 2500000003 HC RX 250 WO HCPCS: Performed by: INTERNAL MEDICINE

## 2025-03-05 RX ORDER — SODIUM CHLORIDE 9 MG/ML
INJECTION, SOLUTION INTRAVENOUS PRN
Status: DISCONTINUED | OUTPATIENT
Start: 2025-03-05 | End: 2025-03-05 | Stop reason: HOSPADM

## 2025-03-05 RX ORDER — SODIUM CHLORIDE 0.9 % (FLUSH) 0.9 %
5-40 SYRINGE (ML) INJECTION PRN
Status: DISCONTINUED | OUTPATIENT
Start: 2025-03-05 | End: 2025-03-05 | Stop reason: HOSPADM

## 2025-03-05 RX ORDER — RANOLAZINE 500 MG/1
500 TABLET, EXTENDED RELEASE ORAL 2 TIMES DAILY
Qty: 60 TABLET | Refills: 3 | Status: SHIPPED | OUTPATIENT
Start: 2025-03-05

## 2025-03-05 RX ORDER — HYDROCORTISONE SODIUM SUCCINATE 100 MG/2ML
200 INJECTION INTRAMUSCULAR; INTRAVENOUS ONCE
Status: COMPLETED | OUTPATIENT
Start: 2025-03-05 | End: 2025-03-05

## 2025-03-05 RX ORDER — MIDAZOLAM HYDROCHLORIDE 1 MG/ML
INJECTION, SOLUTION INTRAMUSCULAR; INTRAVENOUS PRN
Status: DISCONTINUED | OUTPATIENT
Start: 2025-03-05 | End: 2025-03-05 | Stop reason: HOSPADM

## 2025-03-05 RX ORDER — DIPHENHYDRAMINE HYDROCHLORIDE 50 MG/ML
50 INJECTION INTRAMUSCULAR; INTRAVENOUS ONCE
Status: COMPLETED | OUTPATIENT
Start: 2025-03-05 | End: 2025-03-05

## 2025-03-05 RX ORDER — IOPAMIDOL 612 MG/ML
INJECTION, SOLUTION INTRAVASCULAR PRN
Status: DISCONTINUED | OUTPATIENT
Start: 2025-03-05 | End: 2025-03-05 | Stop reason: HOSPADM

## 2025-03-05 RX ORDER — FENTANYL CITRATE 50 UG/ML
INJECTION, SOLUTION INTRAMUSCULAR; INTRAVENOUS PRN
Status: DISCONTINUED | OUTPATIENT
Start: 2025-03-05 | End: 2025-03-05 | Stop reason: HOSPADM

## 2025-03-05 RX ORDER — SODIUM CHLORIDE 0.9 % (FLUSH) 0.9 %
5-40 SYRINGE (ML) INJECTION EVERY 12 HOURS SCHEDULED
Status: DISCONTINUED | OUTPATIENT
Start: 2025-03-05 | End: 2025-03-05 | Stop reason: HOSPADM

## 2025-03-05 RX ORDER — HEPARIN SODIUM 1000 [USP'U]/ML
INJECTION, SOLUTION INTRAVENOUS; SUBCUTANEOUS PRN
Status: DISCONTINUED | OUTPATIENT
Start: 2025-03-05 | End: 2025-03-05 | Stop reason: HOSPADM

## 2025-03-05 RX ORDER — NITROGLYCERIN 20 MG/100ML
INJECTION INTRAVENOUS CONTINUOUS PRN
Status: COMPLETED | OUTPATIENT
Start: 2025-03-05 | End: 2025-03-05

## 2025-03-05 RX ADMIN — SODIUM CHLORIDE, PRESERVATIVE FREE 20 MG: 5 INJECTION INTRAVENOUS at 08:04

## 2025-03-05 RX ADMIN — DIPHENHYDRAMINE HYDROCHLORIDE 50 MG: 50 INJECTION INTRAMUSCULAR; INTRAVENOUS at 08:05

## 2025-03-05 RX ADMIN — HYDROCORTISONE SODIUM SUCCINATE 200 MG: 100 INJECTION INTRAMUSCULAR; INTRAVENOUS at 08:05

## 2025-03-05 ASSESSMENT — PAIN SCALES - GENERAL
PAINLEVEL_OUTOF10: 6
PAINLEVEL_OUTOF10: 6

## 2025-03-05 ASSESSMENT — PAIN DESCRIPTION - FREQUENCY: FREQUENCY: CONTINUOUS

## 2025-03-05 ASSESSMENT — PAIN DESCRIPTION - LOCATION
LOCATION: CHEST
LOCATION: CHEST

## 2025-03-05 ASSESSMENT — PAIN DESCRIPTION - ORIENTATION: ORIENTATION: MID

## 2025-03-05 NOTE — PROGRESS NOTES
0715 - Patient checked into pre/post, IV's placed, labs sent, EKG SB, Consents confirmed  0830 - patient to procedure  0910 - patient back from procedure  0910 - R wrist stable, VSS continue to monitor   1000 - Outer band off, R wrist stable  1100 - Bedrest complete, tolerated PO

## 2025-03-05 NOTE — DISCHARGE INSTRUCTIONS
No driving, operating heavy machinery or alcohol use x 24 hours.      No bending, pushing, pulling or lifting anything over 5lbs x 48 hours.      Dressing is to remain on the right wrist until tomorrow after showering.  If bleeding occurs hold pressure x 20 minutes. If bleeding does not stop continue holding pressure and proceed to the nearest emergency room.     No baths, swimming, or hot tub use x 1 week.    Resume activity as tolerated after 48 hours.      Follow up with cardiologist as recommended.

## 2025-03-05 NOTE — INTERVAL H&P NOTE
Update History & Physical    The patient's History and Physical of February 24, 25 was reviewed with the patient and I examined the patient. There was no change. The surgical site was confirmed by the patient and me.     Plan: The risks, benefits, expected outcome, and alternative to the recommended procedure have been discussed with the patient. Patient understands and wants to proceed with the procedure.     Electronically signed by Eduardo Maciel DO on 3/5/2025 at 8:23 AM

## 2025-03-05 NOTE — PROGRESS NOTES
Sedation Pre- Procedure Evaluation    Patient name: Christina Olivier  YOB: 1980  MRN: [id number]   Allergies:   Dye [iodides], Nsaids, and Red dye #40 (allura red)        Type Of Sedation  [x]local anesthesia  [x]moderate (conscious sedation)  []deep/analgesia      RN Focused History    Yes        No  N/A  []   [x]  Previous problem with airway anesthesia, sedation, or family history of the same    []   [x]  History Of tobacco, alcohol, or substance abuse     [x]   []  Problems associated with stridor, snoring, or sleep apnea    []   [] [x] Pediatric patient, history of premature birth or ventilator support (Moderate Sedation Only)     [x]   [] [] Moderate Sedation: Clear liquids within 6 hours of sedation and NPO within 2 hours    []   [] [x] Deep Sedation:Clear liquids within 6 hours of sedation and NPO within 2 hours      NPO since: 3/4/25 2359       Vitals, Cardiac Rhythm, Pain:   Vitals  Temp: 97.9 °F (36.6 °C)  Temp Source: Oral  Pulse: 55  Heart Rate Source: Monitor  Respirations: 14  BP: 132/84  MAP (Calculated): 100  MAP (mmHg): 100  BP Location: Left upper arm  Patient Position: Semi fowlers  Cardiac Rhythm: Sinus vannessa  Pain Assessment  Pain Assessment: 0-10  Pain Level: 6  Pain Location: Chest  Pain Frequency: Continuous      EKG:  EKG Interpretation: sinus bradycardia.      Kumar Scale: Activity: Able to move four extremities voluntarily or on command  Respiration: Able to breathe and cough freely  Circulation: Blood pressure within 20% of preanesthesia level  Consciousness: Fully awake  Oxygen: SAO2 > 92% on room air   Kumar Score: 10         Prior to Admission medications    Medication Sig Start Date End Date Taking? Authorizing Provider   pantoprazole (PROTONIX) 40 MG tablet TAKE 1 TABLET BY MOUTH EVERY MORNING (BEFORE BREAKFAST) 3/3/25  Yes Christoph Rivera MD   Calcium Citrate-Vitamin D (CALCIUM + VIT D, BARIATRIC ADVANTAGE, CHEWABLE TABLET) Take 2 tablets by mouth 2

## 2025-03-05 NOTE — BRIEF OP NOTE
Section of Cardiology  Adult Brief Cardiac Cath Procedure Note        Procedure(s):  LHC, b/l coronary angio    Pre-operative Diagnosis:  CP    H&P Status: Completed and reviewed.     Post-operative Diagnosis:      Normal coronaries  Normal LVF EF of 65%.     Findings:  See full report    Complications:  none    Primary Proceduralist:   Dr.Wes Maciel DO      Full procedure note to follow

## 2025-03-06 LAB
ECHO BSA: 1.9 M2
EKG ATRIAL RATE: 53 BPM
EKG P AXIS: 49 DEGREES
EKG P-R INTERVAL: 140 MS
EKG Q-T INTERVAL: 456 MS
EKG QRS DURATION: 86 MS
EKG QTC CALCULATION (BAZETT): 427 MS
EKG R AXIS: 8 DEGREES
EKG T AXIS: 15 DEGREES
EKG VENTRICULAR RATE: 53 BPM

## 2025-03-06 PROCEDURE — 93010 ELECTROCARDIOGRAM REPORT: CPT | Performed by: INTERNAL MEDICINE

## 2025-03-11 ENCOUNTER — OFFICE VISIT (OUTPATIENT)
Dept: CARDIOLOGY CLINIC | Age: 45
End: 2025-03-11

## 2025-03-11 VITALS
OXYGEN SATURATION: 99 % | DIASTOLIC BLOOD PRESSURE: 68 MMHG | SYSTOLIC BLOOD PRESSURE: 132 MMHG | BODY MASS INDEX: 27.75 KG/M2 | WEIGHT: 177.2 LBS | HEART RATE: 73 BPM | RESPIRATION RATE: 16 BRPM

## 2025-03-11 DIAGNOSIS — R07.9 CHEST PAIN WITH NORMAL CORONARY ANGIOGRAPHY: Primary | ICD-10-CM

## 2025-03-11 DIAGNOSIS — R42 LIGHTHEADEDNESS: ICD-10-CM

## 2025-03-11 DIAGNOSIS — R42 DIZZINESS: ICD-10-CM

## 2025-03-11 DIAGNOSIS — R55 PRE-SYNCOPE: ICD-10-CM

## 2025-03-11 DIAGNOSIS — R55 NEAR SYNCOPE: ICD-10-CM

## 2025-03-11 NOTE — PROGRESS NOTES
Patient: Christina Olivier  YOB: 1980  MRN: [id number]    Chief Complaint:   Chief Complaint   Patient presents with    Follow Up After Procedure     CARDIAC CATH 3/5/25  interpretor [id number]    Chest Pain     Since procedure-radiating to arm         Subjective/HPI   8/19/2022: Patient seen for initial medical evaluation.  Patient with past medical history hypothyroidism, obesity, NANDO, PTSD.  Patient seen today per her request for bradycardia.  States she had an EKG at PCPs office that showed heart rate in the 40s.  EKG in office today shows normal sinus rhythm, heart rate in the 60s.  Patient also with complaints of intermittent, midsternal chest pain/pressure that radiates to left arm.  States that sometimes pain feels like a burning or squeezing.  Reports shortness of breath with exertion.  Denies any other associated symptoms.  Denies aggravating or alleviating factors.  Patient also reports intermittent palpitations and feeling like her heart is skipping beats.    Pt denies  change in exercise capacity, fatigue,  nausea, vomiting, diarrhea, constipation, motor weakness, insomnia, weight loss, syncope, dizziness, lightheadedness, PND, orthopnea, or claudication. No bleeding issues. Pt denies CHF type symptoms.  No recent hospitalizations.  Pt is compliant with all Rx medications.  Blood pressure and heart rate are under control.      8/30/2022: pt here for follow up visit.  Was in the ED on 8/28 with chest pain. States she was sitting in a movie when pain started. Work up at that time was unremarkable, troponins negative x 2, CP was reproducible.   Pt was given SL Nitro x 2 with relief of pain.  Denies any further episodes of chest pain. Pt states her BP has been running high lately.  States she feels her HR going fast sometimes.  Pt is scheduled for stress, echo and event monitor in the next few weeks      10-20-22: Status post echocardiogram in October 12, 2022 with normal LV function ejection

## 2025-03-20 RX ORDER — PANTOPRAZOLE SODIUM 40 MG/1
40 TABLET, DELAYED RELEASE ORAL
Qty: 90 TABLET | Refills: 3 | Status: SHIPPED | OUTPATIENT
Start: 2025-03-20

## 2025-03-25 DIAGNOSIS — R55 PRE-SYNCOPE: ICD-10-CM

## 2025-04-01 ENCOUNTER — OFFICE VISIT (OUTPATIENT)
Dept: CARDIOLOGY CLINIC | Age: 45
End: 2025-04-01
Payer: COMMERCIAL

## 2025-04-01 VITALS
OXYGEN SATURATION: 93 % | DIASTOLIC BLOOD PRESSURE: 62 MMHG | BODY MASS INDEX: 26.78 KG/M2 | HEART RATE: 42 BPM | SYSTOLIC BLOOD PRESSURE: 112 MMHG | WEIGHT: 171 LBS

## 2025-04-01 DIAGNOSIS — Z71.2 ENCOUNTER TO DISCUSS TEST RESULTS: Primary | ICD-10-CM

## 2025-04-01 PROCEDURE — G8419 CALC BMI OUT NRM PARAM NOF/U: HCPCS

## 2025-04-01 PROCEDURE — 99213 OFFICE O/P EST LOW 20 MIN: CPT

## 2025-04-01 PROCEDURE — G8427 DOCREV CUR MEDS BY ELIG CLIN: HCPCS

## 2025-04-01 PROCEDURE — 1036F TOBACCO NON-USER: CPT

## 2025-04-01 NOTE — PROGRESS NOTES
2020    HYSTERECTOMY (CERVIX STATUS UNKNOWN)      Partial-still has 1 ovary    JOINT REPLACEMENT Left     LAPAROSCOPY N/A 11/11/2024    ROBOTIC ABDOMINAL EXPLORATION WITH CLOSURE OF INTERNAL HERNIA performed by Christoph Rivera MD at [facility] [place]    OVARY REMOVAL      only removed 1 ovary       Social History     Socioeconomic History    Marital status: Single   Tobacco Use    Smoking status: Never    Smokeless tobacco: Never   Vaping Use    Vaping status: Never Used   Substance and Sexual Activity    Drug use: Never     Social Drivers of Health     Food Insecurity: Patient Unable To Answer (11/11/2024)    Hunger Vital Sign     Worried About Running Out of Food in the Last Year: Patient unable to answer     Ran Out of Food in the Last Year: Patient unable to answer   Transportation Needs: Patient Unable To Answer (11/11/2024)    PRAPARE - Transportation     Lack of Transportation (Medical): Patient unable to answer     Lack of Transportation (Non-Medical): Patient unable to answer   Housing Stability: Patient Unable To Answer (11/11/2024)    Housing Stability Vital Sign     Unable to Pay for Housing in the Last Year: Patient unable to answer     Number of Times Moved in the Last Year: 1     Homeless in the Last Year: Patient unable to answer       Family History   Problem Relation Age of Onset    Breast Cancer Sister         doesn't know age or laterality         Review of Systems:   Review of Systems      Physical Examination:    /62   Pulse (!) 42   Wt 77.6 kg (171 lb)   SpO2 93%   BMI 26.78 kg/m²    Physical Exam    LABS:  CBC:   Lab Results   Component Value Date/Time    WBC 3.8 03/05/2025 07:55 AM    RBC 3.47 03/05/2025 07:55 AM    HGB 11.1 03/05/2025 07:55 AM    HCT 32.8 03/05/2025 07:55 AM    MCV 94.5 03/05/2025 07:55 AM    MCH 32.0 03/05/2025 07:55 AM    MCHC 33.8 03/05/2025 07:55 AM    RDW 12.2 03/05/2025 07:55 AM     03/05/2025 07:55 AM    MPV 9.5 03/27/2013 03:12 PM     Lipids:  Lab

## 2025-04-19 DIAGNOSIS — R07.9 CHEST PAIN, UNSPECIFIED TYPE: ICD-10-CM

## 2025-04-20 RX ORDER — NITROGLYCERIN 0.4 MG/1
0.4 TABLET SUBLINGUAL EVERY 5 MIN PRN
Qty: 25 TABLET | Refills: 3 | Status: SHIPPED | OUTPATIENT
Start: 2025-04-20

## 2025-05-28 RX ORDER — RANOLAZINE 500 MG/1
500 TABLET, EXTENDED RELEASE ORAL 2 TIMES DAILY
Qty: 60 TABLET | Refills: 3 | OUTPATIENT
Start: 2025-05-28

## 2025-05-28 NOTE — TELEPHONE ENCOUNTER
Per LOV note 04/01/2025, No Ranexa or Imdur due to headaches. No refills given. Ranexa removed from patient's medication list.     Requesting medication refill. Please approve or deny this request.    Rx requested:  Requested Prescriptions     Pending Prescriptions Disp Refills    ranolazine (RANEXA) 500 MG extended release tablet [Pharmacy Med Name: RANOLAZINE  MG TB12 500 Tablet] 60 tablet 3     Sig: TAKE 1 TABLET BY MOUTH 2 TIMES DAILY         Last Office Visit:   2/11/2025      Next Visit Date:  Future Appointments   Date Time Provider Department Center   5/30/2025 11:15 AM Koffi Perdue MD [facility] -   6/17/2025  8:45 AM Eduardo Maciel DO [facility]   11/21/2025  1:00 PM Christoph Rivera MD [facility]

## 2025-05-30 ENCOUNTER — OFFICE VISIT (OUTPATIENT)
Age: 45
End: 2025-05-30
Payer: COMMERCIAL

## 2025-05-30 VITALS
SYSTOLIC BLOOD PRESSURE: 114 MMHG | HEART RATE: 60 BPM | WEIGHT: 181 LBS | DIASTOLIC BLOOD PRESSURE: 76 MMHG | BODY MASS INDEX: 28.35 KG/M2

## 2025-05-30 DIAGNOSIS — F45.8 ANXIETY HYPERVENTILATION: Primary | ICD-10-CM

## 2025-05-30 DIAGNOSIS — R42 LIGHTHEADED: ICD-10-CM

## 2025-05-30 DIAGNOSIS — R42 DIZZINESS: ICD-10-CM

## 2025-05-30 DIAGNOSIS — F41.9 ANXIETY HYPERVENTILATION: Primary | ICD-10-CM

## 2025-05-30 PROCEDURE — 99203 OFFICE O/P NEW LOW 30 MIN: CPT | Performed by: PSYCHIATRY & NEUROLOGY

## 2025-05-30 PROCEDURE — G8419 CALC BMI OUT NRM PARAM NOF/U: HCPCS | Performed by: PSYCHIATRY & NEUROLOGY

## 2025-05-30 PROCEDURE — G8427 DOCREV CUR MEDS BY ELIG CLIN: HCPCS | Performed by: PSYCHIATRY & NEUROLOGY

## 2025-05-30 PROCEDURE — 1036F TOBACCO NON-USER: CPT | Performed by: PSYCHIATRY & NEUROLOGY

## 2025-05-30 PROCEDURE — 99204 OFFICE O/P NEW MOD 45 MIN: CPT | Performed by: PSYCHIATRY & NEUROLOGY

## 2025-05-30 RX ORDER — CALCIUM CARBONATE/VITAMIN D3 600 MG-10
TABLET ORAL
COMMUNITY
Start: 2025-05-06 | End: 2025-05-30

## 2025-05-30 RX ORDER — RANOLAZINE 500 MG/1
TABLET, EXTENDED RELEASE ORAL
COMMUNITY
Start: 2025-05-28 | End: 2025-05-30

## 2025-05-30 RX ORDER — BUSPIRONE HYDROCHLORIDE 10 MG/1
TABLET ORAL
Qty: 60 TABLET | Refills: 2 | Status: SHIPPED | OUTPATIENT
Start: 2025-05-30 | End: 2025-05-30

## 2025-05-30 NOTE — PROGRESS NOTES
Subjective:      Patient ID: Christina Olivier is a 44 y.o. female who presents today for:  Chief Complaint   Patient presents with    Establish Care     Pt states her cardiologist referred her for occasional dizziness. Pt states she feels as if she is going to pass out and her hands, and back of her neck go numb.  Pt states all her test's from cardiology came back normal. Pt states she had labs done, and EKG, stress test, and heart monitor. Pt states the she has SOB all the time, even with normal daily activities.        HPI 44-year-old had female with a history of near syncope.  Patient has occasional dizziness and feels that she is going to pass out and her hands and back go numb cardiology relations are normal.  Patient reports she is short of breath all the time even with normal activities.    Translation obtained through the Internet.  Patient symptoms appear to be ongoing.  She does not report true vertigo.  Denies any changes hearing loss.  She has not any falls or loss of consciousness she does have palpitations.  She overtly does not report anxiety.    Orthostatic blood pressure recordings obtained supine 124/1978 with a pulse of 64 and standing blood pressure 114/76 with a pulse of 60 patient is not symptomatic    Entire evaluation done through translation and therefore the time taken    Past Medical History:   Diagnosis Date    Allergic rhinitis     Depressive disorder     Dyspnea on exertion 05/25/2023    History of bariatric surgery 05/25/2023    Hypertension     Hypothyroidism     Inverted nipple     bilateral - normal for her    Migraine     Obesity     NANDO (obstructive sleep apnea)     Osteoarthritis     Precordial pain 10/20/2022    PTSD (post-traumatic stress disorder)      Past Surgical History:   Procedure Laterality Date    CARDIAC PROCEDURE N/A 3/5/2025    Left heart cath / coronary angiography [place] Eduardo Maciel [place] [facility] [place] [facility]    GASTRIC BYPASS SURGERY  2020

## 2025-05-30 NOTE — CONSULTS
Session ID: [id number]  Session Duration: Longer than 54 minutes  Language: Greenlandic   ID: #[id number]   Name: Diaz

## 2025-06-17 ENCOUNTER — OFFICE VISIT (OUTPATIENT)
Dept: CARDIOLOGY CLINIC | Age: 45
End: 2025-06-17
Payer: COMMERCIAL

## 2025-06-17 VITALS
OXYGEN SATURATION: 98 % | RESPIRATION RATE: 16 BRPM | WEIGHT: 177.8 LBS | BODY MASS INDEX: 27.85 KG/M2 | HEART RATE: 75 BPM

## 2025-06-17 DIAGNOSIS — I10 PRIMARY HYPERTENSION: Primary | ICD-10-CM

## 2025-06-17 DIAGNOSIS — R42 DIZZINESS: ICD-10-CM

## 2025-06-17 DIAGNOSIS — R00.2 PALPITATIONS: ICD-10-CM

## 2025-06-17 DIAGNOSIS — Z71.2 ENCOUNTER TO DISCUSS TEST RESULTS: ICD-10-CM

## 2025-06-17 DIAGNOSIS — R06.02 SHORTNESS OF BREATH: ICD-10-CM

## 2025-06-17 DIAGNOSIS — R55 NEAR SYNCOPE: ICD-10-CM

## 2025-06-17 DIAGNOSIS — R07.9 CHEST PAIN WITH NORMAL CORONARY ANGIOGRAPHY: ICD-10-CM

## 2025-06-17 DIAGNOSIS — R42 LIGHTHEADEDNESS: ICD-10-CM

## 2025-06-17 PROCEDURE — 99214 OFFICE O/P EST MOD 30 MIN: CPT | Performed by: INTERNAL MEDICINE

## 2025-06-17 PROCEDURE — 1036F TOBACCO NON-USER: CPT | Performed by: INTERNAL MEDICINE

## 2025-06-17 PROCEDURE — G8428 CUR MEDS NOT DOCUMENT: HCPCS | Performed by: INTERNAL MEDICINE

## 2025-06-17 PROCEDURE — 93000 ELECTROCARDIOGRAM COMPLETE: CPT | Performed by: INTERNAL MEDICINE

## 2025-06-17 PROCEDURE — G8419 CALC BMI OUT NRM PARAM NOF/U: HCPCS | Performed by: INTERNAL MEDICINE

## 2025-06-17 NOTE — PROGRESS NOTES
Patient: Christina Olivier  YOB: 1980  MRN: [id number]    Chief Complaint:   Chief Complaint   Patient presents with    Follow-up     Cardiac clearance for  abdominoplasty         Subjective/HPI   8/19/2022: Patient seen for initial medical evaluation.  Patient with past medical history hypothyroidism, obesity, NANDO, PTSD.  Patient seen today per her request for bradycardia.  States she had an EKG at PCPs office that showed heart rate in the 40s.  EKG in office today shows normal sinus rhythm, heart rate in the 60s.  Patient also with complaints of intermittent, midsternal chest pain/pressure that radiates to left arm.  States that sometimes pain feels like a burning or squeezing.  Reports shortness of breath with exertion.  Denies any other associated symptoms.  Denies aggravating or alleviating factors.  Patient also reports intermittent palpitations and feeling like her heart is skipping beats.    Pt denies  change in exercise capacity, fatigue,  nausea, vomiting, diarrhea, constipation, motor weakness, insomnia, weight loss, syncope, dizziness, lightheadedness, PND, orthopnea, or claudication. No bleeding issues. Pt denies CHF type symptoms.  No recent hospitalizations.  Pt is compliant with all Rx medications.  Blood pressure and heart rate are under control.      8/30/2022: pt here for follow up visit.  Was in the ED on 8/28 with chest pain. States she was sitting in a movie when pain started. Work up at that time was unremarkable, troponins negative x 2, CP was reproducible.   Pt was given SL Nitro x 2 with relief of pain.  Denies any further episodes of chest pain. Pt states her BP has been running high lately.  States she feels her HR going fast sometimes.  Pt is scheduled for stress, echo and event monitor in the next few weeks      10-20-22: Status post echocardiogram in October 12, 2022 with normal LV function ejection fraction of 55% no valve abnormality.  Status post nuclear stress test

## 2025-06-20 ENCOUNTER — HOSPITAL ENCOUNTER (OUTPATIENT)
Dept: NEUROLOGY | Age: 45
Discharge: HOME | End: 2025-06-20
Attending: PSYCHIATRY & NEUROLOGY
Payer: COMMERCIAL

## 2025-06-20 ENCOUNTER — HOSPITAL ENCOUNTER (OUTPATIENT)
Dept: ULTRASOUND IMAGING | Age: 45
Discharge: HOME | End: 2025-06-22
Attending: PSYCHIATRY & NEUROLOGY
Payer: COMMERCIAL

## 2025-06-20 DIAGNOSIS — R42 DIZZINESS: ICD-10-CM

## 2025-06-20 PROCEDURE — 95816 EEG AWAKE AND DROWSY: CPT

## 2025-06-20 PROCEDURE — 93880 EXTRACRANIAL BILAT STUDY: CPT

## 2025-06-25 ENCOUNTER — TELEPHONE (OUTPATIENT)
Age: 45
End: 2025-06-25

## 2025-06-25 NOTE — TELEPHONE ENCOUNTER
Pt has an EEG done on 6/22/25 and is requesting the results. Pt is nervous and was told they would be released in 2 days. Pt will be going out of town this weekend and would like them beofre then. Please advise.

## 2025-07-05 ENCOUNTER — TRANSCRIBE ORDERS (OUTPATIENT)
Dept: WOMENS IMAGING | Age: 45
End: 2025-07-05

## 2025-07-05 DIAGNOSIS — Z12.31 SCREENING MAMMOGRAM FOR BREAST CANCER: Primary | ICD-10-CM

## 2025-07-07 ENCOUNTER — HOSPITAL ENCOUNTER (OUTPATIENT)
Dept: WOMENS IMAGING | Age: 45
Discharge: HOME | End: 2025-07-09
Attending: RADIOLOGY
Payer: COMMERCIAL

## 2025-07-07 ENCOUNTER — HOSPITAL ENCOUNTER (OUTPATIENT)
Dept: ULTRASOUND IMAGING | Age: 45
Discharge: HOME | End: 2025-07-09
Attending: RADIOLOGY
Payer: COMMERCIAL

## 2025-07-07 DIAGNOSIS — N63.0 BREAST LUMP IN FEMALE: ICD-10-CM

## 2025-07-07 DIAGNOSIS — Z12.31 SCREENING MAMMOGRAM FOR BREAST CANCER: ICD-10-CM

## 2025-07-07 DIAGNOSIS — N63.0 LUMP IN FEMALE BREAST: ICD-10-CM

## 2025-07-07 PROCEDURE — 76642 ULTRASOUND BREAST LIMITED: CPT

## 2025-07-07 PROCEDURE — G0279 TOMOSYNTHESIS, MAMMO: HCPCS

## 2025-07-07 NOTE — CONSULTS
Session ID: [id number]  Session Duration: 19 minutes  Language: North Korean   ID: #[id number]   Name: Emilia

## 2025-07-28 RX ORDER — BUSPIRONE HYDROCHLORIDE 10 MG/1
TABLET ORAL
Qty: 60 TABLET | Refills: 2 | Status: SHIPPED | OUTPATIENT
Start: 2025-07-28

## 2025-07-28 NOTE — TELEPHONE ENCOUNTER
Requesting medication refill. Please approve or deny this request.    Rx requested:  Requested Prescriptions     Pending Prescriptions Disp Refills    busPIRone (BUSPAR) 10 MG tablet [Pharmacy Med Name: BUSPIRONE HCL 10 MG TABS 10 Tablet] 60 tablet 2     Si TO BE TAKEN TWICE A DAY         Last Office Visit:   2025      Next Visit Date:  Future Appointments   Date Time Provider Department Center   9/3/2025  2:30 PM Koffi Perdue MD [facility] -   2025  1:00 PM Christoph Rivera MD [facility]   2026 12:00 PM Tami Gamino, APRN - CNP [facility]               Last refill 25. Please approve or deny.

## 2025-09-02 RX ORDER — PANTOPRAZOLE SODIUM 40 MG/1
40 TABLET, DELAYED RELEASE ORAL
Qty: 90 TABLET | Refills: 3 | Status: SHIPPED | OUTPATIENT
Start: 2025-09-02

## (undated) DEVICE — KIT ANGIO W/ AT P65 PREM HND CTRL FOR CNTRST DEL ANGIOTOUCH

## (undated) DEVICE — TIP COVER ACCESSORY

## (undated) DEVICE — 3M™ TEGADERM™ TRANSPARENT FILM DRESSING FRAME STYLE, 1626W, 4 IN X 4-3/4 IN (10 CM X 12 CM), 50/CT 4CT/CASE: Brand: 3M™ TEGADERM™

## (undated) DEVICE — TUBING, SUCTION, 9/32" X 12', STRAIGHT: Brand: MEDLINE INDUSTRIES, INC.

## (undated) DEVICE — KIT MED IMAG CNTRST AGNT W/ IOPAMIDOL REUSE

## (undated) DEVICE — GLOVE SURG SZ 6 THK91MIL LTX FREE SYN POLYISOPRENE ANTI

## (undated) DEVICE — DRAPE,UTILITY,TAPE,15X26,STERILE: Brand: MEDLINE

## (undated) DEVICE — GLOVE ORANGE PI 7 1/2   MSG9075

## (undated) DEVICE — SOLUTION ANTIFOG VIS SYS CLEARIFY LAPSCP

## (undated) DEVICE — GENERAL LAPAROSCOPY: Brand: MEDLINE INDUSTRIES, INC.

## (undated) DEVICE — HYPODERMIC SAFETY NEEDLE: Brand: MAGELLAN

## (undated) DEVICE — SUTURE MONOCRYL SZ 4-0 L27IN ABSRB UD L19MM PS-2 1/2 CIR PRIM Y426H

## (undated) DEVICE — CATHETER COR DIAG AMPLATZ R 2.0 CRV 5FR 100CM 0 SIDE H

## (undated) DEVICE — DRAPE SURG BRACH STRL

## (undated) DEVICE — CATHETER COR DIAG PIGTAILS PIG 145 CRV 5FR 110CM 6 SIDE H

## (undated) DEVICE — ELECTRODE TRAIN EDGE SYS W/ QUIK-COMBO CONN

## (undated) DEVICE — SHEET,DRAPE,53X77,STERILE: Brand: MEDLINE

## (undated) DEVICE — COLUMN DRAPE

## (undated) DEVICE — Device

## (undated) DEVICE — DRESSING QUIKCLOT RADIAL 0.8X1.5 IN W/ADHESIVE

## (undated) DEVICE — SYRINGE MED 30ML STD CLR PLAS LUERLOCK TIP N CTRL DISP

## (undated) DEVICE — GOWN,SIRUS,NONRNF,SETINSLV,XL,20/CS: Brand: MEDLINE

## (undated) DEVICE — LABEL MED MINI W/ MARKER

## (undated) DEVICE — NEEDLE INSUF L120MM ULT VERES ENDOPATH

## (undated) DEVICE — BLADELESS OBTURATOR: Brand: WECK VISTA

## (undated) DEVICE — GLOVE ORANGE PI 8 1/2   MSG9085

## (undated) DEVICE — GUIDEWIRE VASC L260CM DIA0.035IN TIP L3MM STD EXCHG PTFE J

## (undated) DEVICE — COUNTER NDL 40 COUNT HLD 70 FOAM BLK ADH W/ MAG

## (undated) DEVICE — ARM DRAPE

## (undated) DEVICE — ADHESIVE SKIN CLOSURE WND 8.661X1.5 IN 22 CM LIQUIBAND SECUR

## (undated) DEVICE — CONTAINER,SPECIMEN,OR STERILE,4OZ: Brand: MEDLINE

## (undated) DEVICE — COVER LT HNDL BLU PLAS

## (undated) DEVICE — SEAL

## (undated) DEVICE — KIT MFLD ISOLATN NACL CNTRST PRT TBNG SPIK W/ PRSS TRNSDUC

## (undated) DEVICE — GLIDESHEATH SLENDER STAINLESS STEEL KIT: Brand: GLIDESHEATH SLENDER